# Patient Record
Sex: MALE | Race: WHITE | Employment: OTHER | URBAN - METROPOLITAN AREA
[De-identification: names, ages, dates, MRNs, and addresses within clinical notes are randomized per-mention and may not be internally consistent; named-entity substitution may affect disease eponyms.]

---

## 2017-03-21 ENCOUNTER — HOSPITAL ENCOUNTER (INPATIENT)
Age: 72
LOS: 14 days | Discharge: NURSING, REHAB OR LONG-TERM CARE | DRG: 271 | End: 2017-04-04
Attending: EMERGENCY MEDICINE | Admitting: FAMILY MEDICINE
Payer: MEDICARE

## 2017-03-21 DIAGNOSIS — M54.9 ACUTE BILATERAL BACK PAIN, UNSPECIFIED BACK LOCATION: ICD-10-CM

## 2017-03-21 DIAGNOSIS — R53.81 PHYSICAL DEBILITY: ICD-10-CM

## 2017-03-21 DIAGNOSIS — I82.4Y3 DVT, LOWER EXTREMITY, PROXIMAL, ACUTE, BILATERAL (HCC): Primary | ICD-10-CM

## 2017-03-21 DIAGNOSIS — T14.8XXA HEMATOMA: ICD-10-CM

## 2017-03-21 DIAGNOSIS — F33.9 EPISODE OF RECURRENT MAJOR DEPRESSIVE DISORDER, UNSPECIFIED DEPRESSION EPISODE SEVERITY (HCC): ICD-10-CM

## 2017-03-21 PROBLEM — I82.4Y9 DVT, LOWER EXTREMITY, PROXIMAL, ACUTE (HCC): Status: ACTIVE | Noted: 2017-03-21

## 2017-03-21 LAB
ALBUMIN SERPL BCP-MCNC: 2.6 G/DL (ref 3.2–4.6)
ALBUMIN/GLOB SERPL: 0.7 {RATIO} (ref 1.2–3.5)
ALP SERPL-CCNC: 103 U/L (ref 50–136)
ALT SERPL-CCNC: 26 U/L (ref 12–65)
ANION GAP BLD CALC-SCNC: 7 MMOL/L (ref 7–16)
AST SERPL W P-5'-P-CCNC: 45 U/L (ref 15–37)
BILIRUB SERPL-MCNC: 0.4 MG/DL (ref 0.2–1.1)
BUN SERPL-MCNC: 12 MG/DL (ref 8–23)
CALCIUM SERPL-MCNC: 8.6 MG/DL (ref 8.3–10.4)
CHLORIDE SERPL-SCNC: 110 MMOL/L (ref 98–107)
CO2 SERPL-SCNC: 28 MMOL/L (ref 21–32)
CREAT SERPL-MCNC: 1.33 MG/DL (ref 0.8–1.5)
GLOBULIN SER CALC-MCNC: 3.9 G/DL (ref 2.3–3.5)
GLUCOSE SERPL-MCNC: 109 MG/DL (ref 65–100)
POTASSIUM SERPL-SCNC: 4.5 MMOL/L (ref 3.5–5.1)
PROT SERPL-MCNC: 6.5 G/DL (ref 6.3–8.2)
SODIUM SERPL-SCNC: 145 MMOL/L (ref 136–145)

## 2017-03-21 PROCEDURE — 99284 EMERGENCY DEPT VISIT MOD MDM: CPT | Performed by: EMERGENCY MEDICINE

## 2017-03-21 PROCEDURE — 74011250636 HC RX REV CODE- 250/636: Performed by: FAMILY MEDICINE

## 2017-03-21 PROCEDURE — 80053 COMPREHEN METABOLIC PANEL: CPT | Performed by: EMERGENCY MEDICINE

## 2017-03-21 PROCEDURE — 65270000029 HC RM PRIVATE

## 2017-03-21 PROCEDURE — 77030027138 HC INCENT SPIROMETER -A

## 2017-03-21 RX ORDER — HEPARIN SODIUM 5000 [USP'U]/100ML
18-36 INJECTION, SOLUTION INTRAVENOUS
Status: DISCONTINUED | OUTPATIENT
Start: 2017-03-21 | End: 2017-03-22 | Stop reason: SDUPTHER

## 2017-03-21 RX ORDER — SODIUM CHLORIDE 0.9 % (FLUSH) 0.9 %
5-10 SYRINGE (ML) INJECTION EVERY 8 HOURS
Status: DISCONTINUED | OUTPATIENT
Start: 2017-03-22 | End: 2017-04-04 | Stop reason: HOSPADM

## 2017-03-21 RX ORDER — SODIUM CHLORIDE 0.9 % (FLUSH) 0.9 %
5-10 SYRINGE (ML) INJECTION AS NEEDED
Status: DISCONTINUED | OUTPATIENT
Start: 2017-03-21 | End: 2017-04-04 | Stop reason: HOSPADM

## 2017-03-21 RX ORDER — SAME BUTANEDISULFONATE/BETAINE 400-600 MG
250 POWDER IN PACKET (EA) ORAL 2 TIMES DAILY
Status: ON HOLD | COMMUNITY
End: 2017-05-02

## 2017-03-21 RX ORDER — ENOXAPARIN SODIUM 150 MG/ML
1 INJECTION SUBCUTANEOUS EVERY 12 HOURS
Status: DISCONTINUED | OUTPATIENT
Start: 2017-03-21 | End: 2017-03-21

## 2017-03-21 RX ORDER — HYDROCODONE BITARTRATE AND ACETAMINOPHEN 5; 325 MG/1; MG/1
2 TABLET ORAL
Status: ON HOLD | COMMUNITY
End: 2017-04-04

## 2017-03-21 RX ORDER — HEPARIN SODIUM 5000 [USP'U]/ML
60 INJECTION, SOLUTION INTRAVENOUS; SUBCUTANEOUS ONCE
Status: COMPLETED | OUTPATIENT
Start: 2017-03-21 | End: 2017-03-21

## 2017-03-21 RX ORDER — FAMOTIDINE 20 MG/1
20 TABLET, FILM COATED ORAL DAILY
COMMUNITY
End: 2017-05-22

## 2017-03-21 RX ORDER — POLYETHYLENE GLYCOL 3350 17 G/17G
17 POWDER, FOR SOLUTION ORAL DAILY
Status: ON HOLD | COMMUNITY
End: 2017-05-02

## 2017-03-21 RX ORDER — CYCLOBENZAPRINE HCL 5 MG
5 TABLET ORAL
Status: ON HOLD | COMMUNITY
End: 2017-05-02

## 2017-03-21 RX ORDER — HEPARIN SODIUM 5000 [USP'U]/100ML
18-36 INJECTION, SOLUTION INTRAVENOUS
Status: DISCONTINUED | OUTPATIENT
Start: 2017-03-21 | End: 2017-03-21 | Stop reason: DRUGHIGH

## 2017-03-21 RX ORDER — INSULIN LISPRO 100 [IU]/ML
INJECTION, SOLUTION INTRAVENOUS; SUBCUTANEOUS
Status: DISCONTINUED | OUTPATIENT
Start: 2017-03-22 | End: 2017-04-04 | Stop reason: HOSPADM

## 2017-03-21 RX ORDER — TAMSULOSIN HYDROCHLORIDE 0.4 MG/1
0.4 CAPSULE ORAL DAILY
Status: ON HOLD | COMMUNITY
End: 2017-05-02

## 2017-03-21 RX ADMIN — HEPARIN SODIUM 7250 UNITS: 5000 INJECTION, SOLUTION INTRAVENOUS; SUBCUTANEOUS at 23:33

## 2017-03-21 RX ADMIN — HEPARIN SODIUM AND DEXTROSE 18 UNITS/KG/HR: 5000; 5 INJECTION INTRAVENOUS at 23:45

## 2017-03-21 RX ADMIN — Medication 10 ML: at 23:36

## 2017-03-21 NOTE — ED NOTES
\"A month a go I was climbing 2 steps to take down some curtains and I fell and I hit my head. I went to [address] but while there they found out my kidneys were failing. My heart went crazy too. I was there a couple of weeks. They sent me 2 rehab . I noticed my legs were swelling and my back back hurt worse. They said I had 3 hair line fractures in my back. I went to the doctor this afternoon and they saw all the swelling and sent me to get an ultrasound and they found 2 big blood clots and other blood clots in my legs.  Then they sent us here\"

## 2017-03-22 ENCOUNTER — APPOINTMENT (OUTPATIENT)
Dept: INTERVENTIONAL RADIOLOGY/VASCULAR | Age: 72
DRG: 271 | End: 2017-03-22
Payer: MEDICARE

## 2017-03-22 LAB
APTT PPP: 26.2 SEC (ref 23.5–31.7)
APTT PPP: 51.6 SEC (ref 23.5–31.7)
BACTERIA SPEC CULT: NORMAL
ERYTHROCYTE [DISTWIDTH] IN BLOOD BY AUTOMATED COUNT: 17.5 % (ref 11.9–14.6)
FIBRINOGEN PPP-MCNC: 121 MG/DL (ref 172–437)
GLUCOSE BLD STRIP.AUTO-MCNC: 102 MG/DL (ref 65–100)
GLUCOSE BLD STRIP.AUTO-MCNC: 113 MG/DL (ref 65–100)
GLUCOSE BLD STRIP.AUTO-MCNC: 84 MG/DL (ref 65–100)
GLUCOSE BLD STRIP.AUTO-MCNC: 91 MG/DL (ref 65–100)
GLUCOSE BLD STRIP.AUTO-MCNC: 93 MG/DL (ref 65–100)
HCT VFR BLD AUTO: 32.7 % (ref 41.1–50.3)
HGB BLD-MCNC: 10.4 G/DL (ref 13.6–17.2)
INR PPP: 1.2 (ref 0.9–1.2)
MCH RBC QN AUTO: 30 PG (ref 26.1–32.9)
MCHC RBC AUTO-ENTMCNC: 31.8 G/DL (ref 31.4–35)
MCV RBC AUTO: 94.2 FL (ref 79.6–97.8)
PLATELET # BLD AUTO: 165 K/UL (ref 150–450)
PMV BLD AUTO: 9.2 FL (ref 10.8–14.1)
PROTHROMBIN TIME: 13.2 SEC (ref 9.6–12)
RBC # BLD AUTO: 3.47 M/UL (ref 4.23–5.67)
SERVICE CMNT-IMP: NORMAL
WBC # BLD AUTO: 6.7 K/UL (ref 4.3–11.1)

## 2017-03-22 PROCEDURE — 36556 INSERT NON-TUNNEL CV CATH: CPT

## 2017-03-22 PROCEDURE — 74011000250 HC RX REV CODE- 250: Performed by: RADIOLOGY

## 2017-03-22 PROCEDURE — 85384 FIBRINOGEN ACTIVITY: CPT | Performed by: RADIOLOGY

## 2017-03-22 PROCEDURE — C1894 INTRO/SHEATH, NON-LASER: HCPCS

## 2017-03-22 PROCEDURE — 65610000001 HC ROOM ICU GENERAL

## 2017-03-22 PROCEDURE — 74011250636 HC RX REV CODE- 250/636: Performed by: RADIOLOGY

## 2017-03-22 PROCEDURE — 77030019605

## 2017-03-22 PROCEDURE — 0T9B70Z DRAINAGE OF BLADDER WITH DRAINAGE DEVICE, VIA NATURAL OR ARTIFICIAL OPENING: ICD-10-PCS | Performed by: RADIOLOGY

## 2017-03-22 PROCEDURE — 3E03317 INTRODUCTION OF OTHER THROMBOLYTIC INTO PERIPHERAL VEIN, PERCUTANEOUS APPROACH: ICD-10-PCS | Performed by: RADIOLOGY

## 2017-03-22 PROCEDURE — C1751 CATH, INF, PER/CENT/MIDLINE: HCPCS

## 2017-03-22 PROCEDURE — 85027 COMPLETE CBC AUTOMATED: CPT | Performed by: RADIOLOGY

## 2017-03-22 PROCEDURE — 74011250636 HC RX REV CODE- 250/636: Performed by: FAMILY MEDICINE

## 2017-03-22 PROCEDURE — 99153 MOD SED SAME PHYS/QHP EA: CPT

## 2017-03-22 PROCEDURE — 77030002996 HC SUT SLK J&J -A

## 2017-03-22 PROCEDURE — 75825 VEIN X-RAY TRUNK: CPT

## 2017-03-22 PROCEDURE — 85610 PROTHROMBIN TIME: CPT | Performed by: PHYSICIAN ASSISTANT

## 2017-03-22 PROCEDURE — 77030021532 HC CATH ANGI DX IMPRS MRTM -B

## 2017-03-22 PROCEDURE — 87641 MR-STAPH DNA AMP PROBE: CPT | Performed by: INTERNAL MEDICINE

## 2017-03-22 PROCEDURE — 87641 MR-STAPH DNA AMP PROBE: CPT | Performed by: FAMILY MEDICINE

## 2017-03-22 PROCEDURE — 74011250637 HC RX REV CODE- 250/637: Performed by: RADIOLOGY

## 2017-03-22 PROCEDURE — C1769 GUIDE WIRE: HCPCS

## 2017-03-22 PROCEDURE — B548ZZA ULTRASONOGRAPHY OF SUPERIOR VENA CAVA, GUIDANCE: ICD-10-PCS | Performed by: RADIOLOGY

## 2017-03-22 PROCEDURE — 82962 GLUCOSE BLOOD TEST: CPT

## 2017-03-22 PROCEDURE — 74011250637 HC RX REV CODE- 250/637: Performed by: INTERNAL MEDICINE

## 2017-03-22 PROCEDURE — 85730 THROMBOPLASTIN TIME PARTIAL: CPT | Performed by: PHYSICIAN ASSISTANT

## 2017-03-22 PROCEDURE — B54DZZA ULTRASONOGRAPHY OF BILATERAL LOWER EXTREMITY VEINS, GUIDANCE: ICD-10-PCS | Performed by: RADIOLOGY

## 2017-03-22 PROCEDURE — 37212 THROMBOLYTIC VENOUS THERAPY: CPT

## 2017-03-22 PROCEDURE — C1887 CATHETER, GUIDING: HCPCS

## 2017-03-22 PROCEDURE — 77030034849

## 2017-03-22 PROCEDURE — 06HY33Z INSERTION OF INFUSION DEVICE INTO LOWER VEIN, PERCUTANEOUS APPROACH: ICD-10-PCS | Performed by: RADIOLOGY

## 2017-03-22 PROCEDURE — 85730 THROMBOPLASTIN TIME PARTIAL: CPT | Performed by: FAMILY MEDICINE

## 2017-03-22 PROCEDURE — 02HV33Z INSERTION OF INFUSION DEVICE INTO SUPERIOR VENA CAVA, PERCUTANEOUS APPROACH: ICD-10-PCS | Performed by: RADIOLOGY

## 2017-03-22 PROCEDURE — 74011250636 HC RX REV CODE- 250/636

## 2017-03-22 PROCEDURE — 99152 MOD SED SAME PHYS/QHP 5/>YRS: CPT

## 2017-03-22 PROCEDURE — 36415 COLL VENOUS BLD VENIPUNCTURE: CPT | Performed by: FAMILY MEDICINE

## 2017-03-22 PROCEDURE — 74011636320 HC RX REV CODE- 636/320: Performed by: RADIOLOGY

## 2017-03-22 RX ORDER — HEPARIN SODIUM 200 [USP'U]/100ML
10 INJECTION, SOLUTION INTRAVENOUS CONTINUOUS
Status: DISCONTINUED | OUTPATIENT
Start: 2017-03-22 | End: 2017-03-25

## 2017-03-22 RX ORDER — HEPARIN SODIUM 200 [USP'U]/100ML
1000 INJECTION, SOLUTION INTRAVENOUS ONCE
Status: COMPLETED | OUTPATIENT
Start: 2017-03-22 | End: 2017-03-22

## 2017-03-22 RX ORDER — MORPHINE SULFATE 2 MG/ML
4 INJECTION, SOLUTION INTRAMUSCULAR; INTRAVENOUS
Status: DISCONTINUED | OUTPATIENT
Start: 2017-03-22 | End: 2017-04-04 | Stop reason: HOSPADM

## 2017-03-22 RX ORDER — ONDANSETRON 2 MG/ML
4 INJECTION INTRAMUSCULAR; INTRAVENOUS
Status: DISCONTINUED | OUTPATIENT
Start: 2017-03-22 | End: 2017-04-04 | Stop reason: HOSPADM

## 2017-03-22 RX ORDER — SODIUM CHLORIDE 9 MG/ML
35 INJECTION, SOLUTION INTRAVENOUS CONTINUOUS
Status: DISCONTINUED | OUTPATIENT
Start: 2017-03-22 | End: 2017-03-25

## 2017-03-22 RX ORDER — HEPARIN SODIUM 5000 [USP'U]/ML
35 INJECTION, SOLUTION INTRAVENOUS; SUBCUTANEOUS ONCE
Status: COMPLETED | OUTPATIENT
Start: 2017-03-22 | End: 2017-03-22

## 2017-03-22 RX ORDER — SODIUM CHLORIDE 9 MG/ML
35 INJECTION, SOLUTION INTRAVENOUS ONCE
Status: COMPLETED | OUTPATIENT
Start: 2017-03-22 | End: 2017-03-24

## 2017-03-22 RX ORDER — LIDOCAINE HYDROCHLORIDE 20 MG/ML
50-200 INJECTION, SOLUTION INFILTRATION; PERINEURAL ONCE
Status: COMPLETED | OUTPATIENT
Start: 2017-03-22 | End: 2017-03-22

## 2017-03-22 RX ORDER — MIDAZOLAM HYDROCHLORIDE 1 MG/ML
.5-2 INJECTION, SOLUTION INTRAMUSCULAR; INTRAVENOUS
Status: DISCONTINUED | OUTPATIENT
Start: 2017-03-22 | End: 2017-03-22

## 2017-03-22 RX ORDER — SODIUM CHLORIDE 9 MG/ML
35 INJECTION, SOLUTION INTRAVENOUS ONCE
Status: DISCONTINUED | OUTPATIENT
Start: 2017-03-22 | End: 2017-03-22

## 2017-03-22 RX ORDER — HEPARIN SODIUM 5000 [USP'U]/100ML
500 INJECTION, SOLUTION INTRAVENOUS CONTINUOUS
Status: DISCONTINUED | OUTPATIENT
Start: 2017-03-22 | End: 2017-03-23

## 2017-03-22 RX ORDER — HEPARIN SODIUM 200 [USP'U]/100ML
10 INJECTION, SOLUTION INTRAVENOUS ONCE
Status: COMPLETED | OUTPATIENT
Start: 2017-03-22 | End: 2017-03-22

## 2017-03-22 RX ORDER — ZOLPIDEM TARTRATE 5 MG/1
5 TABLET ORAL
Status: DISCONTINUED | OUTPATIENT
Start: 2017-03-22 | End: 2017-04-04 | Stop reason: HOSPADM

## 2017-03-22 RX ORDER — HYDRALAZINE HYDROCHLORIDE 20 MG/ML
20 INJECTION INTRAMUSCULAR; INTRAVENOUS
Status: DISCONTINUED | OUTPATIENT
Start: 2017-03-22 | End: 2017-04-04 | Stop reason: HOSPADM

## 2017-03-22 RX ORDER — FENTANYL CITRATE 50 UG/ML
25-100 INJECTION, SOLUTION INTRAMUSCULAR; INTRAVENOUS
Status: DISCONTINUED | OUTPATIENT
Start: 2017-03-22 | End: 2017-03-22

## 2017-03-22 RX ORDER — HYDROCODONE BITARTRATE AND ACETAMINOPHEN 7.5; 325 MG/1; MG/1
1 TABLET ORAL
Status: DISCONTINUED | OUTPATIENT
Start: 2017-03-22 | End: 2017-03-29

## 2017-03-22 RX ORDER — DIPHENHYDRAMINE HYDROCHLORIDE 50 MG/ML
12.5-5 INJECTION, SOLUTION INTRAMUSCULAR; INTRAVENOUS ONCE
Status: DISCONTINUED | OUTPATIENT
Start: 2017-03-22 | End: 2017-03-22

## 2017-03-22 RX ORDER — SODIUM CHLORIDE 9 MG/ML
25 INJECTION, SOLUTION INTRAVENOUS ONCE
Status: COMPLETED | OUTPATIENT
Start: 2017-03-22 | End: 2017-03-24

## 2017-03-22 RX ADMIN — DEXTROSE MONOHYDRATE: 5 INJECTION, SOLUTION INTRAVENOUS at 16:00

## 2017-03-22 RX ADMIN — HEPARIN SODIUM 20 UNITS/HR: 200 INJECTION, SOLUTION INTRAVENOUS at 18:29

## 2017-03-22 RX ADMIN — FENTANYL CITRATE 50 MCG: 50 INJECTION, SOLUTION INTRAMUSCULAR; INTRAVENOUS at 16:46

## 2017-03-22 RX ADMIN — FENTANYL CITRATE 25 MCG: 50 INJECTION, SOLUTION INTRAMUSCULAR; INTRAVENOUS at 17:01

## 2017-03-22 RX ADMIN — IOPAMIDOL 100 ML: 755 INJECTION, SOLUTION INTRAVENOUS at 17:03

## 2017-03-22 RX ADMIN — SODIUM CHLORIDE 35 ML/HR: 900 INJECTION, SOLUTION INTRAVENOUS at 18:05

## 2017-03-22 RX ADMIN — SODIUM CHLORIDE 35 ML/HR: 900 INJECTION, SOLUTION INTRAVENOUS at 18:00

## 2017-03-22 RX ADMIN — Medication 10 ML: at 21:58

## 2017-03-22 RX ADMIN — HEPARIN SODIUM AND DEXTROSE 500 UNITS/HR: 5000; 5 INJECTION INTRAVENOUS at 17:50

## 2017-03-22 RX ADMIN — MIDAZOLAM HYDROCHLORIDE 1 MG: 1 INJECTION, SOLUTION INTRAMUSCULAR; INTRAVENOUS at 17:00

## 2017-03-22 RX ADMIN — MIDAZOLAM HYDROCHLORIDE 1 MG: 1 INJECTION, SOLUTION INTRAMUSCULAR; INTRAVENOUS at 16:45

## 2017-03-22 RX ADMIN — MIDAZOLAM HYDROCHLORIDE 1 MG: 1 INJECTION, SOLUTION INTRAMUSCULAR; INTRAVENOUS at 16:39

## 2017-03-22 RX ADMIN — HEPARIN SODIUM 10 ML/HR: 200 INJECTION, SOLUTION INTRAVENOUS at 18:00

## 2017-03-22 RX ADMIN — MIDAZOLAM HYDROCHLORIDE 1 MG: 1 INJECTION, SOLUTION INTRAMUSCULAR; INTRAVENOUS at 16:37

## 2017-03-22 RX ADMIN — LIDOCAINE HYDROCHLORIDE 200 MG: 20 INJECTION, SOLUTION INFILTRATION; PERINEURAL at 16:44

## 2017-03-22 RX ADMIN — HEPARIN SODIUM 4200 UNITS: 5000 INJECTION, SOLUTION INTRAVENOUS; SUBCUTANEOUS at 08:59

## 2017-03-22 RX ADMIN — ALTEPLASE 1 MG/HR: KIT at 22:34

## 2017-03-22 RX ADMIN — ALTEPLASE 2 MG/HR: KIT at 18:00

## 2017-03-22 RX ADMIN — ALTEPLASE 2 MG/HR: KIT at 18:05

## 2017-03-22 RX ADMIN — SODIUM CHLORIDE 25 ML/HR: 900 INJECTION, SOLUTION INTRAVENOUS at 16:45

## 2017-03-22 RX ADMIN — HYDRALAZINE HYDROCHLORIDE 20 MG: 20 INJECTION INTRAMUSCULAR; INTRAVENOUS at 19:45

## 2017-03-22 RX ADMIN — FENTANYL CITRATE 25 MCG: 50 INJECTION, SOLUTION INTRAMUSCULAR; INTRAVENOUS at 16:37

## 2017-03-22 RX ADMIN — ZOLPIDEM TARTRATE 5 MG: 5 TABLET ORAL at 00:39

## 2017-03-22 RX ADMIN — LIDOCAINE HYDROCHLORIDE 50 MG: 20 INJECTION, SOLUTION INFILTRATION; PERINEURAL at 16:09

## 2017-03-22 RX ADMIN — HYDROCODONE BITARTRATE AND ACETAMINOPHEN 1 TABLET: 7.5; 325 TABLET ORAL at 19:46

## 2017-03-22 RX ADMIN — HEPARIN SODIUM 2000 UNITS: 200 INJECTION, SOLUTION INTRAVENOUS at 16:48

## 2017-03-22 RX ADMIN — HEPARIN SODIUM 2000 UNITS: 200 INJECTION, SOLUTION INTRAVENOUS at 18:00

## 2017-03-22 RX ADMIN — HEPARIN SODIUM AND DEXTROSE 20 UNITS/KG/HR: 5000; 5 INJECTION INTRAVENOUS at 09:03

## 2017-03-22 NOTE — CONSULTS
Department of Interventional Radiology  [phone]        Consult Note     Patient: Doyle Rueda MRN: [id number]  SSN: [id number]    YOB: 1945  Age: 66 y.o. Sex: male      Referring Physician: Hospitalist    Consult Date: 3/22/2017     Subjective:     Chief Complaint: DVT    History of Present Illness: Doyle Rueda is a 66 y.o. male who is seen in consultation for possible DVT thrombolysis. Pt began experiencing bilateral LE swelling 2 weeks ago while in rehab. He presented to his PCP yesterday with same complaints and an US performed at [facility] revealed extensive bilateral LE DVT extending in to the iliacs. He c/o LE heaviness, like carrying around 2 cement blocks. Right leg most symptomatic. He is tearful when speaking about the events over the last month since his fall. He was previously very active. Pt reports that he was in good health up until 1 month ago when he fell and was on the floor for 2 hours until EMS transported him to [facility]. He was found to be dehydrated, in renal failure requiring a couple of hemodialysis sessions. Following that admission he was discharged to rehab. He required readmission for another illness. Hx DVT 5 or 6 years ago-he does not recall which leg. At that time an IVC filter was placed and he states he was not discharged on anticoagulation. No bleeding history and sts he is up to date on cancer screenings.        Past Medical History:   Diagnosis Date    Calculus of kidney     Diabetes ([address]     [facility]deep [facility] thrombosis) [facility] 2013    s/p IVC filter      Past Surgical History:   Procedure Laterality Date    HX UROLOGICAL        Family History   Problem Relation Age of Onset    Cancer Brother      lung cancer    Deep Vein Thrombosis Other      father    Heart Disease Other      mother     Social History   Substance Use Topics    Smoking status: Former Smoker    Smokeless tobacco: Not on file    Alcohol use 0.0 oz/week     0 Standard drinks or equivalent per week      Allergies   Allergen Reactions    Sulfite Hives     Current Facility-Administered Medications   Medication Dose Route Frequency    zolpidem (AMBIEN) tablet 5 mg  5 mg Oral QHS PRN    heparin 25,000 units in dextrose 500 mL infusion  18-36 Units/kg/hr (Adjusted) IntraVENous TITRATE    sodium chloride (NS) flush 5-10 mL  5-10 mL IntraVENous Q8H    sodium chloride (NS) flush 5-10 mL  5-10 mL IntraVENous PRN    insulin lispro (HUMALOG) injection   SubCUTAneous AC&HS        Review of Systems:  A detailed 10 organ review of systems is obtained with pertinent positives as listed in the History of Present Illness and Past Medical History. All others are negative. Objective:     Physical Exam:  Visit Vitals    /70    Pulse 88    Temp 97.6 °F (36.4 °C)    Resp 18    Ht 5' 6\" (1.676 m)    Wt 120.7 kg (266 lb)    SpO2 98%    BMI 42.93 kg/m2      HEART: regular rate and rhythm  LUNG: clear to auscultation bilaterally  ABDOMEN: normal findings: soft, non-tender, obese  EXTREMITIES: warm, 3-4 + bilateral LE edema extending to his groin. pulses intact.   perfused  Lab/Data Review:  CMP:   Lab Results   Component Value Date/Time     03/21/2017 08:31 PM    K 4.5 03/21/2017 08:31 PM     (H) 03/21/2017 08:31 PM    CO2 28 03/21/2017 08:31 PM    AGAP 7 03/21/2017 08:31 PM     (H) 03/21/2017 08:31 PM    BUN 12 03/21/2017 08:31 PM    CREA 1.33 03/21/2017 08:31 PM    GFRAA >60 03/21/2017 08:31 PM    GFRNA 56 (L) 03/21/2017 08:31 PM    CA 8.6 03/21/2017 08:31 PM    ALB 2.6 (L) 03/21/2017 08:31 PM    TP 6.5 03/21/2017 08:31 PM    GLOB 3.9 (H) 03/21/2017 08:31 PM    AGRAT 0.7 (L) 03/21/2017 08:31 PM    SGOT 45 (H) 03/21/2017 08:31 PM    ALT 26 03/21/2017 08:31 PM     CBC: No results found for: WBC, HGB, HGBEXT, HCT, HCTEXT, PLT, PLTEXT, HGBEXT, HCTEXT, PLTEXT  COAGS: No results found for: APTT, PTP, INR      Assessment/Plan:   Extensive bilateral LE DVT, hx LE DVT, IVC filter. Discussed options with the patient including anticoagulation alone and thrombolysis. He defers many questions to his wife, who is not present, but would like us to proceed with what we believe to be appropriate care. He is frustrated with his lack of mobility due to the extensive LE swelling. Considering the extent of his bilateral leg swelling, his cava may be thrombosed. Will discuss with Dr. David Ferguson. NPO. Heparin infusing.     Kvng Steven PA-C    Principal Problem:    DVT, lower extremity, proximal, acute ([address] (3/21/2017)    Active Problems:    Type 2 diabetes mellitus without complication ([address] (1/47/3896)      Benign non-nodular prostatic hyperplasia without lower urinary tract symptoms (7/22/2016)      Hypertriglyceridemia (7/22/2016)      Depression (7/22/2016)      Obstructive sleep apnea syndrome (7/22/2016)      Overview: Home CPAP      Morbid obesity due to excess calories ([address] (7/22/2016)      Primary insomnia (7/22/2016)      Benign essential HTN (9/13/2016)

## 2017-03-22 NOTE — PROGRESS NOTES
TRANSFER - IN REPORT:    Verbal report received from Marcela  on [facility] [employer]  being received from ED(unit) for routine progression of care      Report consisted of patients Situation, Background, Assessment and   Recommendations(SBAR). Information from the following report(s) SBAR, ED Summary, Procedure Summary, Intake/Output, MAR and Recent Results was reviewed with the receiving nurse. Opportunity for questions and clarification was provided. Assessment to be completed upon patients arrival to unit and care assumed.

## 2017-03-22 NOTE — PROGRESS NOTES
Patient consented. All questions answered. EKOS instrument prepared for procedure. Heparin ordered and with patient.

## 2017-03-22 NOTE — PROGRESS NOTES
#16 magana cath inserted per order by Chris Bach RN and approximately 350ml of adolfo urine obtained.

## 2017-03-22 NOTE — ED NOTES
TRANSFER - OUT REPORT:    Verbal report given to [address] [place] [address] on [facility]  being transferred to 2nd floor. Report consisted of patients Situation, Background, Assessment and   Recommendations(SBAR). Information from the following report(s) ED Summary, MAR, SBAR, and vitals was reviewed with the receiving nurse. Lines:       Opportunity for questions and clarification was provided.       Patient transported with:   [employer]

## 2017-03-22 NOTE — PROGRESS NOTES
65 yo male patient admitted to room 218 fr ED. Alert and oriented x 4 dual skin assessment completed with Heidi Vailff, LPN ,Flank, sacral, and bilateral lower extremity edema 3+ pitting. Old tattoos noted. No skin breakdown noted. Denies pain, requesting medication for sleep. States he hasn't emptied bladder all day. Admission database completed. Oriented to room and nurse call system. Placed call to Dr. Yousif Gonzales, notified of request for Ruy Solid and no urinary output, orders received.

## 2017-03-22 NOTE — ED PROVIDER NOTES
HPI Comments: Patient comes in with history of having significant dependent edema. With this he was seen by his primary care provider and from there referred for an ultrasound of his lower extremities. He reports that he has is that he has extensive clot to both lower extremities. The past his head DVT and has a Everetts filter. Denies any chest pain or shortness of breath. No Present Blood Thinners. States studies were done at [facility]. I got a verbal report from his internal medicine doctor - there is an attachment that is provided in radiology report with more complete documentation    Patient is a 66 y.o. male presenting with abnormal lab results. The history is provided by the patient. Abnormal Lab Results   This is a new problem. Pertinent negatives include no chest pain and no abdominal pain. Nothing aggravates the symptoms. He has tried nothing for the symptoms. Past Medical History:   Diagnosis Date    Calculus of kidney     Diabetes ([address]     DVT (deep venous thrombosis) ([facility] [address] 2013    s/p IVC filter        Past Surgical History:   Procedure Laterality Date    HX UROLOGICAL           Family History:   Problem Relation Age of Onset    Cancer Brother      lung cancer    Deep Vein Thrombosis Other      father    Heart Disease Other      mother       Social History     Social History    Marital status:      Spouse name: N/A    Number of children: N/A    Years of education: N/A     Occupational History    Not on file. Social History Main Topics    Smoking status: Former Smoker    Smokeless tobacco: Not on file    Alcohol use 0.0 oz/week     0 Standard drinks or equivalent per week    Drug use: No    Sexual activity: Not on file     Other Topics Concern    Not on file     Social History Narrative         ALLERGIES: Sulfite    Review of Systems   Constitutional: Negative for chills and fever. Respiratory: Negative.     Cardiovascular: Positive for leg swelling. Negative for chest pain. Gastrointestinal: Negative for abdominal pain. Genitourinary: Negative. Neurological: Negative. All other systems reviewed and are negative. Vitals:    03/21/17 1920 03/21/17 1921 03/21/17 2056 03/21/17 2058   BP: 151/67  141/63    Pulse:    87   Resp:       Temp:       SpO2:  99%  98%   Weight:       Height:                Physical Exam   Constitutional: He appears well-developed and well-nourished. No distress. HENT:   Head: Atraumatic. Eyes: No scleral icterus. Neck: Neck supple. Cardiovascular: Normal rate, regular rhythm and intact distal pulses. Pulmonary/Chest: Effort normal. No respiratory distress. He has no wheezes. He exhibits no tenderness. Abdominal: Soft. There is no tenderness. There is no rebound. Musculoskeletal: He exhibits edema. significant pitting edema bilaterally   Neurological: He is alert. Coordination normal.   Skin: Skin is warm and dry. No rash noted. No erythema. Psychiatric: His behavior is normal. Thought content normal.   Nursing note and vitals reviewed.        MDM  Number of Diagnoses or Management Options  Diagnosis management comments: Bilateral extensive deep venous thrombosis to the level of the iliacs bilaterally per report from Dr. Dipika Duarte and/or Complexity of Data Reviewed  Clinical lab tests: ordered and reviewed  Tests in the radiology section of CPT®: reviewed  Decide to obtain previous medical records or to obtain history from someone other than the patient: yes  Obtain history from someone other than the patient: yes    Risk of Complications, Morbidity, and/or Mortality  Presenting problems: high  Diagnostic procedures: low  Management options: moderate    Patient Progress  Patient progress: stable    ED Course       Procedures

## 2017-03-22 NOTE — H&P
HOSPITALIST H&P  NAME:  Fatoumata Savage   Age:  66 y.o.  :   1945   MRN:   [id number]  PCP: Viviana Antonio MD  Treatment Team: Attending Provider: Marvin Hackett MD; Primary Nurse: Odell Mckeon RN    HPI:   Fatoumata Savage is a 66 y.o. male that presented to the ED with 2 week history of worsening swelling of the bilateral lower extremities with increasing difficulty with ambulation. He had OP US today showing extensive DVT. He is currently undergoing rehab at [facility] following admission at [facility] for JIM requiring 2 runs of HD. He has history of DVT in 2013 and had IVC filter placed at that time. Patient denies dyspnea or chest pain. The hospitalist have been asked to admit. Results summary of Diagnostic Studies/Procedures copied from within [facility] EMR:   [address] performed today at [facility] shows diffuse DVT of both legs extending into iliac vessels. Complete ROS done and is as stated in HPI or otherwise negative  Past Medical History:   Diagnosis Date    Calculus of kidney     Diabetes ([address]     DVT (deep venous thrombosis) ([facility] [address]     s/p IVC filter       Past Surgical History:   Procedure Laterality Date    HX UROLOGICAL        Prior to Admission Medications   Prescriptions Last Dose Informant Patient Reported? Taking? Lancets (ACCU-CHEK FASTCLIX) misc   No No   Sig: Check BS Once Daily  DX E11.9   colestipol (COLESTID) 1 gram tablet   Yes No   Sig: TAKE 2 TABLETS (2 G) BY MOUTH 2 (TWO) TIMES A DAY. fenofibrate nanocrystallized (TRICOR) 145 mg tablet   No No   Sig: Take 1 Tab by mouth daily. glucose blood VI test strips (ACCU-CHEK [place] PLUS TEST STRP) strip   No No   Sig: Check BS Once Daily  Dx E11.9   metFORMIN (GLUCOPHAGE) 500 mg tablet   No No   Sig: Take 1 Tab by mouth two (2) times daily (with meals).    nystatin-triamcinolone (MYCOLOG II) topical cream   Yes No   Sig: APPLY TO RASH AND GROIN TWICE DAILY AS DIRECTED   olmesartan (BENICAR) 40 mg tablet   No No Sig: Take 1 Tab by mouth daily. potassium citrate (UROCIT-K10) 10 mEq (1,080 mg) TbER   No No   Sig: Take 1 Tab by mouth two (2) times a day. tiZANidine (ZANAFLEX) 2 mg tablet   No No   Sig: Take 1 Tab by mouth two (2) times a day. zolpidem (AMBIEN) 5 mg tablet   No No   Sig: Take 1 Tab by mouth nightly as needed for Sleep. Max Daily Amount: 5 mg. Facility-Administered Medications: None     Allergies   Allergen Reactions    Sulfite Hives      Social History   Substance Use Topics    Smoking status: Former Smoker    Smokeless tobacco: Not on file    Alcohol use 0.0 oz/week     0 Standard drinks or equivalent per week      Family History   Problem Relation Age of Onset    Cancer Brother      lung cancer    Deep Vein Thrombosis Other      father    Heart Disease Other      mother          Objective:     Visit Vitals    /63    Pulse 87    Temp 97.6 °F (36.4 °C)    Resp 18    Ht 5' 6\" (1.676 m)    Wt 120.7 kg (266 lb)    SpO2 98%    BMI 42.93 kg/m2      Temp (24hrs), Av.6 °F (36.4 °C), Min:97.6 °F (36.4 °C), Max:97.6 °F (36.4 °C)    Oxygen Therapy  O2 Sat (%): 98 % (17)  Pulse via Oximetry: 87 beats per minute (17)  O2 Device: Room air (17 0874)  Physical Exam:  General:    Alert, cooperative, no distress, appears stated age. Head:   Normocephalic, without obvious abnormality, atraumatic. Nose:  Nares normal. No drainage or sinus tenderness. Lungs:   CTA  Heart:  RRR  Abdomen:   Soft, non-tender. Not distended. Bowel sounds normal. No masses  Extremities: No cyanosis. No clubbing. BLE 3+ edema   Skin:     Texture, turgor normal. No rashes or lesions.   Not Jaundiced  Neurologic: Alert and oriented times 4, non-focal   Data Review:   Recent Results (from the past 24 hour(s))   AMB POC COMPLETE CBC,AUTOMATED ENTER    Collection Time: 17  1:36 PM   Result Value Ref Range    WBC (POC) 5.2 4.5 - 10.5 10^3/ul    LYMPHOCYTES (POC) 22.0 20.5 - 51.1 % MONOCYTES (POC) 7.1 1.7 - 9.3 %    GRANULOCYTES (POC) 70.9 42.2 - 75.2 %    ABS. LYMPHS (POC) 1.1 (A) 1.2 - 3.4 10^3/ul    ABS. MONOS (POC) 0.4 0.1 - 0.6 10^3/ul    ABS. GRANS (POC) 3.7 1.4 - 6.5 10^3/ul    RBC (POC) 3.97 (A) 4 - 6 10^6/ul    HGB (POC) 11.5 11 - 18 g/dL    HCT (POC) 37.9 35 - 60 %    MCV (POC) 95.4 80 - 99.9 fL    MCH (POC) 29.0 27 - 31 pg    MCHC (POC) 30.4 (A) 33 - 37 g/dL    RDW (POC) 18.6 (A) 11.6 - 13.7 %    PLATELET (POC) 586 827 - 450 10^3/ul    MPV (POC) 6.8 (A) 7.8 - 11 fL   METABOLIC PANEL, COMPREHENSIVE    Collection Time: 03/21/17  8:31 PM   Result Value Ref Range    Sodium 145 136 - 145 mmol/L    Potassium 4.5 3.5 - 5.1 mmol/L    Chloride 110 (H) 98 - 107 mmol/L    CO2 28 21 - 32 mmol/L    Anion gap 7 7 - 16 mmol/L    Glucose 109 (H) 65 - 100 mg/dL    BUN 12 8 - 23 MG/DL    Creatinine 1.33 0.8 - 1.5 MG/DL    GFR est AA >60 >60 ml/min/1.73m2    GFR est non-AA 56 (L) >60 ml/min/1.73m2    Calcium 8.6 8.3 - 10.4 MG/DL    Bilirubin, total 0.4 0.2 - 1.1 MG/DL    ALT (SGPT) 26 12 - 65 U/L    AST (SGOT) 45 (H) 15 - 37 U/L    Alk. phosphatase 103 50 - 136 U/L    Protein, total 6.5 6.3 - 8.2 g/dL    Albumin 2.6 (L) 3.2 - 4.6 g/dL    Globulin 3.9 (H) 2.3 - 3.5 g/dL    A-G Ratio 0.7 (L) 1.2 - 3.5         Assessment and Plan:      Active Hospital Problems    Diagnosis Date Noted    DVT, lower extremity, proximal, acute ([facility] [address] 03/21/2017    Benign essential HTN 09/13/2016    Type 2 diabetes mellitus without complication [facility] 07/13/1456    Morbid obesity due to excess calories ([facility] 07/22/2016    Hypertriglyceridemia 07/22/2016    Depression 07/22/2016    Benign non-nodular prostatic hyperplasia without lower urinary tract symptoms 07/22/2016    Obstructive sleep apnea syndrome 07/22/2016     Home CPAP      Primary insomnia 07/22/2016   Admit to medical bed   Heparin infusion  Consult IR  Spouse to bring in home CPAP machine   FULL CODE  Surrogate decision maker: spouse, Abbi Lombardile   Estimated length of stay:>2 midnights   Marleni Lee, PA

## 2017-03-22 NOTE — PROGRESS NOTES
Progress Note    Patient: Amandeep Up MRN: [id number]  SSN: [id number]    YOB: 1945  Age: 66 y.o. Sex: male      Admit Date: 3/21/2017    LOS: 1 day     Subjective:     65 yo male admitted for extensive LE DVT found via outpatient US. Pt had extended hospitalization this past month and was having leg pain during rehab. Pt states he's depressed about all the medical complications he's had this past month. He denies ezekiel pain, dyspnea, N/V, or abd pain. Review of Systems:  Pertinent per HPI. Medications:  Current Facility-Administered Medications   Medication Dose Route Frequency    zolpidem (AMBIEN) tablet 5 mg  5 mg Oral QHS PRN    heparin 25,000 units in dextrose 500 mL infusion  18-36 Units/kg/hr (Adjusted) IntraVENous TITRATE    sodium chloride (NS) flush 5-10 mL  5-10 mL IntraVENous Q8H    sodium chloride (NS) flush 5-10 mL  5-10 mL IntraVENous PRN    insulin lispro (HUMALOG) injection   SubCUTAneous AC&HS       Objective:     Vitals:    03/21/17 2305 03/22/17 0300 03/22/17 0727 03/22/17 1110   BP: 133/56 123/63 136/64 146/70   Pulse: 87 87 77 88   Resp: 19 18 18 18   Temp: 98.1 °F (36.7 °C) 97.4 °F (36.3 °C) 97.4 °F (36.3 °C) 97.6 °F (36.4 °C)   SpO2: 97% 98% 100% 98%   Weight:       Height:            Physical Exam:   General: awake, alert, no apparent distress  Lungs: Clear to auscultation bilaterally. Heart: Regular rate and rhythm. Abdomen: Soft, nontender, nondistended. Bowel sounds normal.  Extremities:  Bilateral 3+ LE edema. Skin: Warm/dry. Psych: AOx3. Normal mood and affect. Lab/Data Review:  Recent Results (from the past 24 hour(s))   AMB POC COMPLETE CBC,AUTOMATED ENTER    Collection Time: 03/21/17  1:36 PM   Result Value Ref Range    WBC (POC) 5.2 4.5 - 10.5 10^3/ul    LYMPHOCYTES (POC) 22.0 20.5 - 51.1 %    MONOCYTES (POC) 7.1 1.7 - 9.3 %    GRANULOCYTES (POC) 70.9 42.2 - 75.2 %    ABS. LYMPHS (POC) 1.1 (A) 1.2 - 3.4 10^3/ul    ABS.  MONOS (POC) 0.4 0.1 - 0.6 10^3/ul    ABS. GRANS (POC) 3.7 1.4 - 6.5 10^3/ul    RBC (POC) 3.97 (A) 4 - 6 10^6/ul    HGB (POC) 11.5 11 - 18 g/dL    HCT (POC) 37.9 35 - 60 %    MCV (POC) 95.4 80 - 99.9 fL    MCH (POC) 29.0 27 - 31 pg    MCHC (POC) 30.4 (A) 33 - 37 g/dL    RDW (POC) 18.6 (A) 11.6 - 13.7 %    PLATELET (POC) 327 799 - 450 10^3/ul    MPV (POC) 6.8 (A) 7.8 - 11 fL   METABOLIC PANEL, COMPREHENSIVE    Collection Time: 03/21/17  8:31 PM   Result Value Ref Range    Sodium 145 136 - 145 mmol/L    Potassium 4.5 3.5 - 5.1 mmol/L    Chloride 110 (H) 98 - 107 mmol/L    CO2 28 21 - 32 mmol/L    Anion gap 7 7 - 16 mmol/L    Glucose 109 (H) 65 - 100 mg/dL    BUN 12 8 - 23 MG/DL    Creatinine 1.33 0.8 - 1.5 MG/DL    GFR est AA >60 >60 ml/min/1.73m2    GFR est non-AA 56 (L) >60 ml/min/1.73m2    Calcium 8.6 8.3 - 10.4 MG/DL    Bilirubin, total 0.4 0.2 - 1.1 MG/DL    ALT (SGPT) 26 12 - 65 U/L    AST (SGOT) 45 (H) 15 - 37 U/L    Alk. phosphatase 103 50 - 136 U/L    Protein, total 6.5 6.3 - 8.2 g/dL    Albumin 2.6 (L) 3.2 - 4.6 g/dL    Globulin 3.9 (H) 2.3 - 3.5 g/dL    A-G Ratio 0.7 (L) 1.2 - 3.5     MRSA SCREEN - PCR (NASAL)    Collection Time: 03/22/17  1:00 AM   Result Value Ref Range    Special Requests: NO SPECIAL REQUESTS      Culture result:        MRSA target DNA is not detected (presumptive not colonized with MRSA)   GLUCOSE, POC    Collection Time: 03/22/17  6:00 AM   Result Value Ref Range    Glucose (POC) 113 (H) 65 - 100 mg/dL   PTT, CRRT PROTOCOL (PTT DRIP)    Collection Time: 03/22/17  6:09 AM   Result Value Ref Range    PTT, CRRT PROTOCOL 51.6 (H) 23.5 - 31.7 SEC   GLUCOSE, POC    Collection Time: 03/22/17 11:17 AM   Result Value Ref Range    Glucose (POC) 102 (H) 65 - 100 mg/dL     I have reviewed new clinical data.     Assessment:     Principal Problem:    DVT, lower extremity, proximal, acute ([facility] (3/21/2017)    Active Problems:    Type 2 diabetes mellitus without complication ([facility] [facility] (7/03/1449)      Benign non-nodular prostatic hyperplasia without lower urinary tract symptoms (7/22/2016)      Hypertriglyceridemia (7/22/2016)      Depression (7/22/2016)      Obstructive sleep apnea syndrome (7/22/2016)      Overview: Home CPAP      Morbid obesity due to excess calories ([facility] (7/22/2016)      Primary insomnia (7/22/2016)      Benign essential HTN (9/13/2016)      Plan:     Heparin infusion  IR consulted  Will discuss oral anticoagulants with patient once I know whether IR will perform intervention.   Consult PT/OT    DVT prophylaxis: heparin  Disposition: [facility] vs rehab    Signed By: Velia Harrell DO     March 22, 2017

## 2017-03-22 NOTE — PROCEDURES
Interventional Radiology Brief Procedure Note    Patient: Marge Alberts MRN: [id number]  SSN: [id number]    YOB: 1945  Age: 66 y.o. Sex: male      Date of Procedure: 3/22/2017    Pre-Procedure Diagnosis: Deep venous thrombosis bilateral lower extremities. Post-Procedure Diagnosis: SAME    Procedure(s): temporary non-tunneled right internal jugular central venous catheter, for infusion of fluids, and blood tests. Bilateral lower extremity, transpopliteal venography and initiation of transcather EKOS lysis infusion. Brief Description of Procedure: US bilateral popliteal veins. Placed bilateral infusion catheters for EKOS at 2.0 mg/hour each, for 4 hours, to then be decreased to 1.0 mg/hr. Performed By: Masood Oshea MD     Assistants: None    Anesthesia: Moderate Sedation    Estimated Blood Loss: 20 ml    Specimens: None    Implants: bilateral EKOS catheters from pop veins, right IJV catheter. Findings: Massive bilateral iliocaval thrombosis,including IVC slightly above an Optease inferior vena caval filter. Sub acute to chronic. (possible 11years old). Complications: None    Recommendations: overnight monitoring in unit with infusion of tPA and EKOS; close monitoring of vital signs, and laboratory parameters, including hemoglobin, fibrinogen. Bed rest with magana catheter. NPO after 21 MN , may advance to reg diet. Follow Up: tomorrow. Will probably require additional intervention including angioplasty, stent and possible Possis Thrombectomy.      Signed By: Masood Oshea MD     March 22, 2017

## 2017-03-22 NOTE — PROGRESS NOTES
Bladder scan reveals greater than 400. Explained that I & O catheter ordered. States does not want to be cathed and can urinate. Allowed time to urinate. Voided 350 ml adolfo urine. voiced relief.

## 2017-03-22 NOTE — PROGRESS NOTES
PT note: Orders received for therapy evaluation. Chart reviewed. Pt admitted last PM with extensive DVT and was started on Heparin around 2300 per MAR. Recommendations are to hold therapy for 24 hours after anticoagulation initiated prior to therapy evaluation/mobility. Will check back tomorrow for assessment. Thank you.     QI FelipeT

## 2017-03-23 ENCOUNTER — APPOINTMENT (OUTPATIENT)
Dept: INTERVENTIONAL RADIOLOGY/VASCULAR | Age: 72
DRG: 271 | End: 2017-03-23
Attending: RADIOLOGY
Payer: MEDICARE

## 2017-03-23 LAB
ANION GAP BLD CALC-SCNC: 8 MMOL/L (ref 7–16)
APTT PPP: 49.2 SEC (ref 23.5–31.7)
BACTERIA SPEC CULT: NORMAL
BUN SERPL-MCNC: 12 MG/DL (ref 8–23)
CALCIUM SERPL-MCNC: 7.9 MG/DL (ref 8.3–10.4)
CHLORIDE SERPL-SCNC: 108 MMOL/L (ref 98–107)
CO2 SERPL-SCNC: 28 MMOL/L (ref 21–32)
CREAT SERPL-MCNC: 1.05 MG/DL (ref 0.8–1.5)
ERYTHROCYTE [DISTWIDTH] IN BLOOD BY AUTOMATED COUNT: 17.6 % (ref 11.9–14.6)
ERYTHROCYTE [DISTWIDTH] IN BLOOD BY AUTOMATED COUNT: 17.7 % (ref 11.9–14.6)
ERYTHROCYTE [DISTWIDTH] IN BLOOD BY AUTOMATED COUNT: 17.9 % (ref 11.9–14.6)
FIBRINOGEN PPP-MCNC: 25 MG/DL (ref 172–437)
FIBRINOGEN PPP-MCNC: 34 MG/DL (ref 172–437)
GLUCOSE BLD STRIP.AUTO-MCNC: 123 MG/DL (ref 65–100)
GLUCOSE BLD STRIP.AUTO-MCNC: 93 MG/DL (ref 65–100)
GLUCOSE BLD STRIP.AUTO-MCNC: 94 MG/DL (ref 65–100)
GLUCOSE BLD STRIP.AUTO-MCNC: 95 MG/DL (ref 65–100)
GLUCOSE SERPL-MCNC: 93 MG/DL (ref 65–100)
HCT VFR BLD AUTO: 29.1 % (ref 41.1–50.3)
HCT VFR BLD AUTO: 30.5 % (ref 41.1–50.3)
HCT VFR BLD AUTO: 30.9 % (ref 41.1–50.3)
HGB BLD-MCNC: 9.4 G/DL (ref 13.6–17.2)
HGB BLD-MCNC: 9.6 G/DL (ref 13.6–17.2)
HGB BLD-MCNC: 9.7 G/DL (ref 13.6–17.2)
INR PPP: 1.8 (ref 0.9–1.2)
MCH RBC QN AUTO: 29.6 PG (ref 26.1–32.9)
MCH RBC QN AUTO: 29.7 PG (ref 26.1–32.9)
MCH RBC QN AUTO: 30.4 PG (ref 26.1–32.9)
MCHC RBC AUTO-ENTMCNC: 31.1 G/DL (ref 31.4–35)
MCHC RBC AUTO-ENTMCNC: 31.8 G/DL (ref 31.4–35)
MCHC RBC AUTO-ENTMCNC: 32.3 G/DL (ref 31.4–35)
MCV RBC AUTO: 93.6 FL (ref 79.6–97.8)
MCV RBC AUTO: 94.2 FL (ref 79.6–97.8)
MCV RBC AUTO: 95.4 FL (ref 79.6–97.8)
PLATELET # BLD AUTO: 135 K/UL (ref 150–450)
PLATELET # BLD AUTO: 140 K/UL (ref 150–450)
PLATELET # BLD AUTO: 156 K/UL (ref 150–450)
PMV BLD AUTO: 8.9 FL (ref 10.8–14.1)
PMV BLD AUTO: 9 FL (ref 10.8–14.1)
PMV BLD AUTO: 9.2 FL (ref 10.8–14.1)
POTASSIUM SERPL-SCNC: 3.9 MMOL/L (ref 3.5–5.1)
PROTHROMBIN TIME: 19.5 SEC (ref 9.6–12)
RBC # BLD AUTO: 3.09 M/UL (ref 4.23–5.67)
RBC # BLD AUTO: 3.24 M/UL (ref 4.23–5.67)
RBC # BLD AUTO: 3.26 M/UL (ref 4.23–5.67)
SERVICE CMNT-IMP: NORMAL
SODIUM SERPL-SCNC: 144 MMOL/L (ref 136–145)
WBC # BLD AUTO: 5.5 K/UL (ref 4.3–11.1)
WBC # BLD AUTO: 5.8 K/UL (ref 4.3–11.1)
WBC # BLD AUTO: 6.6 K/UL (ref 4.3–11.1)

## 2017-03-23 PROCEDURE — 74011250636 HC RX REV CODE- 250/636: Performed by: FAMILY MEDICINE

## 2017-03-23 PROCEDURE — C1769 GUIDE WIRE: HCPCS

## 2017-03-23 PROCEDURE — 06CY3ZZ EXTIRPATION OF MATTER FROM LOWER VEIN, PERCUTANEOUS APPROACH: ICD-10-PCS | Performed by: RADIOLOGY

## 2017-03-23 PROCEDURE — 37214 CESSJ THERAPY CATH REMOVAL: CPT

## 2017-03-23 PROCEDURE — 36592 COLLECT BLOOD FROM PICC: CPT

## 2017-03-23 PROCEDURE — 77030002996 HC SUT SLK J&J -A

## 2017-03-23 PROCEDURE — 99153 MOD SED SAME PHYS/QHP EA: CPT

## 2017-03-23 PROCEDURE — 85384 FIBRINOGEN ACTIVITY: CPT | Performed by: RADIOLOGY

## 2017-03-23 PROCEDURE — 85610 PROTHROMBIN TIME: CPT | Performed by: RADIOLOGY

## 2017-03-23 PROCEDURE — 82962 GLUCOSE BLOOD TEST: CPT

## 2017-03-23 PROCEDURE — 85027 COMPLETE CBC AUTOMATED: CPT | Performed by: RADIOLOGY

## 2017-03-23 PROCEDURE — 74011250636 HC RX REV CODE- 250/636: Performed by: RADIOLOGY

## 2017-03-23 PROCEDURE — 80048 BASIC METABOLIC PNL TOTAL CA: CPT | Performed by: INTERNAL MEDICINE

## 2017-03-23 PROCEDURE — 99152 MOD SED SAME PHYS/QHP 5/>YRS: CPT

## 2017-03-23 PROCEDURE — 77030021532 HC CATH ANGI DX IMPRS MRTM -B

## 2017-03-23 PROCEDURE — 74011636320 HC RX REV CODE- 636/320: Performed by: RADIOLOGY

## 2017-03-23 PROCEDURE — 047C3ZZ DILATION OF RIGHT COMMON ILIAC ARTERY, PERCUTANEOUS APPROACH: ICD-10-PCS | Performed by: RADIOLOGY

## 2017-03-23 PROCEDURE — 74011000250 HC RX REV CODE- 250: Performed by: RADIOLOGY

## 2017-03-23 PROCEDURE — 047K3ZZ DILATION OF RIGHT FEMORAL ARTERY, PERCUTANEOUS APPROACH: ICD-10-PCS | Performed by: RADIOLOGY

## 2017-03-23 PROCEDURE — C1725 CATH, TRANSLUMIN NON-LASER: HCPCS

## 2017-03-23 PROCEDURE — 85730 THROMBOPLASTIN TIME PARTIAL: CPT | Performed by: INTERNAL MEDICINE

## 2017-03-23 PROCEDURE — 77030014007 HC SPNG HEMSTAT J&J -B

## 2017-03-23 PROCEDURE — 74011250636 HC RX REV CODE- 250/636: Performed by: INTERNAL MEDICINE

## 2017-03-23 PROCEDURE — 65610000001 HC ROOM ICU GENERAL

## 2017-03-23 PROCEDURE — C1894 INTRO/SHEATH, NON-LASER: HCPCS

## 2017-03-23 PROCEDURE — 047L3ZZ DILATION OF LEFT FEMORAL ARTERY, PERCUTANEOUS APPROACH: ICD-10-PCS | Performed by: RADIOLOGY

## 2017-03-23 PROCEDURE — C1757 CATH, THROMBECTOMY/EMBOLECT: HCPCS

## 2017-03-23 PROCEDURE — 36600 WITHDRAWAL OF ARTERIAL BLOOD: CPT

## 2017-03-23 PROCEDURE — 047D3ZZ DILATION OF LEFT COMMON ILIAC ARTERY, PERCUTANEOUS APPROACH: ICD-10-PCS | Performed by: RADIOLOGY

## 2017-03-23 PROCEDURE — 74011250636 HC RX REV CODE- 250/636

## 2017-03-23 PROCEDURE — 77030004524 HC CATH ANGI DX FLS COOK -B

## 2017-03-23 RX ORDER — ZOLPIDEM TARTRATE 5 MG/1
5 TABLET ORAL
Status: DISCONTINUED | OUTPATIENT
Start: 2017-03-23 | End: 2017-03-23 | Stop reason: SDUPTHER

## 2017-03-23 RX ORDER — HEPARIN SODIUM 5000 [USP'U]/ML
35 INJECTION, SOLUTION INTRAVENOUS; SUBCUTANEOUS ONCE
Status: DISCONTINUED | OUTPATIENT
Start: 2017-03-23 | End: 2017-03-23 | Stop reason: SDUPTHER

## 2017-03-23 RX ORDER — MIDAZOLAM HYDROCHLORIDE 1 MG/ML
.5-2 INJECTION, SOLUTION INTRAMUSCULAR; INTRAVENOUS
Status: DISCONTINUED | OUTPATIENT
Start: 2017-03-23 | End: 2017-03-25

## 2017-03-23 RX ORDER — HEPARIN SODIUM 5000 [USP'U]/100ML
18-36 INJECTION, SOLUTION INTRAVENOUS
Status: DISCONTINUED | OUTPATIENT
Start: 2017-03-23 | End: 2017-03-23

## 2017-03-23 RX ORDER — FENTANYL CITRATE 50 UG/ML
12.5-1 INJECTION, SOLUTION INTRAMUSCULAR; INTRAVENOUS
Status: DISCONTINUED | OUTPATIENT
Start: 2017-03-23 | End: 2017-03-25

## 2017-03-23 RX ORDER — LIDOCAINE HYDROCHLORIDE 20 MG/ML
2-20 INJECTION, SOLUTION INFILTRATION; PERINEURAL
Status: COMPLETED | OUTPATIENT
Start: 2017-03-23 | End: 2017-03-23

## 2017-03-23 RX ORDER — HEPARIN SODIUM 200 [USP'U]/100ML
1000 INJECTION, SOLUTION INTRAVENOUS
Status: DISCONTINUED | OUTPATIENT
Start: 2017-03-23 | End: 2017-03-25

## 2017-03-23 RX ORDER — CEFAZOLIN SODIUM IN 0.9 % NACL 2 G/50 ML
2 INTRAVENOUS SOLUTION, PIGGYBACK (ML) INTRAVENOUS ONCE
Status: COMPLETED | OUTPATIENT
Start: 2017-03-23 | End: 2017-03-24

## 2017-03-23 RX ORDER — HEPARIN SODIUM 5000 [USP'U]/ML
35 INJECTION, SOLUTION INTRAVENOUS; SUBCUTANEOUS ONCE
Status: COMPLETED | OUTPATIENT
Start: 2017-03-23 | End: 2017-03-23

## 2017-03-23 RX ORDER — LORAZEPAM 1 MG/1
1 TABLET ORAL
Status: DISCONTINUED | OUTPATIENT
Start: 2017-03-23 | End: 2017-04-04 | Stop reason: HOSPADM

## 2017-03-23 RX ORDER — HEPARIN SODIUM 5000 [USP'U]/ML
4200 INJECTION, SOLUTION INTRAVENOUS; SUBCUTANEOUS ONCE
Status: COMPLETED | OUTPATIENT
Start: 2017-03-23 | End: 2017-03-23

## 2017-03-23 RX ORDER — HEPARIN SODIUM 5000 [USP'U]/100ML
18-36 INJECTION, SOLUTION INTRAVENOUS
Status: DISCONTINUED | OUTPATIENT
Start: 2017-03-23 | End: 2017-03-29

## 2017-03-23 RX ORDER — SODIUM CHLORIDE 9 MG/ML
25 INJECTION, SOLUTION INTRAVENOUS ONCE
Status: COMPLETED | OUTPATIENT
Start: 2017-03-23 | End: 2017-03-24

## 2017-03-23 RX ORDER — DIPHENHYDRAMINE HYDROCHLORIDE 50 MG/ML
25-50 INJECTION, SOLUTION INTRAMUSCULAR; INTRAVENOUS ONCE
Status: ACTIVE | OUTPATIENT
Start: 2017-03-23 | End: 2017-03-23

## 2017-03-23 RX ADMIN — MIDAZOLAM HYDROCHLORIDE 1 MG: 1 INJECTION, SOLUTION INTRAMUSCULAR; INTRAVENOUS at 09:37

## 2017-03-23 RX ADMIN — FENTANYL CITRATE 50 MCG: 50 INJECTION, SOLUTION INTRAMUSCULAR; INTRAVENOUS at 09:37

## 2017-03-23 RX ADMIN — LIDOCAINE HYDROCHLORIDE 200 MG: 20 INJECTION, SOLUTION INFILTRATION; PERINEURAL at 09:30

## 2017-03-23 RX ADMIN — HEPARIN SODIUM 4200 UNITS: 5000 INJECTION, SOLUTION INTRAVENOUS; SUBCUTANEOUS at 12:44

## 2017-03-23 RX ADMIN — MIDAZOLAM HYDROCHLORIDE 0.5 MG: 1 INJECTION, SOLUTION INTRAMUSCULAR; INTRAVENOUS at 10:42

## 2017-03-23 RX ADMIN — MIDAZOLAM HYDROCHLORIDE 1 MG: 1 INJECTION, SOLUTION INTRAMUSCULAR; INTRAVENOUS at 10:12

## 2017-03-23 RX ADMIN — CEFAZOLIN 2 G: 1 INJECTION, POWDER, FOR SOLUTION INTRAMUSCULAR; INTRAVENOUS; PARENTERAL at 09:55

## 2017-03-23 RX ADMIN — IOPAMIDOL 70 ML: 755 INJECTION, SOLUTION INTRAVENOUS at 09:30

## 2017-03-23 RX ADMIN — Medication 10 ML: at 22:00

## 2017-03-23 RX ADMIN — Medication 4 MG: at 11:50

## 2017-03-23 RX ADMIN — Medication 4 MG: at 04:40

## 2017-03-23 RX ADMIN — HEPARIN SODIUM 4200 UNITS: 5000 INJECTION, SOLUTION INTRAVENOUS; SUBCUTANEOUS at 20:29

## 2017-03-23 RX ADMIN — FENTANYL CITRATE 50 MCG: 50 INJECTION, SOLUTION INTRAMUSCULAR; INTRAVENOUS at 09:44

## 2017-03-23 RX ADMIN — MIDAZOLAM HYDROCHLORIDE 0.5 MG: 1 INJECTION, SOLUTION INTRAMUSCULAR; INTRAVENOUS at 10:28

## 2017-03-23 RX ADMIN — FENTANYL CITRATE 25 MCG: 50 INJECTION, SOLUTION INTRAMUSCULAR; INTRAVENOUS at 10:28

## 2017-03-23 RX ADMIN — FENTANYL CITRATE 50 MCG: 50 INJECTION, SOLUTION INTRAMUSCULAR; INTRAVENOUS at 09:29

## 2017-03-23 RX ADMIN — Medication 4 MG: at 15:49

## 2017-03-23 RX ADMIN — FENTANYL CITRATE 25 MCG: 50 INJECTION, SOLUTION INTRAMUSCULAR; INTRAVENOUS at 10:42

## 2017-03-23 RX ADMIN — MIDAZOLAM HYDROCHLORIDE 1 MG: 1 INJECTION, SOLUTION INTRAMUSCULAR; INTRAVENOUS at 09:29

## 2017-03-23 RX ADMIN — SODIUM CHLORIDE 25 ML/HR: 900 INJECTION, SOLUTION INTRAVENOUS at 09:15

## 2017-03-23 RX ADMIN — HEPARIN SODIUM 2000 UNITS: 200 INJECTION, SOLUTION INTRAVENOUS at 10:30

## 2017-03-23 RX ADMIN — Medication 10 ML: at 13:10

## 2017-03-23 RX ADMIN — FENTANYL CITRATE 50 MCG: 50 INJECTION, SOLUTION INTRAMUSCULAR; INTRAVENOUS at 10:12

## 2017-03-23 RX ADMIN — MIDAZOLAM HYDROCHLORIDE 1 MG: 1 INJECTION, SOLUTION INTRAMUSCULAR; INTRAVENOUS at 09:44

## 2017-03-23 RX ADMIN — Medication 10 ML: at 04:41

## 2017-03-23 NOTE — PROGRESS NOTES
Dr. Gloris Kussmaul returned page and orders to stop TPA for 1 hour then restart TPA @ 0.5 mg/hr. Fibrinogen to be rechecked from restart time.

## 2017-03-23 NOTE — PROGRESS NOTES
TRANSFER - IN REPORT:    Verbal report received from RONNIE Leiva (name) on Melissa Batch  being received from IR(unit) for routine post - op      Report consisted of patients Situation, Background, Assessment and   Recommendations(SBAR). Information from the following report(s) SBAR, Kardex, OR Summary, Procedure Summary, MAR, Recent Results and Cardiac Rhythm SR was reviewed with the receiving nurse. Opportunity for questions and clarification was provided. Assessment completed upon patients arrival to unit and care assumed. Pt transferred to ICU for post op management. Dual skin assessment performed with Rosita Louis RN. Pt has blanchable red sacrum. Both lower legs are red with palpable pedal pulses. Allevyn put in place. Dual site assessment x2 of catheter entry/exit with RONNIE Leiva and this RN. Small amount of blood noted on dressings (boundaries marked). IV's dually checked and signed off. See flowsheet for full assessment. Pt VSS.

## 2017-03-23 NOTE — PROGRESS NOTES
Called lab for an update of patient's results which are as follows:     PT 19.2  INR-1.2  PTT 26.2  Fibrinogen-121.4

## 2017-03-23 NOTE — PROGRESS NOTES
Progress Note    Patient: Raoul Lee MRN: [id number]  SSN: [id number]    YOB: 1945  Age: 66 y.o. Sex: male      Admit Date: 3/21/2017    LOS: 2 days     Subjective:     65 yo male admitted for extensive LE DVT found via outpatient US. Pt had extended hospitalization this past month and was having leg pain during rehab. Pt was started on heparin infusion and IR was consulted. Pt in better spirits today after IR intervention. Legs less painful. No chest pain, dyspnea, N/V, or abd pain. Review of Systems:  Pertinent per HPI.     Medications:  Current Facility-Administered Medications   Medication Dose Route Frequency    diphenhydrAMINE (BENADRYL) injection 25-50 mg  25-50 mg IntraVENous ONCE    fentaNYL citrate (PF) injection 12.5-100 mcg  12.5-100 mcg IntraVENous Multiple    meperidine (DEMEROL) injection 13-50 mg  13-50 mg IntraVENous Multiple    midazolam (VERSED) injection 0.5-2 mg  0.5-2 mg IntraVENous Multiple    heparin (PF) 2 units/ml in NS infusion 2,000 Units  1,000 mL Irrigation Multiple    LORazepam (ATIVAN) tablet 1 mg  1 mg Oral BID PRN    heparin (porcine) injection 4,200 Units  35 Units/kg IntraVENous ONCE    heparin 25,000 units in dextrose 500 mL infusion  18-36 Units/kg/hr (Adjusted) IntraVENous TITRATE    zolpidem (AMBIEN) tablet 5 mg  5 mg Oral QHS PRN    ondansetron (ZOFRAN) injection 4 mg  4 mg IntraVENous Q6H PRN    HYDROcodone-acetaminophen (NORCO) 7.5-325 mg per tablet 1 Tab  1 Tab Oral Q6H PRN    morphine injection 4 mg  4 mg IntraVENous Q4H PRN    hydrALAZINE (APRESOLINE) 20 mg/mL injection 20 mg  20 mg IntraVENous Q6H PRN    0.9% sodium chloride infusion  35 mL/hr IntraVENous CONTINUOUS    0.9% sodium chloride infusion  35 mL/hr IntraVENous CONTINUOUS    heparin (PF) 2 units/ml in NS infusion  10 mL/hr IntraSHEAth CONTINUOUS    heparin (PF) 2 units/ml in NS infusion  10 mL/hr IntraSHEAth CONTINUOUS    sodium chloride (NS) flush 5-10 mL 5-10 mL IntraVENous Q8H    sodium chloride (NS) flush 5-10 mL  5-10 mL IntraVENous PRN    insulin lispro (HUMALOG) injection   SubCUTAneous AC&HS       Objective:     Vitals:    03/23/17 1033 03/23/17 1038 03/23/17 1053 03/23/17 1129   BP: (!) 159/95 (!) 184/91 (!) 173/92    Pulse: 82 82 80 77   Resp: 17 16 18    Temp:    98 °F (36.7 °C)   SpO2: 95% 96% 97%    Weight:       Height:            Physical Exam:   General: awake, alert, no apparent distress  Lungs: Clear to auscultation bilaterally. Heart: Regular rate and rhythm. Abdomen: Soft, nontender, nondistended. Bowel sounds normal.  Extremities:  Bilateral 3+ LE edema. Less discomfort to palpation of LE's than yesterday. Skin: Warm/dry. Psych: AOx3. Normal mood and affect.     Lab/Data Review:  Recent Results (from the past 24 hour(s))   GLUCOSE, POC    Collection Time: 03/22/17  2:26 PM   Result Value Ref Range    Glucose (POC) 84 65 - 100 mg/dL   GLUCOSE, POC    Collection Time: 03/22/17  6:38 PM   Result Value Ref Range    Glucose (POC) 93 65 - 100 mg/dL   FIBRINOGEN    Collection Time: 03/22/17  7:20 PM   Result Value Ref Range    Fibrinogen 121 (L) 172 - 437 mg/dL   PROTHROMBIN TIME + INR    Collection Time: 03/22/17  7:22 PM   Result Value Ref Range    Prothrombin time 13.2 (H) 9.6 - 12.0 sec    INR 1.2 0.9 - 1.2     PTT    Collection Time: 03/22/17  7:22 PM   Result Value Ref Range    aPTT 26.2 23.5 - 31.7 SEC   GLUCOSE, POC    Collection Time: 03/22/17  9:23 PM   Result Value Ref Range    Glucose (POC) 91 65 - 100 mg/dL   MRSA SCREEN - PCR (NASAL)    Collection Time: 03/22/17 10:30 PM   Result Value Ref Range    Special Requests: NO SPECIAL REQUESTS      Culture result:        MRSA target DNA is not detected (presumptive not colonized with MRSA)   CBC W/O DIFF    Collection Time: 03/22/17 11:00 PM   Result Value Ref Range    WBC 6.7 4.3 - 11.1 K/uL    RBC 3.47 (L) 4.23 - 5.67 M/uL    HGB 10.4 (L) 13.6 - 17.2 g/dL    HCT 32.7 (L) 41.1 - 50.3 % MCV 94.2 79.6 - 97.8 FL    MCH 30.0 26.1 - 32.9 PG    MCHC 31.8 31.4 - 35.0 g/dL    RDW 17.5 (H) 11.9 - 14.6 %    PLATELET 195 810 - 871 K/uL    MPV 9.2 (L) 10.8 - 14.1 FL   CBC W/O DIFF    Collection Time: 03/23/17  1:17 AM   Result Value Ref Range    WBC 6.6 4.3 - 11.1 K/uL    RBC 3.26 (L) 4.23 - 5.67 M/uL    HGB 9.7 (L) 13.6 - 17.2 g/dL    HCT 30.5 (L) 41.1 - 50.3 %    MCV 93.6 79.6 - 97.8 FL    MCH 29.7 26.1 - 32.9 PG    MCHC 31.8 31.4 - 35.0 g/dL    RDW 17.6 (H) 11.9 - 14.6 %    PLATELET 696 (L) 478 - 450 K/uL    MPV 8.9 (L) 10.8 - 14.1 FL   FIBRINOGEN    Collection Time: 03/23/17  1:17 AM   Result Value Ref Range    Fibrinogen 25 (LL) 172 - 980 mg/dL   METABOLIC PANEL, BASIC    Collection Time: 03/23/17  4:05 AM   Result Value Ref Range    Sodium 144 136 - 145 mmol/L    Potassium 3.9 3.5 - 5.1 mmol/L    Chloride 108 (H) 98 - 107 mmol/L    CO2 28 21 - 32 mmol/L    Anion gap 8 7 - 16 mmol/L    Glucose 93 65 - 100 mg/dL    BUN 12 8 - 23 MG/DL    Creatinine 1.05 0.8 - 1.5 MG/DL    GFR est AA >60 >60 ml/min/1.73m2    GFR est non-AA >60 >60 ml/min/1.73m2    Calcium 7.9 (L) 8.3 - 10.4 MG/DL   GLUCOSE, POC    Collection Time: 03/23/17  7:43 AM   Result Value Ref Range    Glucose (POC) 94 65 - 100 mg/dL   CBC W/O DIFF    Collection Time: 03/23/17  8:20 AM   Result Value Ref Range    WBC 5.8 4.3 - 11.1 K/uL    RBC 3.24 (L) 4.23 - 5.67 M/uL    HGB 9.6 (L) 13.6 - 17.2 g/dL    HCT 30.9 (L) 41.1 - 50.3 %    MCV 95.4 79.6 - 97.8 FL    MCH 29.6 26.1 - 32.9 PG    MCHC 31.1 (L) 31.4 - 35.0 g/dL    RDW 17.7 (H) 11.9 - 14.6 %    PLATELET 455 428 - 047 K/uL    MPV 9.2 (L) 10.8 - 14.1 FL   FIBRINOGEN    Collection Time: 03/23/17  8:20 AM   Result Value Ref Range    Fibrinogen 34 (LL) 172 - 437 mg/dL   GLUCOSE, POC    Collection Time: 03/23/17 11:37 AM   Result Value Ref Range    Glucose (POC) 93 65 - 100 mg/dL   CBC W/O DIFF    Collection Time: 03/23/17 12:00 PM   Result Value Ref Range    WBC 5.5 4.3 - 11.1 K/uL    RBC 3.09 (L) 4.23 - 5.67 M/uL    HGB 9.4 (L) 13.6 - 17.2 g/dL    HCT 29.1 (L) 41.1 - 50.3 %    MCV 94.2 79.6 - 97.8 FL    MCH 30.4 26.1 - 32.9 PG    MCHC 32.3 31.4 - 35.0 g/dL    RDW 17.9 (H) 11.9 - 14.6 %    PLATELET 062 (L) 402 - 450 K/uL    MPV 9.0 (L) 10.8 - 14.1 FL     I have reviewed new clinical data. Assessment:     Principal Problem:    DVT, lower extremity, proximal, acute ([address] (3/21/2017)    Active Problems:    Type 2 diabetes mellitus without complication ([address] (8/75/7952)      Benign non-nodular prostatic hyperplasia without lower urinary tract symptoms (7/22/2016)      Hypertriglyceridemia (7/22/2016)      Depression (7/22/2016)      Obstructive sleep apnea syndrome (7/22/2016)      Overview: Home CPAP      Morbid obesity due to excess calories ([address] (7/22/2016)      Primary insomnia (7/22/2016)      Benign essential HTN (9/13/2016)      Plan:     Heparin infusion  Pt s/p transcatheter EKOS and angiojet thrombectomy. Appreciate IR help.   Hydralazine PRN for hypertension    DVT prophylaxis: heparin  Disposition: transfer to floor when okay with IR    Signed By: Jaycee Machado DO     March 23, 2017

## 2017-03-23 NOTE — PROGRESS NOTES
Physical Therapy Note:    Therapist is discontinuing physical therapy at this time due to decline in medical status/transfer to ICU. Mr. Dawn Caceres was not able to participate in initial evaluation. Please reorder PT when our services are again appropriate. Thank you.     Isabel Urbina, VALENTIN  3/23/2017

## 2017-03-23 NOTE — PROGRESS NOTES
Pt central line dressing changed due to small amount of blood leaking out of dressing. Surgicel put in place around central line entry/exit site to help hemostasis due to pt being on heparin and alteplase. Pt tolerated well. Will continue to monitor.

## 2017-03-23 NOTE — PROGRESS NOTES
Bedside shift report received from Sony Crum [facility]. Dual skin assessment and gtts verified. BLE warm, pulses present/palpable, no bleeding/hematoma noted at insertion sites. Pt alert/oriented x4. VSS appears NAD. [place] draining clear/yellow urine. Will continue to monitor.

## 2017-03-23 NOTE — PROGRESS NOTES
Shift report given to Judah Salinas RN and Marky Agarwal RN. Pt resting in bed on EKOS x2. Catheter sites dually assessed. IV's dually signed off. Kardex, OR, recent results and dual skin assessment performed.

## 2017-03-23 NOTE — PROCEDURES
Interventional Radiology Brief Procedure Note    Patient: Geoff Cardozo MRN: [id number]  SSN: [id number]    YOB: 1945  Age: 66 y.o. Sex: male      Date of Procedure: 3/23/2017    Pre-Procedure Diagnosis: DVT Inferior caval thombosis    Post-Procedure Diagnosis: SAME    Procedure(s): post lysis check, pta and angiojet thombectomy. Brief Description of Procedure: see report    Performed By: Lauryn Almaraz MD     Assistants: None    Anesthesia: Moderate Sedation    Estimated Blood Loss: 300    Specimens: None    Implants: None    Findings: marked improved. Complications: None    Recommendations: follow up IR clinic. Follow Up: IR with me.     Signed By: Lauryn Almaraz MD     March 23, 2017

## 2017-03-23 NOTE — PROGRESS NOTES
Occupational Therapy Note:  Therapist is discharging patient from OT at this time due to decline in medical status and transfer to ICU. Please reconsult OT when MD deems patient appropriate for continued services. Thank you.   Kim Elias, OTR/L

## 2017-03-23 NOTE — PROGRESS NOTES
TRANSFER - OUT REPORT:    Verbal report given to [facility] RN(name) on Deatrice Mention  being transferred to 3110(unit) for ordered procedure       Report consisted of patients Situation, Background, Assessment and   Recommendations(SBAR). Information from the following report(s) Procedure Summary and MAR was reviewed with the receiving nurse. Lines:   Triple Lumen Central line 03/22/17 Right Subclavian (Active)   Central Line Being Utilized Yes 3/22/2017 10:31 PM   Criteria for Appropriate Use Limited/no vessel suitable for conventional peripheral access 3/22/2017 10:31 PM   Site Assessment Clean, dry, & intact 3/22/2017 10:31 PM   Infiltration Assessment 0 3/22/2017 10:31 PM   Affected Extremity/Extremities Color distal to insertion site pink (or appropriate for race); Pulses palpable;Range of motion performed 3/22/2017 10:31 PM   Date of Last Dressing Change 03/23/17 3/23/2017  1:29 AM   Dressing Status Clean, dry, & intact 3/22/2017 10:31 PM   Dressing Type Disk with Chlorhexadine gluconate (CHG); Tape;Transparent 3/22/2017 10:31 PM   Proximal Hub Color/Line Status White;Flushed; Infusing;Patent 3/22/2017 10:31 PM   Positive Blood Return (Medial Site) Yes 3/22/2017 10:31 PM   Medial Hub Color/Line Status Infusing;Flushed;Blue 3/22/2017 10:31 PM   Positive Blood Return (Lateral Site) Yes 3/22/2017 10:31 PM   Distal Hub Color/Line Status Infusing;Flushed;Brown 3/22/2017 10:31 PM   Positive Blood Return (Site #3) Yes 3/22/2017 10:31 PM   Alcohol Cap Used No 3/22/2017 10:31 PM       Peripheral IV 03/21/17 Left Antecubital (Active)   Site Assessment Clean, dry, & intact 3/22/2017 10:31 PM   Phlebitis Assessment 0 3/22/2017 10:31 PM   Infiltration Assessment 0 3/22/2017 10:31 PM   Dressing Status Clean, dry, & intact 3/22/2017 10:31 PM   Dressing Type Tape;Transparent 3/22/2017 10:31 PM   Hub Color/Line Status Blue 3/22/2017 10:31 PM   Alcohol Cap Used No 3/22/2017 10:31 PM        Opportunity for questions and clarification was provided.       Patient transported with:   Registered Nurse

## 2017-03-23 NOTE — INTERDISCIPLINARY ROUNDS
Interdisciplinary team rounds were held 3/23/2017 with the following team members:Care Management, Nursing, Nurse Practitioner, Nutrition, Palliative Care, Pastoral Care, Pharmacy, Physical Therapy, Physician, Physician's Assistant, Respiratory Therapy, Wound Care and Clinical Coordinator and the patient. Plan of care discussed. See clinical pathway and/or care plan for interventions and desired outcomes.

## 2017-03-23 NOTE — PROGRESS NOTES
TRANSFER - IN REPORT:    Verbal report received from RONNIE Angeles on [employer]  being received from IR for routine progression of care      Report consisted of patients Situation, Background, Assessment and   Recommendations(SBAR). Information from the following report(s) SBAR, Kardex, Procedure Summary, MAR, Med Rec Status and Cardiac Rhythm NSR was reviewed with the receiving nurse. Opportunity for questions and clarification was provided. Assessment completed upon patients arrival to unit and care assumed.

## 2017-03-23 NOTE — PROGRESS NOTES
TRANSFER - OUT REPORT:    Verbal report given to Dayo Mata RN (name) on Deatrice Mention  being transferred to ICU (unit) for routine progression of care       Report consisted of patients Situation, Background, Assessment and   Recommendations(SBAR). Information from the following report(s) Procedure Summary, MAR, Recent Results and Cardiac Rhythm SR was reviewed with the receiving nurse. Lines:   Triple Lumen Central line 03/22/17 Right Subclavian (Active)       Peripheral IV 03/21/17 Left Antecubital (Active)   Site Assessment Clean, dry, & intact 3/22/2017  4:40 PM   Phlebitis Assessment 0 3/22/2017  4:40 PM   Infiltration Assessment 0 3/22/2017  4:40 PM   Dressing Status Clean, dry, & intact 3/22/2017  4:40 PM   Dressing Type Disk with Chlorhexadine gluconate (CHG) 3/22/2017  4:40 PM   Hub Color/Line Status Blue 3/22/2017  4:40 PM        Opportunity for questions and clarification was provided.       Patient transported with:   Monitor  O2 @ 2 liters  Registered Nurse

## 2017-03-23 NOTE — PROGRESS NOTES
LEAPFROG PROTOCOL NOTE    Geoff Cardozo  3/23/2017    The patient is currently in the critical care setting managed by Dr. Dillan Estes with DVT. The patient's chart is reviewed and the patient is discussed with the staff. Patient is currently hemodynamically stable. Patient has no needs identified for Intensivist management in the critical care setting at this time. Please notify us if can be of assistance. No charge billed to the patient. Thank you.     Eric Pearson MD

## 2017-03-23 NOTE — PROGRESS NOTES
Shift report received from Danni Mejia RN and Marky Agarwal RN. Pt resting in bed on 2L NC. Pt is alert and oriented. Kardex, MAR, procedure summary, dual skin assessment and bilateral popliteal assessment performed. IV heparin checked and dually signed off. See flowsheet for full assessment.  VSS

## 2017-03-24 LAB
ANION GAP BLD CALC-SCNC: 8 MMOL/L (ref 7–16)
APTT PPP: 57.5 SEC (ref 23.5–31.7)
APTT PPP: 60.9 SEC (ref 23.5–31.7)
APTT PPP: 70.3 SEC (ref 23.5–31.7)
BUN SERPL-MCNC: 14 MG/DL (ref 8–23)
CALCIUM SERPL-MCNC: 7.7 MG/DL (ref 8.3–10.4)
CHLORIDE SERPL-SCNC: 108 MMOL/L (ref 98–107)
CO2 SERPL-SCNC: 28 MMOL/L (ref 21–32)
CREAT SERPL-MCNC: 1.17 MG/DL (ref 0.8–1.5)
GLUCOSE BLD STRIP.AUTO-MCNC: 103 MG/DL (ref 65–100)
GLUCOSE BLD STRIP.AUTO-MCNC: 113 MG/DL (ref 65–100)
GLUCOSE BLD STRIP.AUTO-MCNC: 124 MG/DL (ref 65–100)
GLUCOSE BLD STRIP.AUTO-MCNC: 136 MG/DL (ref 65–100)
GLUCOSE SERPL-MCNC: 109 MG/DL (ref 65–100)
INR PPP: 1.2 (ref 0.9–1.2)
POTASSIUM SERPL-SCNC: 3.9 MMOL/L (ref 3.5–5.1)
PROTHROMBIN TIME: 12.7 SEC (ref 9.6–12)
SODIUM SERPL-SCNC: 144 MMOL/L (ref 136–145)

## 2017-03-24 PROCEDURE — 36600 WITHDRAWAL OF ARTERIAL BLOOD: CPT

## 2017-03-24 PROCEDURE — 85730 THROMBOPLASTIN TIME PARTIAL: CPT | Performed by: INTERNAL MEDICINE

## 2017-03-24 PROCEDURE — 74011250637 HC RX REV CODE- 250/637: Performed by: INTERNAL MEDICINE

## 2017-03-24 PROCEDURE — 85610 PROTHROMBIN TIME: CPT | Performed by: INTERNAL MEDICINE

## 2017-03-24 PROCEDURE — 80048 BASIC METABOLIC PNL TOTAL CA: CPT | Performed by: RADIOLOGY

## 2017-03-24 PROCEDURE — 74011000302 HC RX REV CODE- 302: Performed by: INTERNAL MEDICINE

## 2017-03-24 PROCEDURE — 65270000029 HC RM PRIVATE

## 2017-03-24 PROCEDURE — 82962 GLUCOSE BLOOD TEST: CPT

## 2017-03-24 PROCEDURE — 74011250636 HC RX REV CODE- 250/636: Performed by: FAMILY MEDICINE

## 2017-03-24 PROCEDURE — 97162 PT EVAL MOD COMPLEX 30 MIN: CPT

## 2017-03-24 PROCEDURE — 86580 TB INTRADERMAL TEST: CPT | Performed by: INTERNAL MEDICINE

## 2017-03-24 PROCEDURE — 77030012890

## 2017-03-24 PROCEDURE — 74011250636 HC RX REV CODE- 250/636: Performed by: INTERNAL MEDICINE

## 2017-03-24 RX ORDER — FACIAL-BODY WIPES
10 EACH TOPICAL DAILY PRN
Status: DISCONTINUED | OUTPATIENT
Start: 2017-03-24 | End: 2017-04-04 | Stop reason: HOSPADM

## 2017-03-24 RX ADMIN — Medication 10 ML: at 15:17

## 2017-03-24 RX ADMIN — ZOLPIDEM TARTRATE 5 MG: 5 TABLET ORAL at 01:03

## 2017-03-24 RX ADMIN — HEPARIN SODIUM AND DEXTROSE 20 UNITS/KG/HR: 5000; 5 INJECTION INTRAVENOUS at 15:46

## 2017-03-24 RX ADMIN — BISACODYL 10 MG: 10 SUPPOSITORY RECTAL at 10:30

## 2017-03-24 RX ADMIN — HEPARIN SODIUM AND DEXTROSE 20 UNITS/KG/HR: 5000; 5 INJECTION INTRAVENOUS at 00:26

## 2017-03-24 RX ADMIN — ZOLPIDEM TARTRATE 5 MG: 5 TABLET ORAL at 22:05

## 2017-03-24 RX ADMIN — WARFARIN SODIUM 5 MG: 2 TABLET ORAL at 18:23

## 2017-03-24 RX ADMIN — Medication 4 MG: at 22:05

## 2017-03-24 RX ADMIN — TUBERCULIN PURIFIED PROTEIN DERIVATIVE 5 UNITS: 5 INJECTION INTRADERMAL at 15:00

## 2017-03-24 RX ADMIN — Medication 10 ML: at 22:07

## 2017-03-24 NOTE — PROGRESS NOTES
Interdisciplinary team rounds were held 3/24/2017 with the following team members:Nursing, Nurse Practitioner, Nutrition, Palliative Care, Pharmacy, Physical Therapy, Physician, Respiratory Therapy and Clinical Coordinator and the patient. Plan of care discussed. See clinical pathway and/or care plan for interventions and desired outcomes.

## 2017-03-24 NOTE — PROGRESS NOTES
TRANSFER - OUT REPORT:    Verbal report given to Ludy(dionne) on Sha Henriquez  being transferred to 6th floor(unit) for routine progression of care       Report consisted of patients Situation, Background, Assessment and   Recommendations(SBAR). Information from the following report(s) SBAR was reviewed with the receiving nurse. Opportunity for questions and clarification was provided.

## 2017-03-24 NOTE — PROGRESS NOTES
Warfarin dosing per pharmacist    Linda Santiago is a 66 y.o. male. Height: 5' 6\" (167.6 cm)    Weight: 126.2 kg (278 lb 4.8 oz)    Indication:  Bilateral LE DVT, thrombosed IVC    Goal INR:  2-3    Home dose:  New start    Risk factors or significant drug interactions: Other anticoagulants:  Heparin bridge    Daily Monitoring  Date  INR     Warfarin dose HGB              Notes  3/24  1.8  5 mg  9.4    Pharmacy consulted to dose pt coumadin on 3/24. Pt s/p EKOS and now starting coumadin bridged with heparin. Pt INR on 3/23 1.2 and jumped to 1.8 without any doses of coumadin. Will give 5 mg starting tonight and may need to decrease dose pending response. Will follow daily.           Thank you,  Rossi Harris, PharmD  Clinical Pharmacist  [phone]

## 2017-03-24 NOTE — PROGRESS NOTES
Progress Note    Patient: Tamica Sánchez MRN: [id number]  SSN: [id number]    YOB: 1945  Age: 66 y.o. Sex: male      Admit Date: 3/21/2017    LOS: 3 days     Subjective:     65 yo male admitted for extensive LE DVT found via outpatient US. Pt had extended hospitalization this past month and was having leg pain during rehab. Pt was started on heparin infusion and IR was consulted. No new complaints. No chest pain, dyspnea, N/V, or abd pain. Review of Systems:  Pertinent per HPI.     Medications:  Current Facility-Administered Medications   Medication Dose Route Frequency    bisacodyl (DULCOLAX) suppository 10 mg  10 mg Rectal DAILY PRN    fentaNYL citrate (PF) injection 12.5-100 mcg  12.5-100 mcg IntraVENous Multiple    meperidine (DEMEROL) injection 13-50 mg  13-50 mg IntraVENous Multiple    midazolam (VERSED) injection 0.5-2 mg  0.5-2 mg IntraVENous Multiple    heparin (PF) 2 units/ml in NS infusion 2,000 Units  1,000 mL Irrigation Multiple    LORazepam (ATIVAN) tablet 1 mg  1 mg Oral BID PRN    heparin 25,000 units in dextrose 500 mL infusion  18-36 Units/kg/hr (Adjusted) IntraVENous TITRATE    zolpidem (AMBIEN) tablet 5 mg  5 mg Oral QHS PRN    ondansetron (ZOFRAN) injection 4 mg  4 mg IntraVENous Q6H PRN    HYDROcodone-acetaminophen (NORCO) 7.5-325 mg per tablet 1 Tab  1 Tab Oral Q6H PRN    morphine injection 4 mg  4 mg IntraVENous Q4H PRN    hydrALAZINE (APRESOLINE) 20 mg/mL injection 20 mg  20 mg IntraVENous Q6H PRN    0.9% sodium chloride infusion  35 mL/hr IntraVENous CONTINUOUS    0.9% sodium chloride infusion  35 mL/hr IntraVENous CONTINUOUS    heparin (PF) 2 units/ml in NS infusion  10 mL/hr IntraSHEAth CONTINUOUS    heparin (PF) 2 units/ml in NS infusion  10 mL/hr IntraSHEAth CONTINUOUS    sodium chloride (NS) flush 5-10 mL  5-10 mL IntraVENous Q8H    sodium chloride (NS) flush 5-10 mL  5-10 mL IntraVENous PRN    insulin lispro (HUMALOG) injection SubCUTAneous AC&HS       Objective:     Vitals:    03/24/17 0901 03/24/17 1001 03/24/17 1127 03/24/17 1130   BP: 150/84 169/66  147/57   Pulse: 100 (!) 103  97   Resp: 23 30  18   Temp:    98.6 °F (37 °C)   SpO2: 94% 96% 98%    Weight:       Height:            Physical Exam:   General: awake, alert, no apparent distress  Lungs: Clear to auscultation bilaterally. Heart: Regular rate and rhythm. Abdomen: Soft, nontender, nondistended. Bowel sounds normal.  Extremities:  Bilateral 3+ LE edema. Skin: Warm/dry. Psych: AOx3. Normal mood and affect.     Lab/Data Review:  Recent Results (from the past 24 hour(s))   CBC W/O DIFF    Collection Time: 03/23/17 12:00 PM   Result Value Ref Range    WBC 5.5 4.3 - 11.1 K/uL    RBC 3.09 (L) 4.23 - 5.67 M/uL    HGB 9.4 (L) 13.6 - 17.2 g/dL    HCT 29.1 (L) 41.1 - 50.3 %    MCV 94.2 79.6 - 97.8 FL    MCH 30.4 26.1 - 32.9 PG    MCHC 32.3 31.4 - 35.0 g/dL    RDW 17.9 (H) 11.9 - 14.6 %    PLATELET 545 (L) 177 - 450 K/uL    MPV 9.0 (L) 10.8 - 14.1 FL   PROTHROMBIN TIME + INR    Collection Time: 03/23/17  2:10 PM   Result Value Ref Range    Prothrombin time 19.5 (H) 9.6 - 12.0 sec    INR 1.8 (H) 0.9 - 1.2     GLUCOSE, POC    Collection Time: 03/23/17  4:40 PM   Result Value Ref Range    Glucose (POC) 95 65 - 100 mg/dL   PTT    Collection Time: 03/23/17  6:37 PM   Result Value Ref Range    aPTT 49.2 (H) 23.5 - 31.7 SEC   GLUCOSE, POC    Collection Time: 03/23/17  9:10 PM   Result Value Ref Range    Glucose (POC) 123 (H) 65 - 100 mg/dL   PTT    Collection Time: 03/24/17  2:15 AM   Result Value Ref Range    aPTT 70.3 (H) 23.5 - 64.7 SEC   METABOLIC PANEL, BASIC    Collection Time: 03/24/17  3:30 AM   Result Value Ref Range    Sodium 144 136 - 145 mmol/L    Potassium 3.9 3.5 - 5.1 mmol/L    Chloride 108 (H) 98 - 107 mmol/L    CO2 28 21 - 32 mmol/L    Anion gap 8 7 - 16 mmol/L    Glucose 109 (H) 65 - 100 mg/dL    BUN 14 8 - 23 MG/DL    Creatinine 1.17 0.8 - 1.5 MG/DL    GFR est AA >60 >60 ml/min/1.73m2    GFR est non-AA >60 >60 ml/min/1.73m2    Calcium 7.7 (L) 8.3 - 10.4 MG/DL   GLUCOSE, POC    Collection Time: 03/24/17  7:41 AM   Result Value Ref Range    Glucose (POC) 124 (H) 65 - 100 mg/dL   PTT    Collection Time: 03/24/17  8:15 AM   Result Value Ref Range    aPTT 60.9 (H) 23.5 - 31.7 SEC   GLUCOSE, POC    Collection Time: 03/24/17 11:37 AM   Result Value Ref Range    Glucose (POC) 136 (H) 65 - 100 mg/dL     I have reviewed new clinical data. Assessment:     Principal Problem:    DVT, lower extremity, proximal, acute ([address] (3/21/2017)    Active Problems:    Type 2 diabetes mellitus without complication ([address] (9/28/3963)      Benign non-nodular prostatic hyperplasia without lower urinary tract symptoms (7/22/2016)      Hypertriglyceridemia (7/22/2016)      Depression (7/22/2016)      Obstructive sleep apnea syndrome (7/22/2016)      Overview: Home CPAP      Morbid obesity due to excess calories ([address] (7/22/2016)      Primary insomnia (7/22/2016)      Benign essential HTN (9/13/2016)      Plan:     Heparin infusion  Appreciate IR  Resume home medications  I have discussed risks, benefits, and alternatives to different oral anticoagulant drugs. Pt has opted to start warfarin for anticoagulation. Will start tonight.   PT  PPD    DVT prophylaxis: heparin  Disposition: transfer to floor    Signed By: Jason Womack DO     March 24, 2017

## 2017-03-24 NOTE — PROGRESS NOTES
TRANSFER - IN REPORT:    Verbal report received from Ant Baez RN on [employer]  being received from ICU for routine progression of care      Report consisted of patients Situation, Background, Assessment and   Recommendations(SBAR). Information from the following report(s) SBAR, Kardex, Procedure Summary, MAR and Recent Results was reviewed with the receiving nurse. Opportunity for questions and clarification was provided. Assessment completed upon patients arrival to unit and care assumed.

## 2017-03-24 NOTE — PROGRESS NOTES
Department of Interventional Radiology  [phone]                                 Progress Note  Patient: Francisca Patterson MRN: [id number]  SSN: [id number]    YOB: 1945  Age: 66 y.o. Sex: male      Subjective:   Feels better although legs don't look better. Objective:     Vitals:    03/24/17 0401 03/24/17 0501 03/24/17 0601 03/24/17 0740   BP: 142/55 158/60 160/65    Pulse: 96 97 96    Resp: 18 15 21    Temp: 98.4 °F (36.9 °C)   98.3 °F (36.8 °C)   SpO2: 94% 94% 96%    Weight:       Height:          Intake and Output:             Physical Exam:   HEART: regular rate and rhythm  LUNG: clear to auscultation bilaterally  ABDOMEN: normal findings: soft, non-tender  EXTREMITIES: warm, 3 + LE edema, tender, perfused  Lab/Data Review:  BMP:   Lab Results   Component Value Date/Time     03/24/2017 03:30 AM    K 3.9 03/24/2017 03:30 AM     (H) 03/24/2017 03:30 AM    CO2 28 03/24/2017 03:30 AM    AGAP 8 03/24/2017 03:30 AM     (H) 03/24/2017 03:30 AM    BUN 14 03/24/2017 03:30 AM    CREA 1.17 03/24/2017 03:30 AM    GFRAA >60 03/24/2017 03:30 AM    GFRNA >60 03/24/2017 03:30 AM     CMP:   Lab Results   Component Value Date/Time     03/24/2017 03:30 AM    K 3.9 03/24/2017 03:30 AM     (H) 03/24/2017 03:30 AM    CO2 28 03/24/2017 03:30 AM    AGAP 8 03/24/2017 03:30 AM     (H) 03/24/2017 03:30 AM    BUN 14 03/24/2017 03:30 AM    CREA 1.17 03/24/2017 03:30 AM    GFRAA >60 03/24/2017 03:30 AM    GFRNA >60 03/24/2017 03:30 AM    CA 7.7 (L) 03/24/2017 03:30 AM     Assessment:   Bilateral LE DVT, thrombosed IVC. Pt reports that he feels better.   Legs remain very edematous-little improvement    Plan:   Transfer  Transition to oral anticoagulant-per hospitalist  Encouraged wt loss  Increase activity, ambulate  Compression hose    Signed By: Kyler Spears PA-C     March 24, 2017

## 2017-03-24 NOTE — PROGRESS NOTES
Problem: Mobility Impaired (Adult and Pediatric)  Goal: *Acute Goals and Plan of Care (Insert Text)  STG:  (1.)Mr. Alexandre Yee will move from supine to sit and sit to supine , scoot up and down and roll side to side with MINIMAL ASSIST within 5 day(s). (2.)Mr. Alexandre Yee will transfer from bed to chair and chair to bed with MODERATE ASSIST using the least restrictive device within 5 day(s). (3.)Mr. Alexandre Yee will ambulate with MODERATE ASSIST for 5 feet with the least restrictive device within 5 day(s). (4.)Mr. Alexandre Yee will tolerate 25 minutes of therapeutic activity/exercise within 5 days in order to Improve activity tolerance for mobility. (5.)Mr. Alexandre Yee will perform LE exercises with 1 to 2 cues for form within 3 days to improve strength for functional transfers and ambulation. LTG:  (1.)Mr. Alexandre Yee will move from supine to sit and sit to supine , scoot up and down and roll side to side in bed with CONTACT GUARD ASSIST within 10 day(s). (2.)Mr. Alexandre Yee will transfer from bed to chair and chair to bed with MINIMAL ASSIST using the least restrictive device within 10 day(s). (3.)Mr. Alexandre Yee will ambulate with MINIMAL ASSIST for 25 feet with the least restrictive device within 10 day(s). Goals to be updated as patient progresses.   ________________________________________________________________________________________________      PHYSICAL THERAPY: INITIAL ASSESSMENT, PM 3/24/2017  INPATIENT: Hospital Day: 4  [place] / Product Type: Medicare /      NAME/AGE/GENDER: Marge Alberts is a 66 y.o. male            PRIMARY DIAGNOSIS: DVT, lower extremity, proximal, acute, bilateral (HCC) DVT, lower extremity, proximal, acute ([address] DVT, lower extremity, proximal, acute ([facility] [address]        ICD-10: Treatment Diagnosis:       · Difficulty in walking, Not elsewhere classified (R26.2)   Precaution/Allergies:  Sulfite       ASSESSMENT:      Mr. Alexandre Yee is a 66 y.o. male with extensive DVTs of the LEs. Per RN, patient has been off of ACTIVASE >24 hours and on heparin for >24 hours and he is cleared to participate. He has been at rehab and it is unclear how much he has been doing there. At times, he reports that he has been ambulating at rehab, at others he reports he hasn't stood in 2 weeks. He was agreeable to therapy and present supine, calling for assistance to be cleaned after having bowel movement. He rolled to B sides with moderate assistance to be cleaned and then transferred to sitting via log roll technique (due to recent spinal compression fractures). He required moderate/maximal assistance x2, however did exhibit ability to assist. He sat for a few minutes, became tearful as he appears overwhelmed. He did not feel that he could attempt standing today due to severe pain of posterior legs (after surgeries). He returned to supine with moderate assistance x2 and good control with log roll technique. Encouraged patient to call for assistance when he feels he needs to have a bowel movement to avoid ulceration. He is in agreement. Doyle Rueda is currently functioning below his baseline and would benefit from skilled PT during acute care stay to maximize safety and independence with functional mobility. This section established at most recent assessment   PROBLEM LIST (Impairments causing functional limitations):  1. Decreased Strength  2. Decreased ADL/Functional Activities  3. Decreased Transfer Abilities  4. Decreased Ambulation Ability/Technique  5. Decreased Balance  6. Increased Pain  7. Decreased Knowledge of Precautions  8. Decreased Gray with Home Exercise Program    INTERVENTIONS PLANNED: (Benefits and precautions of physical therapy have been discussed with the patient.)  1. Balance Exercise  2. Bed Mobility  3. Family Education  4. Gait Training  5. Home Exercise Program (HEP)  6. Therapeutic Activites  7. Therapeutic Exercise/Strengthening  8.  Transfer Training  9. Patient Education  10. Group Therapy      TREATMENT PLAN: Frequency/Duration: 3 times a week for duration of hospital stay  Rehabilitation Potential For Stated Goals: FAIR      RECOMMENDED REHABILITATION/EQUIPMENT: (at time of discharge pending progress): Continue Skilled Therapy and Rehab. HISTORY:   History of Present Injury/Illness (Reason for Referral):  Per MD Note: \"Eliud Márquez is a 66 y.o. male that presented to the ED with 2 week history of worsening swelling of the bilateral lower extremities with increasing difficulty with ambulation. He had OP US today showing extensive DVT. He is currently undergoing rehab at [facility] following admission at [facility] for JIM requiring 2 runs of HD. He has history of DVT in 2013 and had IVC filter placed at that time. Patient denies dyspnea or chest pain. The hospitalist have been asked to admit. \"  Past Medical History/Comorbidities:   Mr. Wendy Morales  has a past medical history of Calculus of kidney; Diabetes ([address] and DVT (deep venous thrombosis) ([facility] [address] (2013). Mr. Wendy Moralse  has a past surgical history that includes urological.  Social History/Living Environment:   Home Environment: Skilled nursing facility  One/Two Story Residence: One story  Living Alone: No  Support Systems: Spouse/Significant Other/Partner  Patient Expects to be Discharged to[de-identified] Private residence  Current DME Used/Available at Home: None  Prior Level of Function/Work/Activity:  Patient ambulatory prior to initial hospitalization. Unsure of current \"baseline\" at rehab. Number of Personal Factors/Comorbidities that affect the Plan of Care: 1-2: MODERATE COMPLEXITY   EXAMINATION:   Most Recent Physical Functioning:   Gross Assessment:  Strength: Generally decreased, functional  Coordination: Generally decreased, functional               Posture:  Posture (WDL): Exceptions to WDL  Posture Assessment:  Forward head, Rounded shoulders  Balance:    Bed Mobility:  Rolling: Moderate assistance  Supine to Sit: Moderate assistance;Assist x2  Sit to Supine: Moderate assistance;Assist x2  Scooting: Maximum assistance  Wheelchair Mobility:     Transfers:  Sit to Stand:  (did not attempt)  Gait:             Body Structures Involved:  1. Heart  2. Lungs  3. Muscles Body Functions Affected:  1. Sensory/Pain  2. Hematological  3. Neuromusculoskeletal  4. Movement Related Activities and Participation Affected:  1. Mobility  2. Self Care  3. Domestic Life  4. Interpersonal Interactions and Relationships  5. Community, Social and [place]   Number of elements that affect the Plan of Care: 4+: HIGH COMPLEXITY   CLINICAL PRESENTATION:   Presentation: Evolving clinical presentation with changing clinical characteristics: MODERATE COMPLEXITY   CLINICAL DECISION MAKIN [facility] Inpatient Short Form  How much difficulty does the patient currently have. .. Unable A Lot A Little None   1. Turning over in bed (including adjusting bedclothes, sheets and blankets)? [ ] 1   [X] 2   [ ] 3   [ ] 4   2. Sitting down on and standing up from a chair with arms ( e.g., wheelchair, bedside commode, etc.)   [X] 1   [ ] 2   [ ] 3   [ ] 4   3. Moving from lying on back to sitting on the side of the bed? [ ] 1   [X] 2   [ ] 3   [ ] 4   How much help from another person does the patient currently need. .. Total A Lot A Little None   4. Moving to and from a bed to a chair (including a wheelchair)? [X] 1   [ ] 2   [ ] 3   [ ] 4   5. Need to walk in hospital room? [X] 1   [ ] 2   [ ] 3   [ ] 4   6. Climbing 3-5 steps with a railing? [X] 1   [ ] 2   [ ] 3   [ ] 4   © 2007, Trustees of [address] [place] [address] [address] [address], under license to [employer]. All rights reserved    Score:  Initial: 8 Most Recent: X (Date: -- )     Interpretation of Tool:  Represents activities that are increasingly more difficult (i.e. Bed mobility, Transfers, Gait).        Score 24 23 22-20 19-15 14-10 9-7 6       Modifier CH CI CJ CK CL CM CN         · Mobility - Walking and Moving Around:               - CURRENT STATUS:    CM - 80%-99% impaired, limited or restricted               - GOAL STATUS:           CL - 60%-79% impaired, limited or restricted               - D/C STATUS:                       ---------------To be determined---------------  [place] / [place] / Product Type: Medicare /       Medical Necessity:     · Patient demonstrates good rehab potential due to higher previous functional level. Reason for Services/Other Comments:  · Patient continues to require modification of therapeutic interventions to increase complexity of exercises. Use of outcome tool(s) and clinical judgement create a POC that gives a: Questionable prediction of patient's progress: MODERATE COMPLEXITY                 TREATMENT:   (In addition to Assessment/Re-Assessment sessions the following treatments were rendered)   Pre-treatment Symptoms/Complaints:  Patient with no complaints. Pain: Initial:   Pain Intensity 1: 0  Post Session:  No increased pain once at rest (increased pain with movement of LEs)      Assessment/Reassessment only, no treatment provided today     Braces/Orthotics/Lines/Etc:   · IV  · magana catheter  · O2 Device: Room air  Treatment/Session Assessment:    · Response to Treatment:  Patient tolerated treatment well. · Interdisciplinary Collaboration:  · Physical Therapist  · Registered Nurse  · After treatment position/precautions:  · Supine in bed  · Bed/Chair-wheels locked  · Bed in low position  · Call light within reach  · RN notified  · Compliance with Program/Exercises: Will assess as treatment progresses. · Recommendations/Intent for next treatment session: \"Next visit will focus on advancements to more challenging activities and reduction in assistance provided\".   Total Treatment Duration:  PT Patient Time In/Time Out  Time In: 1441  Time Out: [facility], DPT

## 2017-03-24 NOTE — PROGRESS NOTES
Shift report given to [facility], RN. IV Heparin gtt dually checked. Could not sign off on computer due to technical difficulties. MAR, SBAR, recent results, dual site and skin assessment performed.  VSS

## 2017-03-24 NOTE — PROGRESS NOTES
Bedside shift report received from [facility]. Patient is resting in bed, alert and oriented. VSS.

## 2017-03-25 LAB
APTT PPP: 30.5 SEC (ref 23.5–31.7)
APTT PPP: 58.3 SEC (ref 23.5–31.7)
APTT PPP: 83.5 SEC (ref 23.5–31.7)
GLUCOSE BLD STRIP.AUTO-MCNC: 123 MG/DL (ref 65–100)
GLUCOSE BLD STRIP.AUTO-MCNC: 145 MG/DL (ref 65–100)
GLUCOSE BLD STRIP.AUTO-MCNC: 147 MG/DL (ref 65–100)
GLUCOSE BLD STRIP.AUTO-MCNC: 165 MG/DL (ref 65–100)
INR PPP: 1.1 (ref 0.9–1.2)
MM INDURATION POC: NORMAL MM (ref 0–5)
PPD POC: NORMAL NEGATIVE
PROTHROMBIN TIME: 12.5 SEC (ref 9.6–12)

## 2017-03-25 PROCEDURE — 74011250636 HC RX REV CODE- 250/636: Performed by: INTERNAL MEDICINE

## 2017-03-25 PROCEDURE — 65270000029 HC RM PRIVATE

## 2017-03-25 PROCEDURE — 85610 PROTHROMBIN TIME: CPT | Performed by: INTERNAL MEDICINE

## 2017-03-25 PROCEDURE — 74011250637 HC RX REV CODE- 250/637: Performed by: RADIOLOGY

## 2017-03-25 PROCEDURE — 74011250636 HC RX REV CODE- 250/636: Performed by: FAMILY MEDICINE

## 2017-03-25 PROCEDURE — 74011250637 HC RX REV CODE- 250/637: Performed by: INTERNAL MEDICINE

## 2017-03-25 PROCEDURE — 82962 GLUCOSE BLOOD TEST: CPT

## 2017-03-25 PROCEDURE — 74011250636 HC RX REV CODE- 250/636

## 2017-03-25 PROCEDURE — 36415 COLL VENOUS BLD VENIPUNCTURE: CPT | Performed by: INTERNAL MEDICINE

## 2017-03-25 PROCEDURE — 85730 THROMBOPLASTIN TIME PARTIAL: CPT | Performed by: INTERNAL MEDICINE

## 2017-03-25 RX ORDER — FUROSEMIDE 10 MG/ML
40 INJECTION INTRAMUSCULAR; INTRAVENOUS ONCE
Status: COMPLETED | OUTPATIENT
Start: 2017-03-25 | End: 2017-03-25

## 2017-03-25 RX ORDER — OLMESARTAN MEDOXOMIL 40 MG/1
40 TABLET ORAL DAILY
Status: DISCONTINUED | OUTPATIENT
Start: 2017-03-25 | End: 2017-04-04 | Stop reason: HOSPADM

## 2017-03-25 RX ORDER — FAMOTIDINE 20 MG/1
20 TABLET, FILM COATED ORAL 2 TIMES DAILY
Status: DISCONTINUED | OUTPATIENT
Start: 2017-03-25 | End: 2017-04-04 | Stop reason: HOSPADM

## 2017-03-25 RX ORDER — SAME BUTANEDISULFONATE/BETAINE 400-600 MG
250 POWDER IN PACKET (EA) ORAL 2 TIMES DAILY
Status: DISCONTINUED | OUTPATIENT
Start: 2017-03-25 | End: 2017-04-04 | Stop reason: HOSPADM

## 2017-03-25 RX ORDER — HEPARIN SODIUM 5000 [USP'U]/ML
5000 INJECTION, SOLUTION INTRAVENOUS; SUBCUTANEOUS ONCE
Status: COMPLETED | OUTPATIENT
Start: 2017-03-25 | End: 2017-03-25

## 2017-03-25 RX ORDER — CHOLESTYRAMINE 4 G/4.8G
4 POWDER, FOR SUSPENSION ORAL 2 TIMES DAILY WITH MEALS
Status: DISCONTINUED | OUTPATIENT
Start: 2017-03-25 | End: 2017-03-29

## 2017-03-25 RX ORDER — POLYETHYLENE GLYCOL 3350 17 G/17G
17 POWDER, FOR SOLUTION ORAL DAILY
Status: DISCONTINUED | OUTPATIENT
Start: 2017-03-25 | End: 2017-03-30

## 2017-03-25 RX ORDER — TAMSULOSIN HYDROCHLORIDE 0.4 MG/1
0.4 CAPSULE ORAL DAILY
Status: DISCONTINUED | OUTPATIENT
Start: 2017-03-25 | End: 2017-04-04 | Stop reason: HOSPADM

## 2017-03-25 RX ADMIN — HYDROCODONE BITARTRATE AND ACETAMINOPHEN 1 TABLET: 7.5; 325 TABLET ORAL at 08:45

## 2017-03-25 RX ADMIN — FAMOTIDINE 20 MG: 20 TABLET, FILM COATED ORAL at 11:48

## 2017-03-25 RX ADMIN — RDII 250 MG CAPSULE 250 MG: at 19:20

## 2017-03-25 RX ADMIN — TAMSULOSIN HYDROCHLORIDE 0.4 MG: 0.4 CAPSULE ORAL at 11:48

## 2017-03-25 RX ADMIN — Medication 10 ML: at 22:09

## 2017-03-25 RX ADMIN — ZOLPIDEM TARTRATE 5 MG: 5 TABLET ORAL at 22:09

## 2017-03-25 RX ADMIN — HYDROCODONE BITARTRATE AND ACETAMINOPHEN 1 TABLET: 7.5; 325 TABLET ORAL at 01:43

## 2017-03-25 RX ADMIN — WARFARIN SODIUM 5 MG: 2 TABLET ORAL at 19:19

## 2017-03-25 RX ADMIN — CHOLESTYRAMINE 4 G: 4 POWDER, FOR SUSPENSION ORAL at 11:47

## 2017-03-25 RX ADMIN — HEPARIN SODIUM AND DEXTROSE 24 UNITS/KG/HR: 5000; 5 INJECTION INTRAVENOUS at 08:40

## 2017-03-25 RX ADMIN — POLYETHYLENE GLYCOL 3350 17 G: 17 POWDER, FOR SOLUTION ORAL at 11:48

## 2017-03-25 RX ADMIN — FUROSEMIDE 40 MG: 10 INJECTION, SOLUTION INTRAMUSCULAR; INTRAVENOUS at 16:01

## 2017-03-25 RX ADMIN — INSULIN LISPRO 3 UNITS: 100 INJECTION, SOLUTION INTRAVENOUS; SUBCUTANEOUS at 11:48

## 2017-03-25 RX ADMIN — FAMOTIDINE 20 MG: 20 TABLET, FILM COATED ORAL at 19:20

## 2017-03-25 RX ADMIN — OLMESARTAN MEDOXOMIL 40 MG: 40 TABLET, FILM COATED ORAL at 11:48

## 2017-03-25 RX ADMIN — HYDROCODONE BITARTRATE AND ACETAMINOPHEN 1 TABLET: 7.5; 325 TABLET ORAL at 23:52

## 2017-03-25 RX ADMIN — RDII 250 MG CAPSULE 250 MG: at 11:48

## 2017-03-25 RX ADMIN — HEPARIN SODIUM AND DEXTROSE 22 UNITS/KG/HR: 5000; 5 INJECTION INTRAVENOUS at 23:43

## 2017-03-25 RX ADMIN — Medication 10 ML: at 16:01

## 2017-03-25 RX ADMIN — CHOLESTYRAMINE 4 G: 4 POWDER, FOR SUSPENSION ORAL at 19:18

## 2017-03-25 RX ADMIN — Medication 4 MG: at 22:08

## 2017-03-25 RX ADMIN — HEPARIN SODIUM 5000 UNITS: 5000 INJECTION, SOLUTION INTRAVENOUS; SUBCUTANEOUS at 11:48

## 2017-03-25 RX ADMIN — LORAZEPAM 1 MG: 1 TABLET ORAL at 01:43

## 2017-03-25 NOTE — PROGRESS NOTES
IV heparin rate has been adjusted based on the most recent PTT results.     Lab Results      Component                Value               Date/Time                 aPTT                     83.5                03/25/2017 02:40 PM   Rate increased to 22 units/kg/hr repeat PTT in 6 hours

## 2017-03-25 NOTE — PROGRESS NOTES
Warfarin dosing per pharmacist    Ana Maria Amaya is a 66 y.o. male. Height: 5' 6\" (167.6 cm)    Weight: 126.2 kg (278 lb 4.8 oz)    Indication:  Bilateral LE DVT, thrombosed IVC    Goal INR:  2-3    Home dose:  New start    Risk factors or significant drug interactions: Other anticoagulants:  Heparin bridge    Daily Monitoring  Date  INR     Warfarin dose HGB              Notes  3/24  1.8  5 mg  9.4  3/25  1.1  5 mg   ---    Pharmacy consulted to dose pt coumadin on 3/24. Pt s/p EKOS and now starting coumadin bridged with heparin. Possible impact of EKOS on INR appears to have resolved as INR has now dropped down to 1.1 as would be expected. Will continue with 5 mg QPM for now. If INR does not start to trend up by day 3 or 4, will increase dose. Will follow INR daily moving forward.     Thank you,  Marla Yo, PharmD  Clinical Pharmacist  [phone]

## 2017-03-25 NOTE — PROGRESS NOTES
Pt has called numerous times this shift to be pulled up in the bed, have his head lowered and lifted,legs lowered and lifted have his pillow straightened, legs moved repeatedly, lotion to be rubbed on his back and bottom areas repeatedly. Nurses and 2 assistants have taken care of all of these needs. He reported to Nurse that he has called for over 2 hours and no one has been in there. Nurse had to remind him that someone has been coming into his room approximately every 30 minutes when he has called.

## 2017-03-25 NOTE — PROGRESS NOTES
Progress Note    Patient: Jennifer Peraza MRN: [id number]  SSN: [id number]    YOB: 1945  Age: 66 y.o. Sex: male      Admit Date: 3/21/2017    LOS: 4 days     Subjective:     65 yo male admitted for extensive LE DVT found via outpatient US. Pt had extended hospitalization this past month and was having leg pain during rehab. Pt was started on heparin infusion and IR was consulted. No new complaints. States he slept well last night. No chest pain, dyspnea, N/V, or abd pain. Review of Systems:  Pertinent per HPI.     Medications:  Current Facility-Administered Medications   Medication Dose Route Frequency    bisacodyl (DULCOLAX) suppository 10 mg  10 mg Rectal DAILY PRN    warfarin (COUMADIN) tablet 5 mg  5 mg Oral QPM    fentaNYL citrate (PF) injection 12.5-100 mcg  12.5-100 mcg IntraVENous Multiple    meperidine (DEMEROL) injection 13-50 mg  13-50 mg IntraVENous Multiple    midazolam (VERSED) injection 0.5-2 mg  0.5-2 mg IntraVENous Multiple    heparin (PF) 2 units/ml in NS infusion 2,000 Units  1,000 mL Irrigation Multiple    LORazepam (ATIVAN) tablet 1 mg  1 mg Oral BID PRN    heparin 25,000 units in dextrose 500 mL infusion  18-36 Units/kg/hr (Adjusted) IntraVENous TITRATE    zolpidem (AMBIEN) tablet 5 mg  5 mg Oral QHS PRN    ondansetron (ZOFRAN) injection 4 mg  4 mg IntraVENous Q6H PRN    HYDROcodone-acetaminophen (NORCO) 7.5-325 mg per tablet 1 Tab  1 Tab Oral Q6H PRN    morphine injection 4 mg  4 mg IntraVENous Q4H PRN    hydrALAZINE (APRESOLINE) 20 mg/mL injection 20 mg  20 mg IntraVENous Q6H PRN    0.9% sodium chloride infusion  35 mL/hr IntraVENous CONTINUOUS    0.9% sodium chloride infusion  35 mL/hr IntraVENous CONTINUOUS    heparin (PF) 2 units/ml in NS infusion  10 mL/hr IntraSHEAth CONTINUOUS    heparin (PF) 2 units/ml in NS infusion  10 mL/hr IntraSHEAth CONTINUOUS    sodium chloride (NS) flush 5-10 mL  5-10 mL IntraVENous Q8H    sodium chloride (NS) flush 5-10 mL  5-10 mL IntraVENous PRN    insulin lispro (HUMALOG) injection   SubCUTAneous AC&HS       Objective:     Vitals:    03/24/17 1823 03/24/17 1904 03/25/17 0300 03/25/17 0717   BP: 143/57 146/66 136/61 142/56   Pulse: 96 94 97 89   Resp: 20 20 18 19   Temp: 98.8 °F (37.1 °C) 99 °F (37.2 °C) 98.3 °F (36.8 °C) 98.4 °F (36.9 °C)   SpO2: 96% 98% 95% 96%   Weight:       Height:            Physical Exam:   General: awake, alert, no apparent distress  Lungs: Clear to auscultation bilaterally. Heart: Regular rate and rhythm. Abdomen: Soft, nontender, nondistended. Bowel sounds normal.  Extremities:  Bilateral 2-3+ LE edema. Psych: AOx3. Normal mood and affect. Lab/Data Review:  Recent Results (from the past 24 hour(s))   GLUCOSE, POC    Collection Time: 03/24/17 11:37 AM   Result Value Ref Range    Glucose (POC) 136 (H) 65 - 100 mg/dL   PTT    Collection Time: 03/24/17  2:45 PM   Result Value Ref Range    aPTT 57.5 (H) 23.5 - 31.7 SEC   PROTHROMBIN TIME + INR    Collection Time: 03/24/17  2:45 PM   Result Value Ref Range    Prothrombin time 12.7 (H) 9.6 - 12.0 sec    INR 1.2 0.9 - 1.2     GLUCOSE, POC    Collection Time: 03/24/17  4:45 PM   Result Value Ref Range    Glucose (POC) 113 (H) 65 - 100 mg/dL   GLUCOSE, POC    Collection Time: 03/24/17  8:47 PM   Result Value Ref Range    Glucose (POC) 103 (H) 65 - 100 mg/dL   PROTHROMBIN TIME + INR    Collection Time: 03/25/17  6:47 AM   Result Value Ref Range    Prothrombin time 12.5 (H) 9.6 - 12.0 sec    INR 1.1 0.9 - 1.2     PTT    Collection Time: 03/25/17  6:47 AM   Result Value Ref Range    aPTT 30.5 23.5 - 31.7 SEC   GLUCOSE, POC    Collection Time: 03/25/17  7:38 AM   Result Value Ref Range    Glucose (POC) 123 (H) 65 - 100 mg/dL     I have reviewed new clinical data.     Assessment:     Principal Problem:    DVT, lower extremity, proximal, acute ([facility] [address] (3/21/2017)    Active Problems:    Type 2 diabetes mellitus without complication ([facility] (2/06/1530) Benign non-nodular prostatic hyperplasia without lower urinary tract symptoms (7/22/2016)      Hypertriglyceridemia (7/22/2016)      Depression (7/22/2016)      Obstructive sleep apnea syndrome (7/22/2016)      Overview: Home CPAP      Morbid obesity due to excess calories ([facility] [address] (7/22/2016)      Primary insomnia (7/22/2016)      Benign essential HTN (9/13/2016)      Plan:     Heparin infusion  Warfarin per pharmacy dosing  Daily INR  Home meds resumed    DVT prophylaxis: heparin  Disposition: will need SNF likely    Signed By: Sharmila Isbell DO     March 25, 2017

## 2017-03-26 LAB
ANION GAP BLD CALC-SCNC: 11 MMOL/L (ref 7–16)
APTT PPP: 37.8 SEC (ref 23.5–31.7)
APTT PPP: 42.7 SEC (ref 23.5–31.7)
APTT PPP: 45.5 SEC (ref 23.5–31.7)
APTT PPP: 59.5 SEC (ref 23.5–31.7)
BUN SERPL-MCNC: 12 MG/DL (ref 8–23)
CALCIUM SERPL-MCNC: 7.9 MG/DL (ref 8.3–10.4)
CHLORIDE SERPL-SCNC: 105 MMOL/L (ref 98–107)
CO2 SERPL-SCNC: 26 MMOL/L (ref 21–32)
CREAT SERPL-MCNC: 1.02 MG/DL (ref 0.8–1.5)
ERYTHROCYTE [DISTWIDTH] IN BLOOD BY AUTOMATED COUNT: 18.3 % (ref 11.9–14.6)
FIBRINOGEN PPP-MCNC: 359 MG/DL (ref 172–437)
GLUCOSE BLD STRIP.AUTO-MCNC: 132 MG/DL (ref 65–100)
GLUCOSE BLD STRIP.AUTO-MCNC: 142 MG/DL (ref 65–100)
GLUCOSE BLD STRIP.AUTO-MCNC: 158 MG/DL (ref 65–100)
GLUCOSE SERPL-MCNC: 121 MG/DL (ref 65–100)
HAPTOGLOB SERPL-MCNC: 128 MG/DL (ref 30–200)
HCT VFR BLD AUTO: 20.8 % (ref 41.1–50.3)
HCT VFR BLD AUTO: 22.8 % (ref 41.1–50.3)
HGB BLD-MCNC: 6.7 G/DL (ref 13.6–17.2)
HGB BLD-MCNC: 7.1 G/DL (ref 13.6–17.2)
INR PPP: 1.2 (ref 0.9–1.2)
LDH SERPL L TO P-CCNC: 329 U/L (ref 110–210)
MCH RBC QN AUTO: 30.5 PG (ref 26.1–32.9)
MCHC RBC AUTO-ENTMCNC: 32.2 G/DL (ref 31.4–35)
MCV RBC AUTO: 94.5 FL (ref 79.6–97.8)
MM INDURATION POC: NORMAL MM (ref 0–5)
PLATELET # BLD AUTO: 198 K/UL (ref 150–450)
PMV BLD AUTO: 9.4 FL (ref 10.8–14.1)
POTASSIUM SERPL-SCNC: 3.8 MMOL/L (ref 3.5–5.1)
PPD POC: NORMAL NEGATIVE
PROTHROMBIN TIME: 12.7 SEC (ref 9.6–12)
RBC # BLD AUTO: 2.2 M/UL (ref 4.23–5.67)
SODIUM SERPL-SCNC: 142 MMOL/L (ref 136–145)
WBC # BLD AUTO: 8.1 K/UL (ref 4.3–11.1)

## 2017-03-26 PROCEDURE — 86078 PHYS BLOOD BANK SERV REACTJ: CPT | Performed by: INTERNAL MEDICINE

## 2017-03-26 PROCEDURE — 86900 BLOOD TYPING SEROLOGIC ABO: CPT | Performed by: INTERNAL MEDICINE

## 2017-03-26 PROCEDURE — 86923 COMPATIBILITY TEST ELECTRIC: CPT | Performed by: INTERNAL MEDICINE

## 2017-03-26 PROCEDURE — 85730 THROMBOPLASTIN TIME PARTIAL: CPT | Performed by: INTERNAL MEDICINE

## 2017-03-26 PROCEDURE — 36415 COLL VENOUS BLD VENIPUNCTURE: CPT | Performed by: INTERNAL MEDICINE

## 2017-03-26 PROCEDURE — 30233N1 TRANSFUSION OF NONAUTOLOGOUS RED BLOOD CELLS INTO PERIPHERAL VEIN, PERCUTANEOUS APPROACH: ICD-10-PCS | Performed by: INTERNAL MEDICINE

## 2017-03-26 PROCEDURE — 36430 TRANSFUSION BLD/BLD COMPNT: CPT

## 2017-03-26 PROCEDURE — 77030013131 HC IV BLD ST ICUM -A

## 2017-03-26 PROCEDURE — 74011250636 HC RX REV CODE- 250/636: Performed by: INTERNAL MEDICINE

## 2017-03-26 PROCEDURE — 83010 ASSAY OF HAPTOGLOBIN QUANT: CPT | Performed by: INTERNAL MEDICINE

## 2017-03-26 PROCEDURE — 85027 COMPLETE CBC AUTOMATED: CPT | Performed by: INTERNAL MEDICINE

## 2017-03-26 PROCEDURE — 82962 GLUCOSE BLOOD TEST: CPT

## 2017-03-26 PROCEDURE — 65270000029 HC RM PRIVATE

## 2017-03-26 PROCEDURE — 74011250637 HC RX REV CODE- 250/637: Performed by: INTERNAL MEDICINE

## 2017-03-26 PROCEDURE — 80048 BASIC METABOLIC PNL TOTAL CA: CPT | Performed by: INTERNAL MEDICINE

## 2017-03-26 PROCEDURE — 85384 FIBRINOGEN ACTIVITY: CPT | Performed by: INTERNAL MEDICINE

## 2017-03-26 PROCEDURE — 83615 LACTATE (LD) (LDH) ENZYME: CPT | Performed by: INTERNAL MEDICINE

## 2017-03-26 PROCEDURE — 85610 PROTHROMBIN TIME: CPT | Performed by: INTERNAL MEDICINE

## 2017-03-26 PROCEDURE — 74011250637 HC RX REV CODE- 250/637: Performed by: RADIOLOGY

## 2017-03-26 PROCEDURE — P9016 RBC LEUKOCYTES REDUCED: HCPCS | Performed by: INTERNAL MEDICINE

## 2017-03-26 PROCEDURE — 85018 HEMOGLOBIN: CPT | Performed by: INTERNAL MEDICINE

## 2017-03-26 RX ORDER — FUROSEMIDE 10 MG/ML
40 INJECTION INTRAMUSCULAR; INTRAVENOUS ONCE
Status: COMPLETED | OUTPATIENT
Start: 2017-03-26 | End: 2017-03-26

## 2017-03-26 RX ORDER — SODIUM CHLORIDE 9 MG/ML
75 INJECTION, SOLUTION INTRAVENOUS ONCE
Status: COMPLETED | OUTPATIENT
Start: 2017-03-26 | End: 2017-03-26

## 2017-03-26 RX ORDER — ACETAMINOPHEN 325 MG/1
650 TABLET ORAL
Status: DISCONTINUED | OUTPATIENT
Start: 2017-03-26 | End: 2017-04-04 | Stop reason: HOSPADM

## 2017-03-26 RX ORDER — DIPHENHYDRAMINE HYDROCHLORIDE 50 MG/ML
25 INJECTION, SOLUTION INTRAMUSCULAR; INTRAVENOUS ONCE
Status: COMPLETED | OUTPATIENT
Start: 2017-03-26 | End: 2017-03-26

## 2017-03-26 RX ORDER — HEPARIN SODIUM 5000 [USP'U]/ML
5000 INJECTION, SOLUTION INTRAVENOUS; SUBCUTANEOUS ONCE
Status: COMPLETED | OUTPATIENT
Start: 2017-03-27 | End: 2017-03-27

## 2017-03-26 RX ORDER — HEPARIN SODIUM 5000 [USP'U]/100ML
30 INJECTION, SOLUTION INTRAVENOUS
Status: DISCONTINUED | OUTPATIENT
Start: 2017-03-27 | End: 2017-03-26 | Stop reason: SDUPTHER

## 2017-03-26 RX ORDER — SODIUM CHLORIDE 9 MG/ML
250 INJECTION, SOLUTION INTRAVENOUS AS NEEDED
Status: DISCONTINUED | OUTPATIENT
Start: 2017-03-26 | End: 2017-03-29

## 2017-03-26 RX ADMIN — FUROSEMIDE 40 MG: 10 INJECTION, SOLUTION INTRAMUSCULAR; INTRAVENOUS at 08:52

## 2017-03-26 RX ADMIN — SODIUM CHLORIDE 75 ML/HR: 900 INJECTION, SOLUTION INTRAVENOUS at 20:00

## 2017-03-26 RX ADMIN — FAMOTIDINE 20 MG: 20 TABLET, FILM COATED ORAL at 17:26

## 2017-03-26 RX ADMIN — Medication 10 ML: at 22:00

## 2017-03-26 RX ADMIN — HYDROCODONE BITARTRATE AND ACETAMINOPHEN 1 TABLET: 7.5; 325 TABLET ORAL at 09:58

## 2017-03-26 RX ADMIN — FAMOTIDINE 20 MG: 20 TABLET, FILM COATED ORAL at 08:37

## 2017-03-26 RX ADMIN — WARFARIN SODIUM 5 MG: 2 TABLET ORAL at 17:25

## 2017-03-26 RX ADMIN — OLMESARTAN MEDOXOMIL 40 MG: 40 TABLET, FILM COATED ORAL at 08:36

## 2017-03-26 RX ADMIN — POLYETHYLENE GLYCOL 3350 17 G: 17 POWDER, FOR SOLUTION ORAL at 08:38

## 2017-03-26 RX ADMIN — TAMSULOSIN HYDROCHLORIDE 0.4 MG: 0.4 CAPSULE ORAL at 08:36

## 2017-03-26 RX ADMIN — CHOLESTYRAMINE 4 G: 4 POWDER, FOR SUSPENSION ORAL at 08:38

## 2017-03-26 RX ADMIN — RDII 250 MG CAPSULE 250 MG: at 17:25

## 2017-03-26 RX ADMIN — DIPHENHYDRAMINE HYDROCHLORIDE 25 MG: 50 INJECTION, SOLUTION INTRAMUSCULAR; INTRAVENOUS at 19:17

## 2017-03-26 RX ADMIN — HYDROCODONE BITARTRATE AND ACETAMINOPHEN 1 TABLET: 7.5; 325 TABLET ORAL at 23:36

## 2017-03-26 RX ADMIN — HEPARIN SODIUM AND DEXTROSE 26 UNITS/KG/HR: 5000; 5 INJECTION INTRAVENOUS at 14:02

## 2017-03-26 RX ADMIN — RDII 250 MG CAPSULE 250 MG: at 08:36

## 2017-03-26 RX ADMIN — Medication 10 ML: at 13:09

## 2017-03-26 RX ADMIN — ACETAMINOPHEN 650 MG: 325 TABLET, FILM COATED ORAL at 19:17

## 2017-03-26 NOTE — PROGRESS NOTES
Pt spiked temp after blood started. Blood stopped and MD notified. Urine specimen obtained. Discussed with daughter and Lab.

## 2017-03-26 NOTE — PROGRESS NOTES
Warfarin dosing per pharmacist    Geoff Cardozo is a 66 y.o. male. Height: 5' 6\" (167.6 cm)    Weight: 126.2 kg (278 lb 4.8 oz)    Indication:  Bilateral LE DVT, thrombosed IVC    Goal INR:  2-3    Home dose:  New start    Risk factors or significant drug interactions: Other anticoagulants:  Heparin bridge    Daily Monitoring  Date  INR     Warfarin dose HGB              Notes  3/24  1.8  5 mg  9.4  3/25  1.1  5 mg   ---  3/26  1.2  5 mg  7.1    Pharmacy consulted to dose pt coumadin on 3/24. Pt s/p EKOS and now starting coumadin bridged with heparin. INR starting to trend up, now at 1.2. Will continue with 5 mg QPM for now. May need to increase warfarin dose tomorrow if INR does not rise appropriately. Will follow INR daily moving forward.     Thank you,  Alvin Roland, PharmD  Clinical Pharmacist  [phone]

## 2017-03-26 NOTE — PROGRESS NOTES
Type and cross match still not obtained. Also waiting for 1300 PTT. Called lab. Pt a difficult stick.

## 2017-03-26 NOTE — PROGRESS NOTES
Progress Note    Patient: Whit Campos MRN: [id number]  SSN: [id number]    YOB: 1945  Age: 66 y.o. Sex: male      Admit Date: 3/21/2017    LOS: 5 days     Subjective:     65 yo male admitted for extensive LE DVT found via outpatient US. Pt had extended hospitalization this past month and was having leg pain during rehab. Pt was started on heparin infusion and IR was consulted. No new complaints today. He thinks swelling has decreased some. No chest pain, dyspnea, N/V, or abd pain. Review of Systems:  Pertinent per HPI.     Medications:  Current Facility-Administered Medications   Medication Dose Route Frequency    famotidine (PEPCID) tablet 20 mg  20 mg Oral BID    polyethylene glycol (MIRALAX) packet 17 g  17 g Oral DAILY    Saccharomyces boulardii (FLORASTOR) capsule 250 mg  250 mg Oral BID    tamsulosin (FLOMAX) capsule 0.4 mg  0.4 mg Oral DAILY    cholestyramine light (QUESTRAN LITE) packet 4 g  4 g Oral BID WITH MEALS    olmesartan (BENICAR) tablet 40 mg  40 mg Oral DAILY    bisacodyl (DULCOLAX) suppository 10 mg  10 mg Rectal DAILY PRN    warfarin (COUMADIN) tablet 5 mg  5 mg Oral QPM    LORazepam (ATIVAN) tablet 1 mg  1 mg Oral BID PRN    heparin 25,000 units in dextrose 500 mL infusion  18-36 Units/kg/hr (Adjusted) IntraVENous TITRATE    zolpidem (AMBIEN) tablet 5 mg  5 mg Oral QHS PRN    ondansetron (ZOFRAN) injection 4 mg  4 mg IntraVENous Q6H PRN    HYDROcodone-acetaminophen (NORCO) 7.5-325 mg per tablet 1 Tab  1 Tab Oral Q6H PRN    morphine injection 4 mg  4 mg IntraVENous Q4H PRN    hydrALAZINE (APRESOLINE) 20 mg/mL injection 20 mg  20 mg IntraVENous Q6H PRN    sodium chloride (NS) flush 5-10 mL  5-10 mL IntraVENous Q8H    sodium chloride (NS) flush 5-10 mL  5-10 mL IntraVENous PRN    insulin lispro (HUMALOG) injection   SubCUTAneous AC&HS       Objective:     Vitals:    03/25/17 1448 03/25/17 1911 03/25/17 2304 03/26/17 0400   BP: 157/64 120/57 122/60 134/74   Pulse: 69 (!) 101 89 84   Resp: 18 16 16 18   Temp: 98.4 °F (36.9 °C) 98.3 °F (36.8 °C) 98.2 °F (36.8 °C) 98.1 °F (36.7 °C)   SpO2: 97% 95% 95% 95%   Weight:       Height:            Physical Exam:   General: awake, alert, no apparent distress  Lungs: Clear to auscultation bilaterally. Heart: Regular rate and rhythm. Abdomen: Soft, nontender, nondistended. Bowel sounds normal.  Extremities:  Bilateral 2-3+ LE edema. Mild UE edema bilaterally. Skin: brusing in dependent areas of forearms bilaterally as well as left upper chest wall. Psych: AOx3. Normal mood and affect.     Lab/Data Review:  Recent Results (from the past 24 hour(s))   GLUCOSE, POC    Collection Time: 03/25/17 11:30 AM   Result Value Ref Range    Glucose (POC) 165 (H) 65 - 100 mg/dL   PTT    Collection Time: 03/25/17  2:40 PM   Result Value Ref Range    aPTT 83.5 (H) 23.5 - 31.7 SEC   GLUCOSE, POC    Collection Time: 03/25/17  2:50 PM   Result Value Ref Range    Glucose (POC) 145 (H) 65 - 100 mg/dL   GLUCOSE, POC    Collection Time: 03/25/17  8:34 PM   Result Value Ref Range    Glucose (POC) 147 (H) 65 - 100 mg/dL   PTT    Collection Time: 03/25/17  9:45 PM   Result Value Ref Range    aPTT 58.3 (H) 23.5 - 31.7 SEC   CBC W/O DIFF    Collection Time: 03/26/17  4:11 AM   Result Value Ref Range    WBC 8.1 4.3 - 11.1 K/uL    RBC 2.20 (L) 4.23 - 5.67 M/uL    HGB 6.7 (LL) 13.6 - 17.2 g/dL    HCT 20.8 (LL) 41.1 - 50.3 %    MCV 94.5 79.6 - 97.8 FL    MCH 30.5 26.1 - 32.9 PG    MCHC 32.2 31.4 - 35.0 g/dL    RDW 18.3 (H) 11.9 - 14.6 %    PLATELET 340 756 - 754 K/uL    MPV 9.4 (L) 10.8 - 61.6 FL   METABOLIC PANEL, BASIC    Collection Time: 03/26/17  4:11 AM   Result Value Ref Range    Sodium 142 136 - 145 mmol/L    Potassium 3.8 3.5 - 5.1 mmol/L    Chloride 105 98 - 107 mmol/L    CO2 26 21 - 32 mmol/L    Anion gap 11 7 - 16 mmol/L    Glucose 121 (H) 65 - 100 mg/dL    BUN 12 8 - 23 MG/DL    Creatinine 1.02 0.8 - 1.5 MG/DL    GFR est AA >60 >60 ml/min/1.73m2    GFR est non-AA >60 >60 ml/min/1.73m2    Calcium 7.9 (L) 8.3 - 10.4 MG/DL   PTT    Collection Time: 03/26/17  4:11 AM   Result Value Ref Range    aPTT 42.7 (H) 23.5 - 31.7 SEC   PROTHROMBIN TIME + INR    Collection Time: 03/26/17  4:11 AM   Result Value Ref Range    Prothrombin time 12.7 (H) 9.6 - 12.0 sec    INR 1.2 0.9 - 1.2       I have reviewed new clinical data. Assessment:     Principal Problem:    DVT, lower extremity, proximal, acute ([address] (3/21/2017)    Active Problems:    Type 2 diabetes mellitus without complication ([address] (9/88/3404)      Benign non-nodular prostatic hyperplasia without lower urinary tract symptoms (7/22/2016)      Hypertriglyceridemia (7/22/2016)      Depression (7/22/2016)      Obstructive sleep apnea syndrome (7/22/2016)      Overview: Home CPAP      Morbid obesity due to excess calories ([address] (7/22/2016)      Primary insomnia (7/22/2016)      Benign essential HTN (9/13/2016)      Plan:     Heparin infusion  Warfarin per pharmacy dosing  Daily INR  H/H rechecked and still low. Transfuse 1 unit PRBC and check LDH, haptoglobin, and fibrinogen. Pt had brown BM today with no evidence of bleeding.   Give another dose of Lasix today    DVT prophylaxis: heparin  Disposition: will need SNF likely    Signed By: Coco Borrero,      March 26, 2017

## 2017-03-27 LAB
ANION GAP BLD CALC-SCNC: 11 MMOL/L (ref 7–16)
APTT PPP: 31.4 SEC (ref 23.5–31.7)
APTT PPP: 77.6 SEC (ref 23.5–31.7)
BASOPHILS # BLD AUTO: 0 K/UL (ref 0–0.2)
BASOPHILS # BLD: 0 % (ref 0–2)
BUN SERPL-MCNC: 10 MG/DL (ref 8–23)
CALCIUM SERPL-MCNC: 7.7 MG/DL (ref 8.3–10.4)
CHLORIDE SERPL-SCNC: 105 MMOL/L (ref 98–107)
CO2 SERPL-SCNC: 24 MMOL/L (ref 21–32)
CREAT SERPL-MCNC: 1.04 MG/DL (ref 0.8–1.5)
DIFFERENTIAL METHOD BLD: ABNORMAL
EOSINOPHIL # BLD: 0.2 K/UL (ref 0–0.8)
EOSINOPHIL NFR BLD: 2 % (ref 0.5–7.8)
ERYTHROCYTE [DISTWIDTH] IN BLOOD BY AUTOMATED COUNT: 18.6 % (ref 11.9–14.6)
GLUCOSE BLD STRIP.AUTO-MCNC: 110 MG/DL (ref 65–100)
GLUCOSE BLD STRIP.AUTO-MCNC: 125 MG/DL (ref 65–100)
GLUCOSE BLD STRIP.AUTO-MCNC: 126 MG/DL (ref 65–100)
GLUCOSE BLD STRIP.AUTO-MCNC: 126 MG/DL (ref 65–100)
GLUCOSE SERPL-MCNC: 118 MG/DL (ref 65–100)
HCT VFR BLD AUTO: 20.1 % (ref 41.1–50.3)
HGB BLD-MCNC: 6.3 G/DL (ref 13.6–17.2)
IMM GRANULOCYTES # BLD: 0.1 K/UL (ref 0–0.5)
INR PPP: 1.7 (ref 0.9–1.2)
LYMPHOCYTES # BLD AUTO: 20 % (ref 13–44)
LYMPHOCYTES # BLD: 1.8 K/UL (ref 0.5–4.6)
MCH RBC QN AUTO: 30.4 PG (ref 26.1–32.9)
MCHC RBC AUTO-ENTMCNC: 31.3 G/DL (ref 31.4–35)
MCV RBC AUTO: 97.1 FL (ref 79.6–97.8)
MM INDURATION POC: NORMAL MM (ref 0–5)
MONOCYTES # BLD: 0.9 K/UL (ref 0.1–1.3)
MONOCYTES NFR BLD AUTO: 10 % (ref 4–12)
NEUTS SEG # BLD: 6.1 K/UL (ref 1.7–8.2)
NEUTS SEG NFR BLD AUTO: 68 % (ref 43–78)
PLATELET # BLD AUTO: 217 K/UL (ref 150–450)
PLATELET COMMENTS,PCOM: ADEQUATE
PMV BLD AUTO: 9.9 FL (ref 10.8–14.1)
POTASSIUM SERPL-SCNC: 3.8 MMOL/L (ref 3.5–5.1)
PPD POC: NORMAL NEGATIVE
PROTHROMBIN TIME: 18.8 SEC (ref 9.6–12)
RBC # BLD AUTO: 2.07 M/UL (ref 4.23–5.67)
RBC MORPH BLD: ABNORMAL
SODIUM SERPL-SCNC: 140 MMOL/L (ref 136–145)
WBC # BLD AUTO: 8.9 K/UL (ref 4.3–11.1)
WBC MORPH BLD: ABNORMAL

## 2017-03-27 PROCEDURE — 74011250637 HC RX REV CODE- 250/637: Performed by: INTERNAL MEDICINE

## 2017-03-27 PROCEDURE — 36430 TRANSFUSION BLD/BLD COMPNT: CPT

## 2017-03-27 PROCEDURE — P9016 RBC LEUKOCYTES REDUCED: HCPCS | Performed by: INTERNAL MEDICINE

## 2017-03-27 PROCEDURE — 65270000029 HC RM PRIVATE

## 2017-03-27 PROCEDURE — 82746 ASSAY OF FOLIC ACID SERUM: CPT | Performed by: INTERNAL MEDICINE

## 2017-03-27 PROCEDURE — 77030019605

## 2017-03-27 PROCEDURE — 74011250636 HC RX REV CODE- 250/636

## 2017-03-27 PROCEDURE — 74011250636 HC RX REV CODE- 250/636: Performed by: INTERNAL MEDICINE

## 2017-03-27 PROCEDURE — 77030013131 HC IV BLD ST ICUM -A

## 2017-03-27 PROCEDURE — 80048 BASIC METABOLIC PNL TOTAL CA: CPT | Performed by: INTERNAL MEDICINE

## 2017-03-27 PROCEDURE — 36415 COLL VENOUS BLD VENIPUNCTURE: CPT | Performed by: INTERNAL MEDICINE

## 2017-03-27 PROCEDURE — 97530 THERAPEUTIC ACTIVITIES: CPT

## 2017-03-27 PROCEDURE — 77030011256 HC DRSG MEPILEX <16IN NO BORD MOLN -A

## 2017-03-27 PROCEDURE — 83540 ASSAY OF IRON: CPT | Performed by: INTERNAL MEDICINE

## 2017-03-27 PROCEDURE — 82962 GLUCOSE BLOOD TEST: CPT

## 2017-03-27 PROCEDURE — 85025 COMPLETE CBC W/AUTO DIFF WBC: CPT | Performed by: INTERNAL MEDICINE

## 2017-03-27 PROCEDURE — 82607 VITAMIN B-12: CPT | Performed by: INTERNAL MEDICINE

## 2017-03-27 PROCEDURE — 85610 PROTHROMBIN TIME: CPT | Performed by: INTERNAL MEDICINE

## 2017-03-27 PROCEDURE — 74011250637 HC RX REV CODE- 250/637: Performed by: RADIOLOGY

## 2017-03-27 PROCEDURE — 85730 THROMBOPLASTIN TIME PARTIAL: CPT | Performed by: INTERNAL MEDICINE

## 2017-03-27 PROCEDURE — 82728 ASSAY OF FERRITIN: CPT | Performed by: INTERNAL MEDICINE

## 2017-03-27 RX ORDER — SODIUM CHLORIDE 9 MG/ML
250 INJECTION, SOLUTION INTRAVENOUS AS NEEDED
Status: DISCONTINUED | OUTPATIENT
Start: 2017-03-27 | End: 2017-03-29

## 2017-03-27 RX ORDER — WARFARIN 2 MG/1
4 TABLET ORAL
Status: DISCONTINUED | OUTPATIENT
Start: 2017-03-27 | End: 2017-03-29

## 2017-03-27 RX ORDER — HEPARIN SODIUM 5000 [USP'U]/ML
5000 INJECTION, SOLUTION INTRAVENOUS; SUBCUTANEOUS ONCE
Status: COMPLETED | OUTPATIENT
Start: 2017-03-27 | End: 2017-03-27

## 2017-03-27 RX ORDER — FUROSEMIDE 10 MG/ML
40 INJECTION INTRAMUSCULAR; INTRAVENOUS ONCE
Status: COMPLETED | OUTPATIENT
Start: 2017-03-27 | End: 2017-03-27

## 2017-03-27 RX ORDER — DIPHENHYDRAMINE HYDROCHLORIDE 50 MG/ML
12.5 INJECTION, SOLUTION INTRAMUSCULAR; INTRAVENOUS
Status: COMPLETED | OUTPATIENT
Start: 2017-03-27 | End: 2017-03-27

## 2017-03-27 RX ADMIN — Medication 10 ML: at 21:32

## 2017-03-27 RX ADMIN — TAMSULOSIN HYDROCHLORIDE 0.4 MG: 0.4 CAPSULE ORAL at 10:04

## 2017-03-27 RX ADMIN — HEPARIN SODIUM AND DEXTROSE 30 UNITS/KG/HR: 5000; 5 INJECTION INTRAVENOUS at 03:00

## 2017-03-27 RX ADMIN — RDII 250 MG CAPSULE 250 MG: at 10:03

## 2017-03-27 RX ADMIN — WARFARIN SODIUM 4 MG: 2 TABLET ORAL at 21:28

## 2017-03-27 RX ADMIN — DIPHENHYDRAMINE HYDROCHLORIDE 12.5 MG: 50 INJECTION, SOLUTION INTRAMUSCULAR; INTRAVENOUS at 15:18

## 2017-03-27 RX ADMIN — INSULIN LISPRO 4 UNITS: 100 INJECTION, SOLUTION INTRAVENOUS; SUBCUTANEOUS at 21:30

## 2017-03-27 RX ADMIN — ACETAMINOPHEN 650 MG: 325 TABLET, FILM COATED ORAL at 01:29

## 2017-03-27 RX ADMIN — RDII 250 MG CAPSULE 250 MG: at 17:17

## 2017-03-27 RX ADMIN — CHOLESTYRAMINE 4 G: 4 POWDER, FOR SUSPENSION ORAL at 17:16

## 2017-03-27 RX ADMIN — HEPARIN SODIUM AND DEXTROSE 34 UNITS/KG/HR: 5000; 5 INJECTION INTRAVENOUS at 18:01

## 2017-03-27 RX ADMIN — CHOLESTYRAMINE 4 G: 4 POWDER, FOR SUSPENSION ORAL at 10:04

## 2017-03-27 RX ADMIN — HEPARIN SODIUM 5000 UNITS: 5000 INJECTION INTRAVENOUS; SUBCUTANEOUS at 00:00

## 2017-03-27 RX ADMIN — POLYETHYLENE GLYCOL 3350 17 G: 17 POWDER, FOR SOLUTION ORAL at 10:03

## 2017-03-27 RX ADMIN — FUROSEMIDE 40 MG: 10 INJECTION, SOLUTION INTRAMUSCULAR; INTRAVENOUS at 15:18

## 2017-03-27 RX ADMIN — HYDROCODONE BITARTRATE AND ACETAMINOPHEN 1 TABLET: 7.5; 325 TABLET ORAL at 21:34

## 2017-03-27 RX ADMIN — FAMOTIDINE 20 MG: 20 TABLET, FILM COATED ORAL at 10:03

## 2017-03-27 RX ADMIN — HEPARIN SODIUM 5000 UNITS: 5000 INJECTION, SOLUTION INTRAVENOUS; SUBCUTANEOUS at 18:37

## 2017-03-27 RX ADMIN — ACETAMINOPHEN 650 MG: 325 TABLET, FILM COATED ORAL at 15:17

## 2017-03-27 RX ADMIN — OLMESARTAN MEDOXOMIL 40 MG: 40 TABLET, FILM COATED ORAL at 10:03

## 2017-03-27 RX ADMIN — FAMOTIDINE 20 MG: 20 TABLET, FILM COATED ORAL at 17:17

## 2017-03-27 NOTE — PROGRESS NOTES
Progress Note    Patient: Austin Tadeo MRN: [id number]  SSN: [id number]    YOB: 1945  Age: 66 y.o. Sex: male      Admit Date: 3/21/2017    LOS: 6 days     Subjective:     67 yo male admitted for extensive LE DVT found via outpatient US. Pt had extended hospitalization this past month and was having leg pain during rehab. Pt was started on heparin infusion and IR was consulted. Complaining of forearm pain in areas of bruising. Had fever 15 minutes after transfusion started last night. No chest pain, dyspnea, N/V, or abd pain. Review of Systems:  Pertinent per HPI.     Medications:  Current Facility-Administered Medications   Medication Dose Route Frequency    warfarin (COUMADIN) tablet 4 mg  4 mg Oral QHS    furosemide (LASIX) injection 40 mg  40 mg IntraVENous ONCE    0.9% sodium chloride infusion 250 mL  250 mL IntraVENous PRN    acetaminophen (TYLENOL) tablet 650 mg  650 mg Oral Q6H PRN    famotidine (PEPCID) tablet 20 mg  20 mg Oral BID    polyethylene glycol (MIRALAX) packet 17 g  17 g Oral DAILY    Saccharomyces boulardii (FLORASTOR) capsule 250 mg  250 mg Oral BID    tamsulosin (FLOMAX) capsule 0.4 mg  0.4 mg Oral DAILY    cholestyramine light (QUESTRAN LITE) packet 4 g  4 g Oral BID WITH MEALS    olmesartan (BENICAR) tablet 40 mg  40 mg Oral DAILY    bisacodyl (DULCOLAX) suppository 10 mg  10 mg Rectal DAILY PRN    LORazepam (ATIVAN) tablet 1 mg  1 mg Oral BID PRN    heparin 25,000 units in dextrose 500 mL infusion  18-36 Units/kg/hr (Adjusted) IntraVENous TITRATE    zolpidem (AMBIEN) tablet 5 mg  5 mg Oral QHS PRN    ondansetron (ZOFRAN) injection 4 mg  4 mg IntraVENous Q6H PRN    HYDROcodone-acetaminophen (NORCO) 7.5-325 mg per tablet 1 Tab  1 Tab Oral Q6H PRN    morphine injection 4 mg  4 mg IntraVENous Q4H PRN    hydrALAZINE (APRESOLINE) 20 mg/mL injection 20 mg  20 mg IntraVENous Q6H PRN    sodium chloride (NS) flush 5-10 mL  5-10 mL IntraVENous Q8H    sodium chloride (NS) flush 5-10 mL  5-10 mL IntraVENous PRN    insulin lispro (HUMALOG) injection   SubCUTAneous AC&HS       Objective:     Vitals:    03/27/17 0147 03/27/17 0519 03/27/17 0718 03/27/17 1230   BP:  112/52 136/50 123/53   Pulse:  (!) 103 100 99   Resp:  18 18 15   Temp: 98.9 °F (37.2 °C) 99.8 °F (37.7 °C) 99.1 °F (37.3 °C) 98.7 °F (37.1 °C)   SpO2:  94% 93% 96%   Weight:       Height:            Physical Exam:   General: awake, alert, no apparent distress  Lungs: Clear to auscultation bilaterally. Heart: Regular rate and rhythm. Abdomen: Soft, nontender, nondistended. Bowel sounds normal.  Extremities:  Bilateral 2-3+ LE edema. Mild UE edema bilaterally. Skin: brusing in dependent areas of forearms bilaterally, tender to touch. Psych: AOx3. Normal mood and affect.     Lab/Data Review:  Recent Results (from the past 24 hour(s))   TYPE & CROSSMATCH    Collection Time: 03/26/17  4:15 PM   Result Value Ref Range    Crossmatch Expiration 03/29/2017     ABO/Rh(D) O NEGATIVE     Antibody screen NEG     Unit number [id number]     Blood component type  LR AS5     Unit division 00     Status of unit TRANSFUSED     Crossmatch result Compatible    PTT    Collection Time: 03/26/17  4:15 PM   Result Value Ref Range    aPTT 59.5 (H) 23.5 - 31.7 SEC   GLUCOSE, POC    Collection Time: 03/26/17  4:42 PM   Result Value Ref Range    Glucose (POC) 132 (H) 65 - 100 mg/dL   TRANSFUSION REACTION    Collection Time: 03/26/17  8:05 PM   Result Value Ref Range    Unit Number [id number]     Clerical Errors None detected     Pre-txfusion hemolysis: NONE     Post-txfusion hemolysis: NONE     Blood bag hemolysis: NONE     Direct Chan,pre-txfusion Not required     Direct Chan,post-txfusion NEG     PATHOLOGIST INTERP: PENDING     Component Type  LR AS5     Blood type,post-txn O  NEG      GLUCOSE, POC    Collection Time: 03/26/17  8:27 PM   Result Value Ref Range    Glucose (POC) 142 (H) 65 - 100 mg/dL   PTT Collection Time: 03/26/17 10:16 PM   Result Value Ref Range    aPTT 37.8 (H) 23.5 - 31.7 SEC   PROTHROMBIN TIME + INR    Collection Time: 03/27/17  6:00 AM   Result Value Ref Range    Prothrombin time 18.8 (H) 9.6 - 12.0 sec    INR 1.7 (H) 0.9 - 1.2     PTT    Collection Time: 03/27/17  6:00 AM   Result Value Ref Range    aPTT 77.6 (H) 23.5 - 31.7 SEC   GLUCOSE, POC    Collection Time: 03/27/17  7:24 AM   Result Value Ref Range    Glucose (POC) 126 (H) 65 - 100 mg/dL   CBC WITH AUTOMATED DIFF    Collection Time: 03/27/17  8:10 AM   Result Value Ref Range    WBC 8.9 4.3 - 11.1 K/uL    RBC 2.07 (L) 4.23 - 5.67 M/uL    HGB 6.3 (LL) 13.6 - 17.2 g/dL    HCT 20.1 (LL) 41.1 - 50.3 %    MCV 97.1 79.6 - 97.8 FL    MCH 30.4 26.1 - 32.9 PG    MCHC 31.3 (L) 31.4 - 35.0 g/dL    RDW 18.6 (H) 11.9 - 14.6 %    PLATELET 686 959 - 180 K/uL    MPV 9.9 (L) 10.8 - 14.1 FL    DF PENDING    GLUCOSE, POC    Collection Time: 03/27/17 12:35 PM   Result Value Ref Range    Glucose (POC) 126 (H) 65 - 100 mg/dL     I have reviewed new clinical data. Assessment:     Principal Problem:    DVT, lower extremity, proximal, acute ([facility] (3/21/2017)    Active Problems:    Type 2 diabetes mellitus without complication ([facility] (7/83/0548)      Benign non-nodular prostatic hyperplasia without lower urinary tract symptoms (7/22/2016)      Hypertriglyceridemia (7/22/2016)      Depression (7/22/2016)      Obstructive sleep apnea syndrome (7/22/2016)      Overview: Home CPAP      Morbid obesity due to excess calories ([facility] (7/22/2016)      Primary insomnia (7/22/2016)      Benign essential HTN (9/13/2016)      Plan:     Heparin infusion  Warfarin per pharmacy dosing  Daily INR, up to 1.7  Give Lasix again today  Transfuse 2 units PRBC. Had febrile transfusion reaction yesterday. Will ask hematology to evaluate. PT  Asked RN to help patient reposition his arms periodically to prevent pooling in dependent areas of his forearms.     DVT prophylaxis: heparin  Disposition: will need SNF    Signed By: Sharmila Isbell DO     March 27, 2017

## 2017-03-27 NOTE — PROGRESS NOTES
Problem: Mobility Impaired (Adult and Pediatric)  Goal: *Acute Goals and Plan of Care (Insert Text)  STG:  (1.)Mr. Socorro Lester will move from supine to sit and sit to supine , scoot up and down and roll side to side with MINIMAL ASSIST within 5 day(s). (2.)Mr. Socorro Letser will transfer from bed to chair and chair to bed with MODERATE ASSIST using the least restrictive device within 5 day(s). (3.)Mr. Socorro Lester will ambulate with MODERATE ASSIST for 5 feet with the least restrictive device within 5 day(s). (4.)Mr. Socorro Lester will tolerate 25 minutes of therapeutic activity/exercise within 5 days in order to Improve activity tolerance for mobility. (5.)Mr. Socorro Lester will perform LE exercises with 1 to 2 cues for form within 3 days to improve strength for functional transfers and ambulation. LTG:  (1.)Mr. Socorro Lester will move from supine to sit and sit to supine , scoot up and down and roll side to side in bed with CONTACT GUARD ASSIST within 10 day(s). (2.)Mr. Socorro Lester will transfer from bed to chair and chair to bed with MINIMAL ASSIST using the least restrictive device within 10 day(s). (3.)Mr. Socorro Lester will ambulate with MINIMAL ASSIST for 25 feet with the least restrictive device within 10 day(s). Goals to be updated as patient progresses. ________________________________________________________________________________________________      PHYSICAL THERAPY: Daily Note, Treatment Day: 1st and AM 3/27/2017  INPATIENT: Hospital Day: 7  [place] / [place] / Product Type: Medicare /      NAME/AGE/GENDER: Luiza Crawley is a 66 y.o. male            PRIMARY DIAGNOSIS: DVT, lower extremity, proximal, acute, bilateral (HCC) DVT, lower extremity, proximal, acute ([address] DVT, lower extremity, proximal, acute ([facility]        ICD-10: Treatment Diagnosis:       · Difficulty in walking, Not elsewhere classified (R26.2)   Precaution/Allergies:  Sulfite       ASSESSMENT:      Mr. Socorro Lester is a 66 y.o. male admitted with extensive DVTs of the LEs. He is supine upon arrival with CNA present. Agreeable to PT treatment. C/O 7/10 pain in BLE, especially calves. Able to perform supine LLE AAROM exercises; unable to tolerate on RLE. Requires MAX A x2 and increased time for all bed mobility. Required frequent rest breaks d/t fatigue and required MAX verbal cueing for technique and efficiency. Unable to achieve full upright posture to sit EOB because of pain in B calves. His bed mobility is limited by decreased use of BUE to perform supine>sit transfer. Returned to supine with MOD A x2 and positioned comfortably with BUE elevated; needs in reach. Limited progress towards goals today. Explained purpose of PT and tried to encourage pt. This section established at most recent assessment   PROBLEM LIST (Impairments causing functional limitations):  1. Decreased Strength  2. Decreased ADL/Functional Activities  3. Decreased Transfer Abilities  4. Decreased Ambulation Ability/Technique  5. Decreased Balance  6. Increased Pain  7. Decreased Knowledge of Precautions  8. Decreased [place] with Home Exercise Program    INTERVENTIONS PLANNED: (Benefits and precautions of physical therapy have been discussed with the patient.)  1. Balance Exercise  2. Bed Mobility  3. Family Education  4. Gait Training  5. Home Exercise Program (HEP)  6. Therapeutic Activites  7. Therapeutic Exercise/Strengthening  8. Transfer Training  9. Patient Education  10. Group Therapy      TREATMENT PLAN: Frequency/Duration: 3 times a week for duration of hospital stay  Rehabilitation Potential For Stated Goals: FAIR      RECOMMENDED REHABILITATION/EQUIPMENT: (at time of discharge pending progress): Continue Skilled Therapy and Rehab.                    HISTORY:   History of Present Injury/Illness (Reason for Referral):  Per MD Note: \"Eliud Morton is a 66 y.o. male that presented to the ED with 2 week history of worsening swelling of the bilateral lower extremities with increasing difficulty with ambulation. He had OP US today showing extensive DVT. He is currently undergoing rehab at [facility] following admission at [facility] for JIM requiring 2 runs of HD. He has history of DVT in 2013 and had IVC filter placed at that time. Patient denies dyspnea or chest pain. The hospitalist have been asked to admit. \"  Past Medical History/Comorbidities:   Mr. Larry Kumar  has a past medical history of Calculus of kidney; Diabetes ([address] and DVT (deep venous thrombosis) ([facility] (2013). Mr. Larry Kumar  has a past surgical history that includes urological.  Social History/Living Environment:   Home Environment: Skilled nursing facility  One/Two Story Residence: One story  Living Alone: No  Support Systems: Spouse/Significant Other/Partner  Patient Expects to be Discharged to[de-identified] Private residence  Current DME Used/Available at Home: None  Prior Level of Function/Work/Activity:  Patient ambulatory prior to initial hospitalization. Unsure of current \"baseline\" at rehab. Number of Personal Factors/Comorbidities that affect the Plan of Care: 1-2: MODERATE COMPLEXITY   EXAMINATION:   Most Recent Physical Functioning:   Gross Assessment:                  Posture:     Balance:  Sitting:  (Unable) Bed Mobility:  Rolling: Maximum assistance  Supine to Sit: Maximum assistance;Assist x2  Sit to Supine: Maximum assistance;Assist x2  Scooting: Moderate assistance; Additional time  Level of Assistance: Maximum assistance  Interventions: Safety awareness training;Manual cues; Verbal cues; Tactile cues;Demonstration  Duration: 38 Minutes  Wheelchair Mobility:     Transfers:     Gait:             Body Structures Involved:  1. Heart  2. Lungs  3. Muscles Body Functions Affected:  1. Sensory/Pain  2. Hematological  3. Neuromusculoskeletal  4. Movement Related Activities and Participation Affected:  1. Mobility  2. Self Care  3. Domestic Life  4. Interpersonal Interactions and Relationships  5.  Community, Social and [employer] [place]   Number of elements that affect the Plan of Care: 4+: HIGH COMPLEXITY   CLINICAL PRESENTATION:   Presentation: Evolving clinical presentation with changing clinical characteristics: MODERATE COMPLEXITY   CLINICAL DECISION MAKIN [facility] Short Form  How much difficulty does the patient currently have. .. Unable A Lot A Little None   1. Turning over in bed (including adjusting bedclothes, sheets and blankets)? [ ] 1   [X] 2   [ ] 3   [ ] 4   2. Sitting down on and standing up from a chair with arms ( e.g., wheelchair, bedside commode, etc.)   [X] 1   [ ] 2   [ ] 3   [ ] 4   3. Moving from lying on back to sitting on the side of the bed? [ ] 1   [X] 2   [ ] 3   [ ] 4   How much help from another person does the patient currently need. .. Total A Lot A Little None   4. Moving to and from a bed to a chair (including a wheelchair)? [X] 1   [ ] 2   [ ] 3   [ ] 4   5. Need to walk in hospital room? [X] 1   [ ] 2   [ ] 3   [ ] 4   6. Climbing 3-5 steps with a railing? [X] 1   [ ] 2   [ ] 3   [ ] 4   © , Trustees of [address] [place] [address] [address] [address], under license to [employer]. All rights reserved    Score:  Initial: 8 Most Recent: X (Date: -- )     Interpretation of Tool:  Represents activities that are increasingly more difficult (i.e. Bed mobility, Transfers, Gait).        Score 24 23 22-20 19-15 14-10 9-7 6       Modifier CH CI CJ CK CL CM CN         · Mobility - Walking and Moving Around:               - CURRENT STATUS:    CM - 80%-99% impaired, limited or restricted               - GOAL STATUS:           CL - 60%-79% impaired, limited or restricted               - D/C STATUS:                       ---------------To be determined---------------  [place] / [place] / Product Type: Medicare /       Medical Necessity:     · Patient demonstrates good rehab potential due to higher previous functional level. Reason for Services/Other Comments:  · Patient continues to require modification of therapeutic interventions to increase complexity of exercises. Use of outcome tool(s) and clinical judgement create a POC that gives a: Questionable prediction of patient's progress: MODERATE COMPLEXITY                 TREATMENT:   (In addition to Assessment/Re-Assessment sessions the following treatments were rendered)   Pre-treatment Symptoms/Complaints: \"It hurts too much, I can't do it\"  Pain: Initial:   Pain Intensity 1: 7  Pain Location 1: Leg  Pain Orientation 1: Right, Left  Pain Intervention(s) 1: Exercise, Repositioned, Nurse notified  Post Session:  No increased pain once at rest (increased pain with movement of LEs)      Therapeutic Activity: (38 Minutes   ):  Therapeutic activities including Bed transfers, bed mobility, and BLE AAROM to improve mobility, strength and endurance. Required maximal   assistance and verbal/manual cues to promote coordination of bilateral, lower extremity(s). Date:  3/27/17 Date:   Date:     Activity/Exercise Parameters Parameters Parameters   AAROM L heel slides 1x8     AAROM L hip abduction 1x8     Ankle pumps 1x10                                    Braces/Orthotics/Lines/Etc:   · IV  · magana catheter  · O2 Device: Room air  Treatment/Session Assessment:    · Response to Treatment:  Limited by pain  · Interdisciplinary Collaboration:  · Physical Therapist, Registered Nurse and Certified Nursing Assistant/Patient Care Technician  · After treatment position/precautions:  · Supine in bed, Bed/Chair-wheels locked, Bed in low position and Call light within reach  · Compliance with Program/Exercises: Will assess as treatment progresses. · Recommendations/Intent for next treatment session: \"Next visit will focus on advancements to more challenging activities and reduction in assistance provided\".   Total Treatment Duration:  PT Patient Time In/Time Out  Time In: 1118  Time Out: [facility] Daisha Dubose

## 2017-03-27 NOTE — PROGRESS NOTES
Warfarin dosing per pharmacist    Jennifer Peraza is a 66 y.o. male. Height: 5' 6\" (167.6 cm)    Weight: 126.2 kg (278 lb 4.8 oz)    Indication:  Bilateral LE DVT, thrombosed IVC    Goal INR:  2-3    Home dose:  New start    Risk factors or significant drug interactions: Other anticoagulants:  Heparin bridge    Daily Monitoring  Date  INR     Warfarin dose HGB              Notes  3/24  1.8  5 mg  9.4  3/25  1.1  5 mg   ---  3/26  1.2  5 mg  7.1  3/27  1.7  4 mg  ---    Pharmacy consulted to dose pt coumadin on 3/24. Pt s/p EKOS and now starting coumadin bridged with heparin. INR trending up a little quickly since yesterday, up to 1.7 today from 1.2 yesterday. As INR rising somewhat quickly since yesterday, will give lower dose of 4 mg tonight. Will schedule warfarin dose at 2200 tonight to minimize possible interaction with Questran being given with dinner at 1700. Will follow INR daily moving forward.     Thank you,  Milana Lucero, PharmD  Clinical Pharmacist  [phone]

## 2017-03-28 ENCOUNTER — APPOINTMENT (OUTPATIENT)
Dept: GENERAL RADIOLOGY | Age: 72
DRG: 271 | End: 2017-03-28
Attending: NURSE PRACTITIONER
Payer: MEDICARE

## 2017-03-28 LAB
ABO + RH BLD: NORMAL
APTT PPP: 89.9 SEC (ref 23.5–31.7)
APTT PPP: >110 SEC (ref 23.5–31.7)
APTT PPP: >110 SEC (ref 23.5–31.7)
BLD PROD TYP BPU: NORMAL
BLOOD GROUP ANTIBODIES SERPL: NORMAL
BPU ID: NORMAL
CROSSMATCH RESULT,%XM: NORMAL
FERRITIN SERPL-MCNC: 464 NG/ML (ref 8–388)
FOLATE SERPL-MCNC: 6.9 NG/ML (ref 3.1–17.5)
GLUCOSE BLD STRIP.AUTO-MCNC: 112 MG/DL (ref 65–100)
GLUCOSE BLD STRIP.AUTO-MCNC: 116 MG/DL (ref 65–100)
GLUCOSE BLD STRIP.AUTO-MCNC: 121 MG/DL (ref 65–100)
GLUCOSE BLD STRIP.AUTO-MCNC: 125 MG/DL (ref 65–100)
HGB BLD-MCNC: 9.1 G/DL (ref 13.6–17.2)
HGB RETIC QN AUTO: 28 PG (ref 29–35)
IMM RETICS NFR: 29 % (ref 2.3–13.4)
INR PPP: 2 (ref 0.9–1.2)
IRON SATN MFR SERPL: 13 %
IRON SERPL-MCNC: 21 UG/DL (ref 35–150)
PROTHROMBIN TIME: 21.4 SEC (ref 9.6–12)
RETICS # AUTO: 0.19 M/UL (ref 0.03–0.1)
RETICS/RBC NFR AUTO: 6.2 % (ref 0.3–2)
SPECIMEN EXP DATE BLD: NORMAL
STATUS OF UNIT,%ST: NORMAL
TIBC SERPL-MCNC: 166 UG/DL (ref 250–450)
UNIT DIVISION, %UDIV: 0
VIT B12 SERPL-MCNC: 514 PG/ML (ref 193–986)

## 2017-03-28 PROCEDURE — 85730 THROMBOPLASTIN TIME PARTIAL: CPT | Performed by: INTERNAL MEDICINE

## 2017-03-28 PROCEDURE — 82962 GLUCOSE BLOOD TEST: CPT

## 2017-03-28 PROCEDURE — 73090 X-RAY EXAM OF FOREARM: CPT

## 2017-03-28 PROCEDURE — 36415 COLL VENOUS BLD VENIPUNCTURE: CPT | Performed by: INTERNAL MEDICINE

## 2017-03-28 PROCEDURE — 85046 RETICYTE/HGB CONCENTRATE: CPT | Performed by: NURSE PRACTITIONER

## 2017-03-28 PROCEDURE — 65270000029 HC RM PRIVATE

## 2017-03-28 PROCEDURE — 85018 HEMOGLOBIN: CPT | Performed by: INTERNAL MEDICINE

## 2017-03-28 PROCEDURE — 74011250636 HC RX REV CODE- 250/636: Performed by: FAMILY MEDICINE

## 2017-03-28 PROCEDURE — 74011250637 HC RX REV CODE- 250/637: Performed by: RADIOLOGY

## 2017-03-28 PROCEDURE — 74011250637 HC RX REV CODE- 250/637: Performed by: INTERNAL MEDICINE

## 2017-03-28 PROCEDURE — 99221 1ST HOSP IP/OBS SF/LOW 40: CPT | Performed by: PHYSICAL MEDICINE & REHABILITATION

## 2017-03-28 PROCEDURE — 85610 PROTHROMBIN TIME: CPT | Performed by: INTERNAL MEDICINE

## 2017-03-28 PROCEDURE — 74011250636 HC RX REV CODE- 250/636: Performed by: INTERNAL MEDICINE

## 2017-03-28 RX ORDER — CITALOPRAM 20 MG/1
20 TABLET, FILM COATED ORAL DAILY
Status: DISCONTINUED | OUTPATIENT
Start: 2017-03-29 | End: 2017-04-04 | Stop reason: HOSPADM

## 2017-03-28 RX ADMIN — Medication 10 ML: at 22:00

## 2017-03-28 RX ADMIN — Medication 5 ML: at 14:00

## 2017-03-28 RX ADMIN — FAMOTIDINE 20 MG: 20 TABLET, FILM COATED ORAL at 08:20

## 2017-03-28 RX ADMIN — HEPARIN SODIUM AND DEXTROSE 29 UNITS/KG/HR: 5000; 5 INJECTION INTRAVENOUS at 16:15

## 2017-03-28 RX ADMIN — RDII 250 MG CAPSULE 250 MG: at 08:21

## 2017-03-28 RX ADMIN — Medication 4 MG: at 04:21

## 2017-03-28 RX ADMIN — OLMESARTAN MEDOXOMIL 40 MG: 40 TABLET, FILM COATED ORAL at 08:20

## 2017-03-28 RX ADMIN — Medication 5 ML: at 10:22

## 2017-03-28 RX ADMIN — HYDROCODONE BITARTRATE AND ACETAMINOPHEN 1 TABLET: 7.5; 325 TABLET ORAL at 11:40

## 2017-03-28 RX ADMIN — TAMSULOSIN HYDROCHLORIDE 0.4 MG: 0.4 CAPSULE ORAL at 08:21

## 2017-03-28 RX ADMIN — HEPARIN SODIUM AND DEXTROSE 32 UNITS/KG/HR: 5000; 5 INJECTION INTRAVENOUS at 03:02

## 2017-03-28 RX ADMIN — FAMOTIDINE 20 MG: 20 TABLET, FILM COATED ORAL at 17:17

## 2017-03-28 RX ADMIN — Medication 4 MG: at 20:36

## 2017-03-28 RX ADMIN — Medication 4 MG: at 10:21

## 2017-03-28 RX ADMIN — CHOLESTYRAMINE 4 G: 4 POWDER, FOR SUSPENSION ORAL at 08:20

## 2017-03-28 RX ADMIN — HYDROCODONE BITARTRATE AND ACETAMINOPHEN 1 TABLET: 7.5; 325 TABLET ORAL at 17:17

## 2017-03-28 RX ADMIN — POLYETHYLENE GLYCOL 3350 17 G: 17 POWDER, FOR SOLUTION ORAL at 08:20

## 2017-03-28 RX ADMIN — ACETAMINOPHEN 325 MG: 325 TABLET, FILM COATED ORAL at 11:40

## 2017-03-28 RX ADMIN — WARFARIN SODIUM 4 MG: 2 TABLET ORAL at 21:02

## 2017-03-28 RX ADMIN — RDII 250 MG CAPSULE 250 MG: at 17:17

## 2017-03-28 NOTE — PROGRESS NOTES
Pt C/O left forearm pain. Requested morphine. This was given. arm was placed up on pillows, area warm to touch. I.V. Site changed from left arm to right. Will cont to monitor.

## 2017-03-28 NOTE — CONSULTS
PM&R Rehab Consult    Subjective:     Date of Consultation:  March 28, 2017    Referring Physician: Dr. Delvis Corrales    Patient is a 66 y.o. male who is being seen for rehab recommendations and assessment of rehab potential.    Principal Problem:    DVT, lower extremity, proximal, acute ([facility] (3/21/2017)    Active Problems:    Type 2 diabetes mellitus without complication ([address] (4/75/8380)      Benign non-nodular prostatic hyperplasia without lower urinary tract symptoms (7/22/2016)      Hypertriglyceridemia (7/22/2016)      Depression (7/22/2016)      Obstructive sleep apnea syndrome (7/22/2016)      Overview: Home CPAP      Morbid obesity due to excess calories ([facility] [address] (7/22/2016)      Primary insomnia (7/22/2016)      Benign essential HTN (9/13/2016)    History of Present Illness: Freedom Whitehead is a 66 y.o. male with a hx of htn, depression, DM2, and ELIDIA as well as obesity who was admitted to [facility] after ht began experiencing bilateral LE swelling 2 weeks PTA while in rehab at [facility]. He was there due to recent admission in February to [facility] after falling off a stool and sustaining \"a few\" spinal compression fxs.  He was found to be dehydrated, in renal failure requiring a couple of hemodialysis sessions. He presented to his PCP 3/20 and an US was performed at [facility] which revealed extensive bilateral LE DVT extending in to the iliacs. He c/o LE heaviness, like carrying around 2 cement blocks. Right leg most symptomatic. He was previously very active. Following that admission he was discharged to rehab. He reports that he has been non ambulatory since his admission to [facility]. He says he is limited by severe back pain. His spinal compression fractures were treated non operatively, per pt. Hx DVT 5 or 6 years ago-he does not recall which leg. At that time an IVC filter was placed and he states he was not discharged on anticoagulation. No bleeding history and sts he is up to date on cancer screenings. Once admitted to [facility], he received direct TPA by IR and was placed on Heparin bridge with Coumadin. His hbg dropped to 6.3, whereby requiring a blood transfusion. He did have a fever within a few hrs of starting the transfusion. Hematology was consulted to r/o a clotting disorder. He remains MAX assist with all care. He is very emotional about the series of events. His therapy, thus far has only involved PT on 2 occasions since admission a week ago. PTA, he was non amb due to severe back pain  Past Medical History:   Diagnosis Date    Calculus of kidney     Diabetes ([facility] [address]     DVT (deep venous thrombosis) ([facility] [address] 2013    s/p IVC filter       Family History   Problem Relation Age of Onset    Cancer Brother      lung cancer    Deep Vein Thrombosis Other      father    Heart Disease Other      mother      Social History   Substance Use Topics    Smoking status: Former Smoker    Smokeless tobacco: Not on file    Alcohol use 0.0 oz/week     0 Standard drinks or equivalent per week     Past Surgical History:   Procedure Laterality Date    HX UROLOGICAL        Prior to Admission medications    Medication Sig Start Date End Date Taking? Authorizing Provider   colestipol (COLESTID) 1 gram tablet TAKE 2 TABLETS (2 G) BY MOUTH 2 (TWO) TIMES A DAY. 2/2/17  Yes Historical Provider   polyethylene glycol (MIRALAX) 17 gram packet Take 17 g by mouth daily. Yes Yvette Shah MD   famotidine (PEPCID) 20 mg tablet Take 20 mg by mouth two (2) times a day. Yes Yvette Shah MD   Saccharomyces boulardii (FLORASTOR) 250 mg capsule Take 250 mg by mouth two (2) times a day. Yes Yvette Shah MD   tamsulosin (FLOMAX) 0.4 mg capsule Take 0.4 mg by mouth daily. Yes Yvette Shah MD   cyclobenzaprine (FLEXERIL) 5 mg tablet Take 5 mg by mouth three (3) times daily as needed for Muscle Spasm(s). Yes Yvette Shah MD   HYDROcodone-acetaminophen (NORCO) 5-325 mg per tablet Take 2 Tabs by mouth every four (4) hours as needed for Pain.    Yes Yvette Shah MD   metFORMIN (GLUCOPHAGE) 500 mg tablet Take 1 Tab by mouth two (2) times daily (with meals). 17  Yes Lucas Phillips MD   fenofibrate nanocrystallized (TRICOR) 145 mg tablet Take 1 Tab by mouth daily. Patient taking differently: Take 145 mg by mouth nightly. 17  Yes Lucas Phillips MD   glucose blood VI test strips (ACCU-CHEK [place]ENIX PLUS TEST STRP) strip Check BS Once Daily  Dx E11.9 17   Lucas Phillips MD   potassium citrate (UROCIT-K10) 10 mEq (1,080 mg) TbER Take 1 Tab by mouth two (2) times a day. 17   Lucas Phillips MD   olmesartan (BENICAR) 40 mg tablet Take 1 Tab by mouth daily. 17   Lucas Phillips MD   Lancets (ACCU-CHEK FASTCLIX) misc Check BS Once Daily  DX E11.9 17   Lucas Phillips MD   zolpidem (AMBIEN) 5 mg tablet Take 1 Tab by mouth nightly as needed for Sleep. Max Daily Amount: 5 mg. 16   Lucas Phillips MD     Allergies   Allergen Reactions    Sulfite Hives        Review of Systems:  A comprehensive review of systems was negative except for that written in the HPI. Objective:     Vitals:  Blood pressure 116/57, pulse 93, temperature 99.2 °F (37.3 °C), resp. rate 16, height 5' 6\" (1.676 m), weight 278 lb 4.8 oz (126.2 kg), SpO2 92 %. Temp (24hrs), Av.4 °F (37.4 °C), Min:98.5 °F (36.9 °C), Max:100.5 °F (38.1 °C)      Intake and Output:   1901 -  0700  In: 7639 [P.O.:240; I.V.:2259]  Out: 3360 [Urine:3360]    Physical Exam:   General: awake, alert, no apparent distress  Lungs: Clear to auscultation bilaterally. Heart: Regular rate and rhythm. Abdomen: Soft, nontender, nondistended. Bowel sounds normal.  Extremities: Bilateral 2-3+ LE edema. Mild UE edema bilaterally. Skin: brusing in dependent areas of forearms bilaterally, tender to touch. Psych: AOx3. Emotionally labile. Tearful at times.  Atten/concentration is poor due to being easily distracted and in anticipation of pain during exam  MSK ; he cannot raise his legs off the bed. DF and PF 4-, effort dependent. Does not tolerate PROM of legs due to pain.  Sensation intact    Labs/Studies:  Recent Results (from the past 72 hour(s))   GLUCOSE, POC    Collection Time: 03/25/17  8:34 PM   Result Value Ref Range    Glucose (POC) 147 (H) 65 - 100 mg/dL   PTT    Collection Time: 03/25/17  9:45 PM   Result Value Ref Range    aPTT 58.3 (H) 23.5 - 31.7 SEC   CBC W/O DIFF    Collection Time: 03/26/17  4:11 AM   Result Value Ref Range    WBC 8.1 4.3 - 11.1 K/uL    RBC 2.20 (L) 4.23 - 5.67 M/uL    HGB 6.7 (LL) 13.6 - 17.2 g/dL    HCT 20.8 (LL) 41.1 - 50.3 %    MCV 94.5 79.6 - 97.8 FL    MCH 30.5 26.1 - 32.9 PG    MCHC 32.2 31.4 - 35.0 g/dL    RDW 18.3 (H) 11.9 - 14.6 %    PLATELET 089 818 - 187 K/uL    MPV 9.4 (L) 10.8 - 58.4 FL   METABOLIC PANEL, BASIC    Collection Time: 03/26/17  4:11 AM   Result Value Ref Range    Sodium 142 136 - 145 mmol/L    Potassium 3.8 3.5 - 5.1 mmol/L    Chloride 105 98 - 107 mmol/L    CO2 26 21 - 32 mmol/L    Anion gap 11 7 - 16 mmol/L    Glucose 121 (H) 65 - 100 mg/dL    BUN 12 8 - 23 MG/DL    Creatinine 1.02 0.8 - 1.5 MG/DL    GFR est AA >60 >60 ml/min/1.73m2    GFR est non-AA >60 >60 ml/min/1.73m2    Calcium 7.9 (L) 8.3 - 10.4 MG/DL   PTT    Collection Time: 03/26/17  4:11 AM   Result Value Ref Range    aPTT 42.7 (H) 23.5 - 31.7 SEC   PROTHROMBIN TIME + INR    Collection Time: 03/26/17  4:11 AM   Result Value Ref Range    Prothrombin time 12.7 (H) 9.6 - 12.0 sec    INR 1.2 0.9 - 1.2     HGB & HCT    Collection Time: 03/26/17  8:49 AM   Result Value Ref Range    HGB 7.1 (L) 13.6 - 17.2 g/dL    HCT 22.8 (L) 41.1 - 50.3 %   PTT    Collection Time: 03/26/17 11:35 AM   Result Value Ref Range    aPTT 45.5 (H) 23.5 - 31.7 SEC   LD    Collection Time: 03/26/17 11:35 AM   Result Value Ref Range     (H) 110 - 210 U/L   HAPTOGLOBIN    Collection Time: 03/26/17 11:35 AM   Result Value Ref Range Haptoglobin 128 30 - 200 mg/dL   FIBRINOGEN    Collection Time: 03/26/17 11:35 AM   Result Value Ref Range    Fibrinogen 359 172 - 437 mg/dL   GLUCOSE, POC    Collection Time: 03/26/17 12:12 PM   Result Value Ref Range    Glucose (POC) 158 (H) 65 - 100 mg/dL   TYPE & CROSSMATCH    Collection Time: 03/26/17  4:15 PM   Result Value Ref Range    Crossmatch Expiration 03/29/2017     ABO/Rh(D) O NEGATIVE     Antibody screen NEG     Unit number [id number]     Blood component type RC LR AS5     Unit division 00     Status of unit TRANSFUSED     Crossmatch result Compatible     Unit number [id number]     Blood component type RC LR AS5     Unit division 00     Status of unit TRANSFUSED     Crossmatch result Compatible     Unit number [id number]     Blood component type RC LR AS5     Unit division 00     Status of unit TRANSFUSED     Crossmatch result Compatible    PTT    Collection Time: 03/26/17  4:15 PM   Result Value Ref Range    aPTT 59.5 (H) 23.5 - 31.7 SEC   GLUCOSE, POC    Collection Time: 03/26/17  4:42 PM   Result Value Ref Range    Glucose (POC) 132 (H) 65 - 100 mg/dL   TRANSFUSION REACTION    Collection Time: 03/26/17  8:05 PM   Result Value Ref Range    Unit Number [id number]     Clerical Errors None detected     Pre-txfusion hemolysis: NONE     Post-txfusion hemolysis: NONE     Blood bag hemolysis: NONE     Direct Chan,pre-txfusion Not required     Direct Chan,post-txfusion NEG     PATHOLOGIST INTERP: PENDING     Component Type RC LR AS5     Blood type,post-txn O  NEG      GLUCOSE, POC    Collection Time: 03/26/17  8:27 PM   Result Value Ref Range    Glucose (POC) 142 (H) 65 - 100 mg/dL   PTT    Collection Time: 03/26/17 10:16 PM   Result Value Ref Range    aPTT 37.8 (H) 23.5 - 31.7 SEC   PROTHROMBIN TIME + INR    Collection Time: 03/27/17  6:00 AM   Result Value Ref Range    Prothrombin time 18.8 (H) 9.6 - 12.0 sec    INR 1.7 (H) 0.9 - 1.2     PTT    Collection Time: 03/27/17  6:00 AM   Result Value Ref Range    aPTT 77.6 (H) 23.5 - 31.7 SEC   GLUCOSE, POC    Collection Time: 03/27/17  7:24 AM   Result Value Ref Range    Glucose (POC) 126 (H) 65 - 100 mg/dL   CBC WITH AUTOMATED DIFF    Collection Time: 03/27/17  8:10 AM   Result Value Ref Range    WBC 8.9 4.3 - 11.1 K/uL    RBC 2.07 (L) 4.23 - 5.67 M/uL    HGB 6.3 (LL) 13.6 - 17.2 g/dL    HCT 20.1 (LL) 41.1 - 50.3 %    MCV 97.1 79.6 - 97.8 FL    MCH 30.4 26.1 - 32.9 PG    MCHC 31.3 (L) 31.4 - 35.0 g/dL    RDW 18.6 (H) 11.9 - 14.6 %    PLATELET 876 876 - 119 K/uL    MPV 9.9 (L) 10.8 - 14.1 FL    NEUTROPHILS 68 43 - 78 %    LYMPHOCYTES 20 13 - 44 %    MONOCYTES 10 4.0 - 12.0 %    EOSINOPHILS 2 0.5 - 7.8 %    BASOPHILS 0 0.0 - 2.0 %    ABS. NEUTROPHILS 6.1 1.7 - 8.2 K/UL    ABS. LYMPHOCYTES 1.8 0.5 - 4.6 K/UL    ABS. MONOCYTES 0.9 0.1 - 1.3 K/UL    ABS. EOSINOPHILS 0.2 0.0 - 0.8 K/UL    ABS. BASOPHILS 0.0 0.0 - 0.2 K/UL    ABS. IMM.  GRANS. 0.1 0.0 - 0.5 K/UL    RBC COMMENTS SLIGHT  ANISOCYTOSIS        WBC COMMENTS Result Confirmed By Smear      PLATELET COMMENTS ADEQUATE      DF MANUAL     METABOLIC PANEL, BASIC    Collection Time: 03/27/17  8:10 AM   Result Value Ref Range    Sodium 140 136 - 145 mmol/L    Potassium 3.8 3.5 - 5.1 mmol/L    Chloride 105 98 - 107 mmol/L    CO2 24 21 - 32 mmol/L    Anion gap 11 7 - 16 mmol/L    Glucose 118 (H) 65 - 100 mg/dL    BUN 10 8 - 23 MG/DL    Creatinine 1.04 0.8 - 1.5 MG/DL    GFR est AA >60 >60 ml/min/1.73m2    GFR est non-AA >60 >60 ml/min/1.73m2    Calcium 7.7 (L) 8.3 - 10.4 MG/DL   GLUCOSE, POC    Collection Time: 03/27/17 12:35 PM   Result Value Ref Range    Glucose (POC) 126 (H) 65 - 100 mg/dL   PLEASE READ & DOCUMENT PPD TEST IN 72 HRS    Collection Time: 03/27/17  3:10 PM   Result Value Ref Range    PPD  Negative    mm Induration  0 mm   PTT    Collection Time: 03/27/17  5:01 PM   Result Value Ref Range    aPTT 31.4 23.5 - 31.7 SEC   GLUCOSE, POC    Collection Time: 03/27/17  5:23 PM   Result Value Ref Range Glucose (POC) 110 (H) 65 - 100 mg/dL   GLUCOSE, POC    Collection Time: 03/27/17  7:48 PM   Result Value Ref Range    Glucose (POC) 125 (H) 65 - 100 mg/dL   FERRITIN    Collection Time: 03/27/17 10:25 PM   Result Value Ref Range    Ferritin 464 (H) 8 - 388 NG/ML   TRANSFERRIN SATURATION    Collection Time: 03/27/17 10:25 PM   Result Value Ref Range    Iron 21 (L) 35 - 150 ug/dL    TIBC 166 (L) 250 - 450 ug/dL    Transferrin Saturation 13 (L) >20 %   VITAMIN B12    Collection Time: 03/27/17 10:25 PM   Result Value Ref Range    Vitamin B12 514 193 - 986 pg/mL   FOLATE    Collection Time: 03/27/17 10:25 PM   Result Value Ref Range    Folate 6.9 3.1 - 17.5 ng/mL   PTT    Collection Time: 03/27/17 11:55 PM   Result Value Ref Range    aPTT 89.9 (H) 23.5 - 31.7 SEC   GLUCOSE, POC    Collection Time: 03/28/17  6:42 AM   Result Value Ref Range    Glucose (POC) 116 (H) 65 - 100 mg/dL   PROTHROMBIN TIME + INR    Collection Time: 03/28/17  7:35 AM   Result Value Ref Range    Prothrombin time 21.4 (H) 9.6 - 12.0 sec    INR 2.0 (H) 0.9 - 1.2     PTT    Collection Time: 03/28/17  7:35 AM   Result Value Ref Range    aPTT >110.0 (HH) 23.5 - 31.7 SEC   HEMOGLOBIN    Collection Time: 03/28/17  7:35 AM   Result Value Ref Range    HGB 9.1 (L) 13.6 - 17.2 g/dL   RETICULOCYTE COUNT    Collection Time: 03/28/17  7:35 AM   Result Value Ref Range    Reticulocyte count 6.2 (H) 0.3 - 2.0 %    Absolute Retic Cnt. 0.1884 (H) 0.026 - 0.095 M/ul    Immature Retic Fraction 29.0 (H) 2.3 - 13.4 %    Retic Hgb Conc. 28 (L) 29 - 35 pg   GLUCOSE, POC    Collection Time: 03/28/17 11:41 AM   Result Value Ref Range    Glucose (POC) 112 (H) 65 - 100 mg/dL       Functional Assessment:  Functional Assessment  Fall in Past 12 Months: Yes  Fall With Injury: No  Number of Falls Within 12 Months: 1  Approximate Date of Fall: 2017  Decline in Gait/Transfer/Balance: No  Decline in Capacity to Feed/Dress/Bathe: No  Developmental Delay: No  Chewing/Swallowing Problems: No  Difficulty with Secretions: No  Speech Slurred/Thick/Garbled: No     Strickland Score:          Ambulation:  Activity and Safety  Activity Level: Head of bed elevated (degrees), Up with Assistance  Ambulate: No (Comment)  Activity: In bed  Activity Assistance: Partial (one person)  Weight Bearing Status: NWB (Non Weight Bearing)  NWB (Non Weight Bearing extremities: BLE (Bilateral Lower Extremities)  Mode of Transportation: Stretcher  Repositioned: Head of bed elevated (degrees)  Patient Turned: Turns self  Safety Measures: Bed/Chair-Wheels locked, Bed in low position, Call light within reach, Gripper socks, Side rails X2     Impression/Plan:     Principal Problem:    DVT, lower extremity, proximal, acute ([facility] [address] (3/21/2017)    Active Problems:    Type 2 diabetes mellitus without complication ([facility] [address] (4/22/8563)      Benign non-nodular prostatic hyperplasia without lower urinary tract symptoms (7/22/2016)      Hypertriglyceridemia (7/22/2016)      Depression (7/22/2016)      Obstructive sleep apnea syndrome (7/22/2016)      Overview: Home CPAP      Morbid obesity due to excess calories ([facility] [address] (7/22/2016)      Primary insomnia (7/22/2016)      Benign essential HTN (9/13/2016)    Profound debilitation; multifactorial (pain, prolonged bed confinement, multiple medical issues, depression)    Recommendations: Continue Acute Rehab Program  Coordination of rehab/medical care  Counseling of PM & R care issues management  Monitoring and management of medical conditions per plan of care/orders   -very limited participation with therapy; will re order OT as they had signed off. Encouraged PT participation  -pt agreeable to an antidepressant; will start Celexa  -pain mgt; REC; LONG ACTING MS OR OXYCONTIN WITH SCHEDULED [address] WITH NON NARCOTIC FOR [address] [place]; I think his activity will improve if his pain is more controlled. Discussion with pt   Reviewed Therapies/Labs/Meds/Records  -at this time, pt too debilitated.  I do not think he could actively participate in 3hrs of therapy daily, nor would he tolerate it. I will follow progress. Encouraged pt to try to work thru some of his pain issues.     Signed By:  Anibal Kaplan MD     March 28, 2017

## 2017-03-28 NOTE — PROGRESS NOTES
PT note: attempted treatment this AM. Pt refused despite education/encouragment; states he is unable to participate due to pain.      Michelle Bedolla SPT

## 2017-03-28 NOTE — PROGRESS NOTES
Problem: Nutrition Deficit  Goal: *Optimize nutritional status  Nutrition  Reason for assessment: LOS   Assessment:   Diet order(s): [facility]  Food/Nutrition Patient History:  Pt reports he hasn't had much on an appetite for the past 2 months since his fall in February. He says he is eating ~1.3 of his usual intake and at most eats 1/2 of the meals here. He hasn't wanted anything \"heavy\" so he has been mostly ordering fruit plates and toast. Heavy foods to him includes eggs and meats. He is receptive to eating yogurt but only once a day. With RD encouragement he is receptive to trying grilled cheese, peanutbutter and jelly, tuna salad, and cheese cubes with his fruit plate. He also reluctantlyy agreed to try a liquid nutritional supplement. Anthropometrics:Height: 5' 6\" (167.6 cm),  Weight: 126.2 kg (278 lb 4.8 oz), Weight Source: Bed, Body mass index is 44.92 kg/(m^2). BMI class of obesity class II to III obesity for age >71  Macronutrient needs:  EER:  6191-0575 kcal /day (11-14 kcal/kg ABW)  EPR:  77-97 grams protein/day (1.2-1.5 grams/kg IBW)  Intake/Comparative Standards:  Average intake for past 7 day(s)/2 recorded meal(s): 100%. 2 meals does not represent a trend and recorded intake represent 100% of a fruit plate and is inconsistent with reported intake of 50%. Based on stated intake, pt is potentially meeeting ~20% of kcal and ~10% of protein needs     Nutrition Diagnosis: Inadequate oral intake r/t decreased ability to consume sufficient oral intake as evidenced by decreased appetite and intake as above     Intervention:  Meals and snacks: Continue current diet. Include yogurt once a day. Nutrition Supplement Therapy: Glucerna shake bid. Education:Duscussed source of protein and importance of protein intake.      Nathalie Blackmon, [address], [address] [place] [address]

## 2017-03-28 NOTE — PROGRESS NOTES
Hospitalist Progress Note    Patient: Anjum Monroy MRN: [id number]  SSN: [id number]    YOB: 1945  Age: 66 y.o. Sex: male      Admit Date: 3/21/2017    LOS: 7 days     Subjective:     66year old CM with a PMH of HTN, depression, DM2, ELIDIA, and questionable history of DVT/PE(?) since he has an IVC filter was recently at [facility] for compression fracture after a mechanical fall and was sent to rehab. While at rehab the patient's legs started swell so they sent him to see his PCP who did a doppler and discovered bilateral DVTs up to the iliac veins. He was admitted and received direct TPA by IR and has been on a Heparin bridge with Coumadin. His Hgb continued to drop down to 6.3 before getting 1U PRBC and hematology was consulted. 3/28 - This mornign the patient is emotional. He is complaining of left forearm pain where his IV site is and just below it. Denies CP/SOB. Denies F/C/N/V. Review of systems negative except stated above. Objective:     Vitals:    03/28/17 0000 03/28/17 0448 03/28/17 0639 03/28/17 1128   BP: 125/49 109/53 142/59 145/59   Pulse: 100 95 94 93   Resp: 16 16 16 16   Temp: 99.4 °F (37.4 °C) (!) 100.5 °F (38.1 °C) 98.5 °F (36.9 °C) 100.1 °F (37.8 °C)   SpO2: 93% 96% 93% 95%   Weight:       Height:            Intake and Output:  Current Shift: 03/28 0701 - 03/28 1900  In: 447 [P.O.:240; I.V.:207]  Out: 200 [Urine:200]    Physical Exam:   GENERAL: tearful, alert, cooperative, no distress, appears stated age  EYE: conjunctivae/corneas clear. PERRL. THROAT & NECK: normal and no erythema or exudates noted. LUNG: clear to auscultation bilaterally  HEART: regular rate and rhythm, S1S2, no murmur, no JVD  ABDOMEN: soft, non-tender, non-distended. Bowel sounds normal.   EXTREMITIES:  3+ bilateral LE edema  SKIN: Ecchymoses on RUE  NEUROLOGIC: A&Ox3. Cranial nerves 2-12 grossly intact.     Lab/Data Review:  BMP: No results found for: NA, K, CL, CO2, AGAP, GLU, BUN, CREA, GFRAA, GFRNA  CBC:   Lab Results   Component Value Date/Time    HGB 9.1 (L) 03/28/2017 07:35 AM     COAGS:   Lab Results   Component Value Date/Time    APTT >110.0 (HH) 03/28/2017 07:35 AM    PTP 21.4 (H) 03/28/2017 07:35 AM    INR 2.0 (H) 03/28/2017 07:35 AM        Assessment:     Principal Problem:    DVT, lower extremity, proximal, acute ([address] (3/21/2017)    Active Problems:    Type 2 diabetes mellitus without complication ([address] (4/28/7173)      Benign non-nodular prostatic hyperplasia without lower urinary tract symptoms (7/22/2016)      Hypertriglyceridemia (7/22/2016)      Depression (7/22/2016)      Obstructive sleep apnea syndrome (7/22/2016)      Overview: Home CPAP      Morbid obesity due to excess calories ([facility] [address] (7/22/2016)      Primary insomnia (7/22/2016)      Benign essential HTN (9/13/2016)        Plan:     - Recheck Hemoglobin as has not been done since post-transfusion  - Continue Heparin gtt today  - Continue Coumadin - INR 2.0 - per pharmacy  - Await hematology consult for hypercoagulable states and anemia  - SW to assist with discharge planning due to patient not wanting to return to rehab    Signed By: Eli Velasco DO     March 28, 2017

## 2017-03-28 NOTE — INTERDISCIPLINARY ROUNDS
Interdisciplinary team rounds were held 3/28/2017 with the following team members:Care Management, Nursing, Pastoral Care, Pharmacy and Physician. Plan of care discussed. See clinical pathway and/or care plan for interventions and desired outcomes.

## 2017-03-28 NOTE — PROGRESS NOTES
Care Management Interventions  PCP Verified by CM: Yes  Transition of Care Consult (CM Consult): Discharge Planning  Physical Therapy Consult: Yes  Current Support Network: Lives with Spouse    SW met with patient to initiate D/C planning. Pt stated that prior to admission he was at [facility] for STR- pt voiced he does not feel like he was getting what he needed at [facility] and today is requesting consideration for 9th floor [facility]. Pt stated at home he lives with his spouse and two dogs. Pt described himself as being independent with ADL's prior to hospitalizations- denied use of any DME at home besides his CPAP at night. Pt expressed he is hopeful to regain strength and return home- pt stated he did not feel able to participate in PT today due to pain but is hoping tomorrow will be better. SNOW will continue to work with pt/family on D/C planning.

## 2017-03-28 NOTE — PROGRESS NOTES
Pt declined for [facility]/ 4th Floor - pt does not have qualifying days to meet eligibility.

## 2017-03-28 NOTE — CONSULTS
Inpatient Hematology / Oncology Consult Note    Reason for Consult:  DVT, lower extremity, proximal, acute, bilateral [facility])  Referring Physician:  Jean Toledo DO    History of Present Illness:  Mr. Reina Castle is a 66 y.o. male admitted on 3/21/2017 with a primary diagnosis of DVT, lower extremity, proximal, acute, bilateral ([facility] [address] Diagnoses of Episode of recurrent major depressive disorder, unspecified depression episode severity ([address] and Physical debility were also pertinent to this visit. He has a PMH of HTN, depression, DM2, ELIDIA and DVT in 2013 with IVC filter in place. He was admitted at [facility] on 2/8/17 after he fell getting out of his recliner and hit his head and hurt his back. He was treated for UTI and hypovolemic shock. He required 2 runs on HD for JIM as well. He was found to have a L2 burst fracture via CT. During his admission he had a CT of the abdomen that essentially benign. Back in February 2016 he had a sigmoidoscopy that found diverticulosis/diverticulitis. His hemoglobin ranged from 10.1-15.7 during his [facility] admission. From [facility] he was sent to [facility] for rehab and he began having swelling of bilateral legs that were dopplered and discovered bilateral extensive DVTs. He then presented to the ED at [facility] on 3/21/17 where he was admitted and received direct TPA by IR and was placed on a Heparin bridge with Coumadin. His heparin drip was discontinued this AM.  His Hgb on admission was 10.4 and it has since trended down. Yesterday it was 6.3 and he has received a total of 3 units of blood with a Hgb today of 9.1. He denies any history of anemia or bleeding. He denies any obvious blood in stools or dark colored stools. No nausea or vomiting. Denies any headaches. His biggest complaint is of his back pain related to his L2 fracture, left FA pain that he states begin when he fell and b/l leg pain. He states he is unable to get up out of the bed when PT attempted.       Review of Systems:  Constitutional Denies fever, chills, weight loss, appetite changes, fatigue, night sweats. HEENT Denies trauma, blurry vision, hearing loss, ear pain, nosebleeds, sore throat, neck pain and ear discharge. Skin Denies lesions or rashes. Lungs Denies dyspnea, cough, sputum production or hemoptysis. Cardiovascular Denies chest pain, palpitations, or lower extremity edema. Gastrointestinal Denies nausea, vomiting, changes in bowel habits, bloody or black stools, abdominal pain.  Denies dysuria, frequency or hesitancy of urination. Neuro Denies headaches, visual changes or ataxia. Denies dizziness, tingling, tremors, sensory change, speech change, focal weakness or headaches. Hematology Denies easy bruising or bleeding, denies gingival bleeding or epistaxis. Endo Denies heat/cold intolerance, denies diabetes or thyroid abnormalities. MSK +back pain due to recent fall and fx. Psychiatric/Behavioral The patient is tearful/depressed. Allergies   Allergen Reactions    Sulfite Hives     Past Medical History:   Diagnosis Date    Calculus of kidney     Diabetes ([facility] [address]     DVT (deep venous thrombosis) ([facility] [address] 2013    s/p IVC filter      Past Surgical History:   Procedure Laterality Date    HX UROLOGICAL       Family History   Problem Relation Age of Onset    Cancer Brother      lung cancer    Deep Vein Thrombosis Other      father    Heart Disease Other      mother     Social History     Social History    Marital status:      Spouse name: N/A    Number of children: N/A    Years of education: N/A     Occupational History    Not on file.      Social History Main Topics    Smoking status: Former Smoker    Smokeless tobacco: Not on file    Alcohol use 0.0 oz/week     0 Standard drinks or equivalent per week    Drug use: No    Sexual activity: Not on file     Other Topics Concern    Not on file     Social History Narrative     Current Facility-Administered Medications   Medication Dose Route Frequency Provider Last Rate Last Dose    [START ON 3/29/2017] citalopram (CELEXA) tablet 20 mg  20 mg Oral DAILY Shanda Plasencia MD        warfarin (COUMADIN) tablet 4 mg  4 mg Oral QHS Jackquelyn Camera, DO   4 mg at 03/27/17 2128    0.9% sodium chloride infusion 250 mL  250 mL IntraVENous PRN Jackquelyn Camera, DO        0.9% sodium chloride infusion 250 mL  250 mL IntraVENous PRN Jackquelyn Camera, DO        0.9% sodium chloride infusion 250 mL  250 mL IntraVENous PRN Jackquelyn Camera, DO        acetaminophen (TYLENOL) tablet 650 mg  650 mg Oral Q6H PRN Jackquelyn Camera, DO   325 mg at 03/28/17 1140    famotidine (PEPCID) tablet 20 mg  20 mg Oral BID Jackquelyn Camera, DO   20 mg at 03/28/17 0820    polyethylene glycol (MIRALAX) packet 17 g  17 g Oral DAILY Jackquelyn Camera, DO   17 g at 03/28/17 0820    Saccharomyces boulardii (FLORASTOR) capsule 250 mg  250 mg Oral BID Jackquelyn Camera, DO   250 mg at 03/28/17 4920    tamsulosin (FLOMAX) capsule 0.4 mg  0.4 mg Oral DAILY Jackquelyn Camera, DO   0.4 mg at 03/28/17 1097    cholestyramine light (QUESTRAN LITE) packet 4 g  4 g Oral BID WITH MEALS Jackquelyn Camera, DO   4 g at 03/28/17 0820    olmesartan (BENICAR) tablet 40 mg  40 mg Oral DAILY Jackquelyn Camera, DO   40 mg at 03/28/17 0820    bisacodyl (DULCOLAX) suppository 10 mg  10 mg Rectal DAILY PRN Jackquelyn Camera, DO   10 mg at 03/24/17 1030    LORazepam (ATIVAN) tablet 1 mg  1 mg Oral BID PRN Carlo Gilbert MD   1 mg at 03/25/17 0143    heparin 25,000 units in dextrose 500 mL infusion  18-36 Units/kg/hr (Adjusted) IntraVENous TITRATE Jackquelyn Camera, DO 50.2 mL/hr at 03/28/17 1202 29 Units/kg/hr at 03/28/17 1202    zolpidem (AMBIEN) tablet 5 mg  5 mg Oral QHS PRN Doris Fagan MD   5 mg at 03/25/17 2209    ondansetron (ZOFRAN) injection 4 mg  4 mg IntraVENous Q6H PRN Carlo Gilbert MD       [facility] HYDROcodone-acetaminophen (NORCO) 7.5-325 mg per tablet 1 Tab  1 Tab Oral Q6H PRN Marcell Dominguez MD   1 Tab at 17 1140    morphine injection 4 mg  4 mg IntraVENous Q4H PRN Bhumi Mota MD   4 mg at 17 1021    hydrALAZINE (APRESOLINE) 20 mg/mL injection 20 mg  20 mg IntraVENous Q6H PRN Bhumi Mota MD   20 mg at 17 1945    sodium chloride (NS) flush 5-10 mL  5-10 mL IntraVENous Q8H Marcell Dominguez MD   5 mL at 17 1400    sodium chloride (NS) flush 5-10 mL  5-10 mL IntraVENous PRN Courtney Maldonado PA   5 mL at 17 1022    insulin lispro (HUMALOG) injection   SubCUTAneous AC&HS Courtney Mario., PA   Stopped at 17 0730       OBJECTIVE:  Patient Vitals for the past 8 hrs:   BP Temp Pulse Resp SpO2   17 1414 116/57 99.2 °F (37.3 °C) 93 16 92 %   17 1128 145/59 100.1 °F (37.8 °C) 93 16 95 %     Temp (24hrs), Av.4 °F (37.4 °C), Min:98.5 °F (36.9 °C), Max:100.5 °F (38.1 °C)     0701 -  1900  In: 447 [P.O.:240; I.V.:207]  Out: 200 [Urine:200]    Physical Exam:  Constitutional: Well developed, well nourished male in no acute distress, lying comfortably in the hospital bed. HEENT: Normocephalic and atraumatic. Oropharynx is clear, mucous membranes are moist. Sclerae anicteric. Neck supple without JVD. No thyromegaly present. Lymph node   Deferred. Skin Warm and dry. No bruising and no rash noted. No erythema. No pallor. Respiratory Lungs are clear to auscultation bilaterally without wheezes, rales or rhonchi, normal air exchange without accessory muscle use. CVS Normal rate, regular rhythm and normal S1 and S2. No murmurs, gallops, or rubs. Abdomen Soft, nontender and nondistended, normoactive bowel sounds. No palpable mass. Unable to assess hepatosplenomegaly due to body habitus. Neuro Grossly nonfocal with no obvious sensory or motor deficits. MSK Decreased range of motion in general related to pain. Bilateral LE edema with tenderness. Psych Depressed mood and affect.           Labs:    Recent Results (from the past 24 hour(s))   PTT    Collection Time: 03/27/17  5:01 PM   Result Value Ref Range    aPTT 31.4 23.5 - 31.7 SEC   GLUCOSE, POC    Collection Time: 03/27/17  5:23 PM   Result Value Ref Range    Glucose (POC) 110 (H) 65 - 100 mg/dL   GLUCOSE, POC    Collection Time: 03/27/17  7:48 PM   Result Value Ref Range    Glucose (POC) 125 (H) 65 - 100 mg/dL   FERRITIN    Collection Time: 03/27/17 10:25 PM   Result Value Ref Range    Ferritin 464 (H) 8 - 388 NG/ML   TRANSFERRIN SATURATION    Collection Time: 03/27/17 10:25 PM   Result Value Ref Range    Iron 21 (L) 35 - 150 ug/dL    TIBC 166 (L) 250 - 450 ug/dL    Transferrin Saturation 13 (L) >20 %   VITAMIN B12    Collection Time: 03/27/17 10:25 PM   Result Value Ref Range    Vitamin B12 514 193 - 986 pg/mL   FOLATE    Collection Time: 03/27/17 10:25 PM   Result Value Ref Range    Folate 6.9 3.1 - 17.5 ng/mL   PTT    Collection Time: 03/27/17 11:55 PM   Result Value Ref Range    aPTT 89.9 (H) 23.5 - 31.7 SEC   GLUCOSE, POC    Collection Time: 03/28/17  6:42 AM   Result Value Ref Range    Glucose (POC) 116 (H) 65 - 100 mg/dL   PROTHROMBIN TIME + INR    Collection Time: 03/28/17  7:35 AM   Result Value Ref Range    Prothrombin time 21.4 (H) 9.6 - 12.0 sec    INR 2.0 (H) 0.9 - 1.2     PTT    Collection Time: 03/28/17  7:35 AM   Result Value Ref Range    aPTT >110.0 (HH) 23.5 - 31.7 SEC   HEMOGLOBIN    Collection Time: 03/28/17  7:35 AM   Result Value Ref Range    HGB 9.1 (L) 13.6 - 17.2 g/dL   RETICULOCYTE COUNT    Collection Time: 03/28/17  7:35 AM   Result Value Ref Range    Reticulocyte count 6.2 (H) 0.3 - 2.0 %    Absolute Retic Cnt. 0.1884 (H) 0.026 - 0.095 M/ul    Immature Retic Fraction 29.0 (H) 2.3 - 13.4 %    Retic Hgb Conc. 28 (L) 29 - 35 pg   GLUCOSE, POC    Collection Time: 03/28/17 11:41 AM   Result Value Ref Range    Glucose (POC) 112 (H) 65 - 100 mg/dL       Imaging:  [unfilled]    ASSESSMENT:  Problem List  Date Reviewed: 3/21/2017          Codes Class Noted    * (Principal)DVT, lower extremity, proximal, acute ([facility] ICD-10-CM: I82.4Y9  ICD-9-CM: 453.41  3/21/2017        Benign essential HTN ICD-10-CM: I10  ICD-9-CM: 401.1  9/13/2016        Type 2 diabetes mellitus without complication ([facility] [address] [id number]: E11.9  ICD-9-CM: 250.00  7/22/2016        Benign non-nodular prostatic hyperplasia without lower urinary tract symptoms ICD-10-CM: N40.0  ICD-9-CM: 600.90  7/22/2016        Hypertriglyceridemia ICD-10-CM: E78.1  ICD-9-CM: 272.1  7/22/2016        Corporo-venous occlusive erectile dysfunction ICD-10-CM: N52.02  ICD-9-CM: 607.84  7/22/2016        Depression ICD-10-CM: F32.9  ICD-9-CM: 311  7/22/2016        Obstructive sleep apnea syndrome ICD-10-CM: G47.33  ICD-9-CM: 327.23  7/22/2016    Overview Signed 3/21/2017 10:40 PM by BIJAN Jones     Home CPAP             Morbid obesity due to excess calories [facility]) ICD-10-CM: E66.01  ICD-9-CM: 278.01  7/22/2016        Primary insomnia ICD-10-CM: F51.01  ICD-9-CM: 307.42  7/22/2016                RECOMMENDATIONS:  Acute Anemia  Check Haptoglobin, Iron studies, Vitamin B12, Folate, Retic Count, Guiac-stool  We also ordered a xray of his left FA due to his pain that he states continues since his fall, denies previous imaging to that area. Lab studies and imaging studies  were personally reviewed. Pertinent old records were reviewed from previous admission at [facility]. Thank you for allowing us to participate in the care of Mr. Gomez. We will continue to follow along with the above plan.             Muna Murphy, NP   [address] [facility]  [address] [place] [address]  Office : [phone]  Fax : [phone]

## 2017-03-28 NOTE — PROGRESS NOTES
Warfarin dosing per pharmacist    Jennifer Peraza is a 66 y.o. male. Height: 5' 6\" (167.6 cm)    Weight: 126.2 kg (278 lb 4.8 oz)    Indication:  Bilateral LE DVT, thrombosed IVC    Goal INR:  2-3    Home dose:  New start    Risk factors or significant drug interactions: Other anticoagulants:  Heparin bridge    Daily Monitoring  Date  INR     Warfarin dose HGB              Notes  3/24  1.8  5 mg  9.4  3/25  1.1  5 mg   ---  3/26  1.2  5 mg  7.1  3/27  1.7  4 mg  ---  3/28  2  4mg    Pharmacy consulted to dose pt coumadin on 3/24. Pt s/p EKOS and now starting coumadin bridged with heparin. INR therapeutic. Will give warfarin 4mg again tonight and continue heparin drip for 1 more day. Will continue to follow daily INRs.       Thank you,  Renzo Gentile, PharmD

## 2017-03-29 ENCOUNTER — APPOINTMENT (OUTPATIENT)
Dept: ULTRASOUND IMAGING | Age: 72
DRG: 271 | End: 2017-03-29
Attending: INTERNAL MEDICINE
Payer: MEDICARE

## 2017-03-29 ENCOUNTER — APPOINTMENT (OUTPATIENT)
Dept: CT IMAGING | Age: 72
DRG: 271 | End: 2017-03-29
Attending: NURSE PRACTITIONER
Payer: MEDICARE

## 2017-03-29 LAB
ALBUMIN SERPL BCP-MCNC: 1.9 G/DL (ref 3.2–4.6)
ALBUMIN/GLOB SERPL: 0.5 {RATIO} (ref 1.2–3.5)
ALP SERPL-CCNC: 91 U/L (ref 50–136)
ALT SERPL-CCNC: 12 U/L (ref 12–65)
ANION GAP BLD CALC-SCNC: 12 MMOL/L (ref 7–16)
APTT PPP: 69.2 SEC (ref 23.5–31.7)
AST SERPL W P-5'-P-CCNC: 18 U/L (ref 15–37)
BASOPHILS # BLD AUTO: 0.1 K/UL (ref 0–0.2)
BASOPHILS # BLD: 1 % (ref 0–2)
BILIRUB SERPL-MCNC: 0.7 MG/DL (ref 0.2–1.1)
BUN SERPL-MCNC: 12 MG/DL (ref 8–23)
CALCIUM SERPL-MCNC: 8 MG/DL (ref 8.3–10.4)
CHLORIDE SERPL-SCNC: 103 MMOL/L (ref 98–107)
CO2 SERPL-SCNC: 25 MMOL/L (ref 21–32)
CREAT SERPL-MCNC: 0.95 MG/DL (ref 0.8–1.5)
DIFFERENTIAL METHOD BLD: ABNORMAL
EOSINOPHIL # BLD: 0.2 K/UL (ref 0–0.8)
EOSINOPHIL NFR BLD: 2 % (ref 0.5–7.8)
ERYTHROCYTE [DISTWIDTH] IN BLOOD BY AUTOMATED COUNT: 17.4 % (ref 11.9–14.6)
ERYTHROCYTE [DISTWIDTH] IN BLOOD BY AUTOMATED COUNT: 18 % (ref 11.9–14.6)
GLOBULIN SER CALC-MCNC: 3.9 G/DL (ref 2.3–3.5)
GLUCOSE BLD STRIP.AUTO-MCNC: 106 MG/DL (ref 65–100)
GLUCOSE BLD STRIP.AUTO-MCNC: 111 MG/DL (ref 65–100)
GLUCOSE BLD STRIP.AUTO-MCNC: 117 MG/DL (ref 65–100)
GLUCOSE BLD STRIP.AUTO-MCNC: 129 MG/DL (ref 65–100)
GLUCOSE SERPL-MCNC: 83 MG/DL (ref 65–100)
HCT VFR BLD AUTO: 27.9 % (ref 41.1–50.3)
HCT VFR BLD AUTO: 30 % (ref 41.1–50.3)
HEMOCCULT STL QL: NEGATIVE
HGB BLD-MCNC: 8.8 G/DL (ref 13.6–17.2)
HGB BLD-MCNC: 9.5 G/DL (ref 13.6–17.2)
IMM GRANULOCYTES # BLD: 0.1 K/UL (ref 0–0.5)
INR PPP: 2.2 (ref 0.9–1.2)
LYMPHOCYTES # BLD AUTO: 18 % (ref 13–44)
LYMPHOCYTES # BLD: 2.1 K/UL (ref 0.5–4.6)
MCH RBC QN AUTO: 29.9 PG (ref 26.1–32.9)
MCH RBC QN AUTO: 30 PG (ref 26.1–32.9)
MCHC RBC AUTO-ENTMCNC: 31.5 G/DL (ref 31.4–35)
MCHC RBC AUTO-ENTMCNC: 31.7 G/DL (ref 31.4–35)
MCV RBC AUTO: 94.6 FL (ref 79.6–97.8)
MCV RBC AUTO: 94.9 FL (ref 79.6–97.8)
MONOCYTES # BLD: 1.2 K/UL (ref 0.1–1.3)
MONOCYTES NFR BLD AUTO: 10 % (ref 4–12)
NEUTS SEG # BLD: 8.1 K/UL (ref 1.7–8.2)
NEUTS SEG NFR BLD AUTO: 69 % (ref 43–78)
PLATELET # BLD AUTO: 291 K/UL (ref 150–450)
PLATELET # BLD AUTO: 42 K/UL (ref 150–450)
PLATELET COMMENTS,PCOM: ADEQUATE
PMV BLD AUTO: 10.5 FL (ref 10.8–14.1)
PMV BLD AUTO: 9.9 FL (ref 10.8–14.1)
POTASSIUM SERPL-SCNC: 3.8 MMOL/L (ref 3.5–5.1)
PROT SERPL-MCNC: 5.8 G/DL (ref 6.3–8.2)
PROTHROMBIN TIME: 24.4 SEC (ref 9.6–12)
RBC # BLD AUTO: 2.94 M/UL (ref 4.23–5.67)
RBC # BLD AUTO: 3.17 M/UL (ref 4.23–5.67)
RBC MORPH BLD: ABNORMAL
RBC MORPH BLD: ABNORMAL
SODIUM SERPL-SCNC: 140 MMOL/L (ref 136–145)
WBC # BLD AUTO: 11.8 K/UL (ref 4.3–11.1)
WBC # BLD AUTO: 12.9 K/UL (ref 4.3–11.1)
WBC MORPH BLD: ABNORMAL

## 2017-03-29 PROCEDURE — 97530 THERAPEUTIC ACTIVITIES: CPT

## 2017-03-29 PROCEDURE — 65270000029 HC RM PRIVATE

## 2017-03-29 PROCEDURE — 85610 PROTHROMBIN TIME: CPT | Performed by: INTERNAL MEDICINE

## 2017-03-29 PROCEDURE — 85730 THROMBOPLASTIN TIME PARTIAL: CPT | Performed by: INTERNAL MEDICINE

## 2017-03-29 PROCEDURE — 99232 SBSQ HOSP IP/OBS MODERATE 35: CPT | Performed by: PHYSICAL MEDICINE & REHABILITATION

## 2017-03-29 PROCEDURE — 97166 OT EVAL MOD COMPLEX 45 MIN: CPT

## 2017-03-29 PROCEDURE — 74011250637 HC RX REV CODE- 250/637: Performed by: RADIOLOGY

## 2017-03-29 PROCEDURE — 74011250637 HC RX REV CODE- 250/637: Performed by: INTERNAL MEDICINE

## 2017-03-29 PROCEDURE — 74011250636 HC RX REV CODE- 250/636: Performed by: INTERNAL MEDICINE

## 2017-03-29 PROCEDURE — 82962 GLUCOSE BLOOD TEST: CPT

## 2017-03-29 PROCEDURE — 74176 CT ABD & PELVIS W/O CONTRAST: CPT

## 2017-03-29 PROCEDURE — 36415 COLL VENOUS BLD VENIPUNCTURE: CPT | Performed by: INTERNAL MEDICINE

## 2017-03-29 PROCEDURE — 77030019605

## 2017-03-29 PROCEDURE — 85027 COMPLETE CBC AUTOMATED: CPT | Performed by: INTERNAL MEDICINE

## 2017-03-29 PROCEDURE — 81001 URINALYSIS AUTO W/SCOPE: CPT | Performed by: INTERNAL MEDICINE

## 2017-03-29 PROCEDURE — 93971 EXTREMITY STUDY: CPT

## 2017-03-29 PROCEDURE — 80053 COMPREHEN METABOLIC PANEL: CPT | Performed by: NURSE PRACTITIONER

## 2017-03-29 PROCEDURE — 74011250637 HC RX REV CODE- 250/637: Performed by: PHYSICAL MEDICINE & REHABILITATION

## 2017-03-29 PROCEDURE — 85025 COMPLETE CBC W/AUTO DIFF WBC: CPT | Performed by: NURSE PRACTITIONER

## 2017-03-29 PROCEDURE — 82272 OCCULT BLD FECES 1-3 TESTS: CPT | Performed by: INTERNAL MEDICINE

## 2017-03-29 RX ORDER — OXYCODONE HYDROCHLORIDE 5 MG/1
10 TABLET ORAL EVERY 6 HOURS
Status: DISCONTINUED | OUTPATIENT
Start: 2017-03-29 | End: 2017-04-01

## 2017-03-29 RX ORDER — WARFARIN 2 MG/1
4 TABLET ORAL EVERY EVENING
Status: DISCONTINUED | OUTPATIENT
Start: 2017-03-29 | End: 2017-03-29

## 2017-03-29 RX ORDER — IBUPROFEN 400 MG/1
400 TABLET ORAL
Status: DISCONTINUED | OUTPATIENT
Start: 2017-03-29 | End: 2017-03-29

## 2017-03-29 RX ORDER — GABAPENTIN 100 MG/1
100 CAPSULE ORAL
Status: DISCONTINUED | OUTPATIENT
Start: 2017-03-29 | End: 2017-04-04 | Stop reason: HOSPADM

## 2017-03-29 RX ORDER — OXYCODONE HCL 10 MG/1
10 TABLET, FILM COATED, EXTENDED RELEASE ORAL EVERY 12 HOURS
Status: DISCONTINUED | OUTPATIENT
Start: 2017-03-29 | End: 2017-04-04 | Stop reason: HOSPADM

## 2017-03-29 RX ADMIN — CHOLESTYRAMINE 4 G: 4 POWDER, FOR SUSPENSION ORAL at 08:15

## 2017-03-29 RX ADMIN — Medication 10 ML: at 23:33

## 2017-03-29 RX ADMIN — WARFARIN SODIUM 4 MG: 2 TABLET ORAL at 17:28

## 2017-03-29 RX ADMIN — FAMOTIDINE 20 MG: 20 TABLET, FILM COATED ORAL at 17:28

## 2017-03-29 RX ADMIN — Medication 10 ML: at 13:54

## 2017-03-29 RX ADMIN — GABAPENTIN 100 MG: 100 CAPSULE ORAL at 23:31

## 2017-03-29 RX ADMIN — RDII 250 MG CAPSULE 250 MG: at 08:15

## 2017-03-29 RX ADMIN — HYDROCODONE BITARTRATE AND ACETAMINOPHEN 1 TABLET: 7.5; 325 TABLET ORAL at 07:16

## 2017-03-29 RX ADMIN — POLYETHYLENE GLYCOL 3350 17 G: 17 POWDER, FOR SOLUTION ORAL at 08:15

## 2017-03-29 RX ADMIN — OXYCODONE HYDROCHLORIDE 10 MG: 5 TABLET ORAL at 17:28

## 2017-03-29 RX ADMIN — HEPARIN SODIUM AND DEXTROSE 26 UNITS/KG/HR: 5000; 5 INJECTION INTRAVENOUS at 04:46

## 2017-03-29 RX ADMIN — OXYCODONE HYDROCHLORIDE 10 MG: 10 TABLET, FILM COATED, EXTENDED RELEASE ORAL at 13:33

## 2017-03-29 RX ADMIN — ACETAMINOPHEN 650 MG: 325 TABLET, FILM COATED ORAL at 00:41

## 2017-03-29 RX ADMIN — OXYCODONE HYDROCHLORIDE 10 MG: 10 TABLET, FILM COATED, EXTENDED RELEASE ORAL at 23:31

## 2017-03-29 RX ADMIN — CITALOPRAM HYDROBROMIDE 20 MG: 20 TABLET ORAL at 08:15

## 2017-03-29 RX ADMIN — FAMOTIDINE 20 MG: 20 TABLET, FILM COATED ORAL at 08:16

## 2017-03-29 RX ADMIN — OXYCODONE HYDROCHLORIDE 10 MG: 5 TABLET ORAL at 13:15

## 2017-03-29 RX ADMIN — RDII 250 MG CAPSULE 250 MG: at 17:28

## 2017-03-29 RX ADMIN — TAMSULOSIN HYDROCHLORIDE 0.4 MG: 0.4 CAPSULE ORAL at 08:15

## 2017-03-29 RX ADMIN — OLMESARTAN MEDOXOMIL 40 MG: 40 TABLET, FILM COATED ORAL at 08:15

## 2017-03-29 NOTE — PROGRESS NOTES
Hospitalist Progress Note    Patient: Denny Lawson MRN: [id number]  SSN: [id number]    YOB: 1945  Age: 66 y.o. Sex: male      Admit Date: 3/21/2017    LOS: 8 days     Subjective:     66year old CM with a PMH of HTN, depression, DM2, ELIDIA, and questionable history of DVT/PE(?) since he has an IVC filter was recently at [facility] for compression fracture after a mechanical fall and was sent to rehab. While at rehab the patient's legs started swell so they sent him to see his PCP who did a doppler and discovered bilateral DVTs up to the iliac veins. He was admitted and received direct TPA by IR and has been on a Heparin bridge with Coumadin. His Hgb continued to drop down to 6.3 before getting 1U PRBC and hematology was consulted. 3/28 - This mornign the patient is emotional. He is complaining of left forearm pain where his IV site is and just below it. Denies CP/SOB. Denies F/C/N/V.  3/29 - The patient continues to have LUE swelling and pain despite removing IV and icing. States that his legs and back hurt too much to get out of bed. Sat up to side of bed with PT, but could not stand. Review of systems negative except stated above. Objective:     Vitals:    03/29/17 0034 03/29/17 0300 03/29/17 0803 03/29/17 1125   BP: 147/54 124/63 130/55 131/61   Pulse: 99 93 96 93   Resp: 18 16 16 16   Temp: (!) 101 °F (38.3 °C) 98.2 °F (36.8 °C) 98.6 °F (37 °C) 98.1 °F (36.7 °C)   SpO2: 92% 95% 94% 95%   Weight:       Height:            Intake and Output: Not Accurate    Physical Exam:   GENERAL: alert, cooperative, no distress, appears stated age  EYE: conjunctivae/corneas clear. PERRL. THROAT & NECK: normal and no erythema or exudates noted. LUNG: clear to auscultation bilaterally  HEART: regular rate and rhythm, S1S2, no murmur, no JVD  ABDOMEN: soft, non-tender, non-distended. Bowel sounds normal.   EXTREMITIES:  3+ bilateral LE edema  SKIN: Ecchymoses on RUE  NEUROLOGIC: A&Ox3.  Cranial nerves 2-12 grossly intact. Lab/Data Review:  BMP: No results found for: NA, K, CL, CO2, AGAP, GLU, BUN, CREA, GFRAA, GFRNA  CBC:   Lab Results   Component Value Date/Time    WBC 11.8 (H) 03/29/2017 06:08 AM    HGB 8.8 (L) 03/29/2017 06:08 AM    HCT 27.9 (L) 03/29/2017 06:08 AM     03/29/2017 06:08 AM     COAGS:   Lab Results   Component Value Date/Time    APTT 69.2 (H) 03/29/2017 06:08 AM    PTP 24.4 (H) 03/29/2017 06:08 AM    INR 2.2 (H) 03/29/2017 06:08 AM        Assessment:     Principal Problem:    DVT, lower extremity, proximal, acute ([address] (3/21/2017)    Active Problems:    Type 2 diabetes mellitus without complication ([address] (9/14/0587)      Benign non-nodular prostatic hyperplasia without lower urinary tract symptoms (7/22/2016)      Hypertriglyceridemia (7/22/2016)      Depression (7/22/2016)      Obstructive sleep apnea syndrome (7/22/2016)      Overview: Home CPAP      Morbid obesity due to excess calories ([facility] [address] (7/22/2016)      Primary insomnia (7/22/2016)      Benign essential HTN (9/13/2016)        Plan:     - Appropriate response after PRBCs.  Check Hgb daily.  - Stop Heparin gtt today  - Check UA with fever overnight and increasing WBC - no cough or SOB  - Continue Coumadin - INR 2.0 --> 2.2 - per pharmacy  - Check LUE duplex to r/o DVT  - Added Oxycontin 10mg Q12H scheduled, Norco PRN, Ibuprofen PRN, Tylenol PRN  - Will need to work with PT  - Appreciate hematology's input and assitance - will need hypercoagulation w/o as OP?  - Appreciate PM&Rs input and assistance  - SW to assist with discharge planning due to patient not wanting to return to rehab    Signed By: Ana Randall,      March 29, 2017

## 2017-03-29 NOTE — PROGRESS NOTES
Problem: Mobility Impaired (Adult and Pediatric)  Goal: *Acute Goals and Plan of Care (Insert Text)  STG:  (1.)Mr. Lynnette Boles will move from supine to sit and sit to supine , scoot up and down and roll side to side with MINIMAL ASSIST within 5 day(s). (2.)Mr. Lynnette Boles will transfer from bed to chair and chair to bed with MODERATE ASSIST using the least restrictive device within 5 day(s). (3.)Mr. Lynnette Boles will ambulate with MODERATE ASSIST for 5 feet with the least restrictive device within 5 day(s). (4.)Mr. Lynnette Boles will tolerate 25 minutes of therapeutic activity/exercise within 5 days in order to Improve activity tolerance for mobility. (5.)Mr. Lynnette Boles will perform LE exercises with 1 to 2 cues for form within 3 days to improve strength for functional transfers and ambulation. LTG:  (1.)Mr. Lynnette Boles will move from supine to sit and sit to supine , scoot up and down and roll side to side in bed with CONTACT GUARD ASSIST within 10 day(s). (2.)Mr. Lynnette Boles will transfer from bed to chair and chair to bed with MINIMAL ASSIST using the least restrictive device within 10 day(s). (3.)Mr. Lynnette Boles will ambulate with MINIMAL ASSIST for 25 feet with the least restrictive device within 10 day(s). Goals to be updated as patient progresses. ________________________________________________________________________________________________      PHYSICAL THERAPY: Daily Note, Treatment Day: 2nd and AM 3/29/2017  INPATIENT: Hospital Day: 9  [place] / Product Type: Medicare /      NAME/AGE/GENDER: Nika Jenkins is a 66 y.o. male            PRIMARY DIAGNOSIS: DVT, lower extremity, proximal, acute, bilateral ([facility]) DVT, lower extremity, proximal, acute ([facility] [address] DVT, lower extremity, proximal, acute [facility]        ICD-10: Treatment Diagnosis:       · Difficulty in walking, Not elsewhere classified (R26.2)   Precaution/Allergies:  Sulfite       ASSESSMENT:      Mr. Lynnette Boles is a 66 y.o. male admitted with extensive DVTs of the LEs. Pt today has continued complaints of LE pain (L>R) and L UE pain. He requires significant assistance with mobility but was able to sit at edge of bed with fair to good balance at times. Has great difficulty with scooting in all directions and essentially requires total assist to scoot in all positions. Sit-stand transfers performed x2 with MAX assist of 2 each time and max cueing for positioning, body mechanics, and weight shifts. On second trial pt was able to clear the bed but still stands with significantly forward flexed posture and was unable to stand upright or take any steps. Stood for ~6 seconds then c/o feeling dizzy and needing to sit down. Pt declines further activity and is adamant about returning to supine. Did so with max assist of 2 and repositioned with total assist/L UE elevated. Nursing staff informed of pt stating he needs to be cleaned. Mr. Jeane Hough did demonstrate some progress today with bed mobility, seated balance, and transfers however is still functioning very far below baseline and will need continued therapy during hospital stay and at rehab. This section established at most recent assessment   PROBLEM LIST (Impairments causing functional limitations):  1. Decreased Strength  2. Decreased ADL/Functional Activities  3. Decreased Transfer Abilities  4. Decreased Ambulation Ability/Technique  5. Decreased Balance  6. Increased Pain  7. Decreased Knowledge of Precautions  8. Decreased [place] with Home Exercise Program    INTERVENTIONS PLANNED: (Benefits and precautions of physical therapy have been discussed with the patient.)  1. Balance Exercise  2. Bed Mobility  3. Family Education  4. Gait Training  5. Home Exercise Program (HEP)  6. Therapeutic Activites  7. Therapeutic Exercise/Strengthening  8. Transfer Training  9. Patient Education  10.  Group Therapy      TREATMENT PLAN: Frequency/Duration: 3 times a week for duration of hospital stay  Rehabilitation Potential For Stated Goals: FAIR      RECOMMENDED REHABILITATION/EQUIPMENT: (at time of discharge pending progress): Continue Skilled Therapy and Rehab. HISTORY:   History of Present Injury/Illness (Reason for Referral):  Per MD Note: \"Eliud Tidwell is a 66 y.o. male that presented to the ED with 2 week history of worsening swelling of the bilateral lower extremities with increasing difficulty with ambulation. He had OP US today showing extensive DVT. He is currently undergoing rehab at [facility] following admission at [facility] for JIM requiring 2 runs of HD. He has history of DVT in 2013 and had IVC filter placed at that time. Patient denies dyspnea or chest pain. The hospitalist have been asked to admit. \"  Past Medical History/Comorbidities:   Mr. Klaus Bob  has a past medical history of Calculus of kidney; Diabetes ([address] and DVT (deep venous thrombosis) ([facility] [address] (2013). Mr. Klaus Bob  has a past surgical history that includes urological.  Social History/Living Environment:   Home Environment: Skilled nursing facility  One/Two Story Residence: One story  Living Alone: No  Support Systems: Spouse/Significant Other/Partner  Patient Expects to be Discharged to[de-identified] Private residence  Current DME Used/Available at Home: None  Prior Level of Function/Work/Activity:  Patient ambulatory prior to initial hospitalization. Unsure of current \"baseline\" at rehab. Number of Personal Factors/Comorbidities that affect the Plan of Care: 1-2: MODERATE COMPLEXITY   EXAMINATION:   Most Recent Physical Functioning:   Gross Assessment:                  Posture:     Balance:  Sitting: Impaired  Sitting - Static: Good (unsupported)  Sitting - Dynamic: Fair (occasional)  Standing: Impaired  Standing - Static: Poor  Standing - Dynamic : Poor Bed Mobility:  Sit to Supine: Maximum assistance;Assist x2  Scooting:  Total assistance  Wheelchair Mobility:     Transfers:  Sit to Stand: Maximum assistance;Assist x2  Stand to Sit: Moderate assistance  Interventions: Verbal cues; Visual cues; Safety awareness training; Tactile cues;Manual cues  Duration: 15 Minutes  Gait:             Body Structures Involved:  1. Heart  2. Lungs  3. Muscles Body Functions Affected:  1. Sensory/Pain  2. Hematological  3. Neuromusculoskeletal  4. Movement Related Activities and Participation Affected:  1. Mobility  2. Self Care  3. Domestic Life  4. Interpersonal Interactions and Relationships  5. Community, Social and [place]   Number of elements that affect the Plan of Care: 4+: HIGH COMPLEXITY   CLINICAL PRESENTATION:   Presentation: Evolving clinical presentation with changing clinical characteristics: MODERATE COMPLEXITY   CLINICAL DECISION MAKIN [facility] Mobility Inpatient Short Form  How much difficulty does the patient currently have. .. Unable A Lot A Little None   1. Turning over in bed (including adjusting bedclothes, sheets and blankets)? [ ] 1   [X] 2   [ ] 3   [ ] 4   2. Sitting down on and standing up from a chair with arms ( e.g., wheelchair, bedside commode, etc.)   [X] 1   [ ] 2   [ ] 3   [ ] 4   3. Moving from lying on back to sitting on the side of the bed? [ ] 1   [X] 2   [ ] 3   [ ] 4   How much help from another person does the patient currently need. .. Total A Lot A Little None   4. Moving to and from a bed to a chair (including a wheelchair)? [X] 1   [ ] 2   [ ] 3   [ ] 4   5. Need to walk in hospital room? [X] 1   [ ] 2   [ ] 3   [ ] 4   6. Climbing 3-5 steps with a railing? [X] 1   [ ] 2   [ ] 3   [ ] 4   © , Trustees of [address] [place] [address] [address] [id number], under license to [employer]. All rights reserved    Score:  Initial: 8 Most Recent: X (Date: -- )     Interpretation of Tool:  Represents activities that are increasingly more difficult (i.e. Bed mobility, Transfers, Gait).        Score 24 23 22-20 19-15 14-10 9-7 6       Modifier CH CI CJ CK CL CM CN         · Mobility - Walking and Moving Around:               - CURRENT STATUS:    CM - 80%-99% impaired, limited or restricted               - GOAL STATUS:           CL - 60%-79% impaired, limited or restricted               - D/C STATUS:                       ---------------To be determined---------------  [place] / [place] / Product Type: Medicare /       Medical Necessity:     · Patient demonstrates good rehab potential due to higher previous functional level. Reason for Services/Other Comments:  · Patient continues to require modification of therapeutic interventions to increase complexity of exercises. Use of outcome tool(s) and clinical judgement create a POC that gives a: Questionable prediction of patient's progress: MODERATE COMPLEXITY                 TREATMENT:   (In addition to Assessment/Re-Assessment sessions the following treatments were rendered)   Pre-treatment Symptoms/Complaints:  \"I'm sorry. I can't do anymore\"  Pain: Initial:   Pain Intensity 1: 2  Pain Location 1: Arm, Leg  Pain Orientation 1: Left, Right  Pain Intervention(s) 1: Repositioned  Post Session:  Continued pain B LE pain      Therapeutic Activity: (  15 Minutes ):  Therapeutic activities including Bed mobility (sit to supine), seated balance and positioning/scooting, sit-stand trials x2 to improve mobility, strength, balance, coordination and activity tolerance. Required maximal   assistance of 2 and max verbal/manual cues to promote static balance in standing and promote coordination of bilateral, lower extremity(s).       Date:  3/27/17 Date:   Date:     Activity/Exercise Parameters Parameters Parameters   AAROM L heel slides 1x8     AAROM L hip abduction 1x8     Ankle pumps 1x10                                    Braces/Orthotics/Lines/Etc:   · IV  · magana catheter  · O2 Device: Room air  Treatment/Session Assessment:    · Response to Treatment:  No medical complications but increased pain during activity  · Interdisciplinary Collaboration:  · Physical Therapist and Registered Nurse  · After treatment position/precautions:  · Supine in bed, Bed/Chair-wheels locked, Bed in low position and Call light within reach  · Compliance with Program/Exercises: Will assess as treatment progresses. · Recommendations/Intent for next treatment session: \"Next visit will focus on advancements to more challenging activities and reduction in assistance provided\".   Total Treatment Duration:  PT Patient Time In/Time Out  Time In: 0945  Time Out: 100 [facility]

## 2017-03-29 NOTE — PROGRESS NOTES
PM&R Consult Progress Note      Patient: Jennifer Peraza  Admit Date: 3/21/2017  Admit Diagnosis: DVT, lower extremity, proximal, acute, bilateral ([facility] [address]  Recommendations: Continue Acute Rehab Program, Coordination of rehab/medical care, Counseling of PM & R care issues management, Subacute Rehab  -progress not made, although, he at least participated today  -encouraged pt to participate  -less tearful  History/Subjective/Complaint:     Pain 5/10 at rest. Says his back and buttocks burn as well as his upper legs. No cp or sob    Pain 1  Pain Scale 1: Numeric (0 - 10) (03/29/17 1000)  Pain Intensity 1: 2 (03/29/17 1000)  Patient Stated Pain Goal: 0 (03/29/17 0720)  Pain Reassessment 1: Yes (03/28/17 1230)  Pain Onset 1: acute (03/26/17 1014)  Pain Location 1: Arm;Leg (03/29/17 1000)  Pain Orientation 1: Left;Right (03/29/17 1000)  Pain Description 1: Aching (03/29/17 0720)  Pain Intervention(s) 1: Repositioned (03/29/17 1000)     Objective:     Vitals:  Patient Vitals for the past 8 hrs:   BP Temp Pulse Resp SpO2   03/29/17 1125 131/61 98.1 °F (36.7 °C) 93 16 95 %   03/29/17 0803 130/55 98.6 °F (37 °C) 96 16 94 %      Intake and Output:  03/27 1901 - 03/29 0700  In: 2726 [P.O.:240;  I.V.:1760]  Out: 1700 [Urine:1700]    Allergies   Allergen Reactions    Sulfite Hives     Current Facility-Administered Medications   Medication Dose Route Frequency    warfarin (COUMADIN) tablet 4 mg  4 mg Oral QPM    oxyCODONE ER (OxyCONTIN) tablet 10 mg  10 mg Oral Q12H    ibuprofen (MOTRIN) tablet 400 mg  400 mg Oral Q4H PRN    citalopram (CELEXA) tablet 20 mg  20 mg Oral DAILY    acetaminophen (TYLENOL) tablet 650 mg  650 mg Oral Q6H PRN    famotidine (PEPCID) tablet 20 mg  20 mg Oral BID    polyethylene glycol (MIRALAX) packet 17 g  17 g Oral DAILY    Saccharomyces boulardii (FLORASTOR) capsule 250 mg  250 mg Oral BID    tamsulosin (FLOMAX) capsule 0.4 mg  0.4 mg Oral DAILY    olmesartan (BENICAR) tablet 40 mg  40 mg Oral DAILY    bisacodyl (DULCOLAX) suppository 10 mg  10 mg Rectal DAILY PRN    LORazepam (ATIVAN) tablet 1 mg  1 mg Oral BID PRN    zolpidem (AMBIEN) tablet 5 mg  5 mg Oral QHS PRN    ondansetron (ZOFRAN) injection 4 mg  4 mg IntraVENous Q6H PRN    HYDROcodone-acetaminophen (NORCO) 7.5-325 mg per tablet 1 Tab  1 Tab Oral Q6H PRN    morphine injection 4 mg  4 mg IntraVENous Q4H PRN    hydrALAZINE (APRESOLINE) 20 mg/mL injection 20 mg  20 mg IntraVENous Q6H PRN    sodium chloride (NS) flush 5-10 mL  5-10 mL IntraVENous Q8H    sodium chloride (NS) flush 5-10 mL  5-10 mL IntraVENous PRN    insulin lispro (HUMALOG) injection   SubCUTAneous AC&HS       Physical Exam:  Awake and alert. Less emotionally labile today  CV; rrr no m  LUNGS cta b  ABD obese, soft, ntnd bs+  EXT 3 edema; no weeping, tender throughout  prox hip flexion 1-2-/5    Incision(s)/Wound(s):              Functional Assessment:  Gross Assessment  Strength: Generally decreased, functional (03/24/17 1500)  Coordination: Generally decreased, functional (03/24/17 1500)           Bed Mobility  Rolling: Maximum assistance (03/27/17 1200)  Supine to Sit: Maximum assistance;Assist x2 (03/27/17 1200)  Sit to Supine: Maximum assistance;Assist x2 (03/29/17 1000)  Scooting: Total assistance (03/29/17 1000)     Balance  Sitting: Impaired (03/29/17 1000)  Sitting - Static: Good (unsupported) (03/29/17 1000)  Sitting - Dynamic: Fair (occasional) (03/29/17 1000)  Standing: Impaired (03/29/17 1000)  Standing - Static: Poor (03/29/17 1000)  Standing - Dynamic : Poor (03/29/17 1000)                       Bed/Mat Mobility  Rolling: Maximum assistance (03/27/17 1200)  Supine to Sit: Maximum assistance;Assist x2 (03/27/17 1200)  Sit to Supine: Maximum assistance;Assist x2 (03/29/17 1000)  Sit to Stand: Maximum assistance;Assist x2 (03/29/17 1000)  Scooting:  Total assistance (03/29/17 1000)     Labs/Studies:  Recent Results (from the past 72 hour(s))   TYPE & CROSSMATCH Collection Time: 03/26/17  4:15 PM   Result Value Ref Range    Crossmatch Expiration 03/29/2017     ABO/Rh(D) O NEGATIVE     Antibody screen NEG     Unit number [id number]     Blood component type RC LR AS5     Unit division 00     Status of unit TRANSFUSED     Crossmatch result Compatible     Unit number [id number]     Blood component type RC LR AS5     Unit division 00     Status of unit TRANSFUSED     Crossmatch result Compatible     Unit number [id number]     Blood component type RC LR AS5     Unit division 00     Status of unit TRANSFUSED     Crossmatch result Compatible    PTT    Collection Time: 03/26/17  4:15 PM   Result Value Ref Range    aPTT 59.5 (H) 23.5 - 31.7 SEC   GLUCOSE, POC    Collection Time: 03/26/17  4:42 PM   Result Value Ref Range    Glucose (POC) 132 (H) 65 - 100 mg/dL   TRANSFUSION REACTION    Collection Time: 03/26/17  8:05 PM   Result Value Ref Range    Unit Number [id number]     Clerical Errors None detected     Pre-txfusion hemolysis: NONE     Post-txfusion hemolysis: NONE     Blood bag hemolysis: NONE     Direct Chan,pre-txfusion Not required     Direct Chan,post-txfusion NEG     PATHOLOGIST INTERP: PENDING     Component Type RC LR AS5     Blood type,post-txn O  NEG      GLUCOSE, POC    Collection Time: 03/26/17  8:27 PM   Result Value Ref Range    Glucose (POC) 142 (H) 65 - 100 mg/dL   PTT    Collection Time: 03/26/17 10:16 PM   Result Value Ref Range    aPTT 37.8 (H) 23.5 - 31.7 SEC   PROTHROMBIN TIME + INR    Collection Time: 03/27/17  6:00 AM   Result Value Ref Range    Prothrombin time 18.8 (H) 9.6 - 12.0 sec    INR 1.7 (H) 0.9 - 1.2     PTT    Collection Time: 03/27/17  6:00 AM   Result Value Ref Range    aPTT 77.6 (H) 23.5 - 31.7 SEC   GLUCOSE, POC    Collection Time: 03/27/17  7:24 AM   Result Value Ref Range    Glucose (POC) 126 (H) 65 - 100 mg/dL   CBC WITH AUTOMATED DIFF    Collection Time: 03/27/17  8:10 AM   Result Value Ref Range    WBC 8.9 4.3 - 11.1 K/uL    RBC 2.07 (L) 4.23 - 5.67 M/uL    HGB 6.3 (LL) 13.6 - 17.2 g/dL    HCT 20.1 (LL) 41.1 - 50.3 %    MCV 97.1 79.6 - 97.8 FL    MCH 30.4 26.1 - 32.9 PG    MCHC 31.3 (L) 31.4 - 35.0 g/dL    RDW 18.6 (H) 11.9 - 14.6 %    PLATELET 223 197 - 392 K/uL    MPV 9.9 (L) 10.8 - 14.1 FL    NEUTROPHILS 68 43 - 78 %    LYMPHOCYTES 20 13 - 44 %    MONOCYTES 10 4.0 - 12.0 %    EOSINOPHILS 2 0.5 - 7.8 %    BASOPHILS 0 0.0 - 2.0 %    ABS. NEUTROPHILS 6.1 1.7 - 8.2 K/UL    ABS. LYMPHOCYTES 1.8 0.5 - 4.6 K/UL    ABS. MONOCYTES 0.9 0.1 - 1.3 K/UL    ABS. EOSINOPHILS 0.2 0.0 - 0.8 K/UL    ABS. BASOPHILS 0.0 0.0 - 0.2 K/UL    ABS. IMM.  GRANS. 0.1 0.0 - 0.5 K/UL    RBC COMMENTS SLIGHT  ANISOCYTOSIS        WBC COMMENTS Result Confirmed By Smear      PLATELET COMMENTS ADEQUATE      DF MANUAL     METABOLIC PANEL, BASIC    Collection Time: 03/27/17  8:10 AM   Result Value Ref Range    Sodium 140 136 - 145 mmol/L    Potassium 3.8 3.5 - 5.1 mmol/L    Chloride 105 98 - 107 mmol/L    CO2 24 21 - 32 mmol/L    Anion gap 11 7 - 16 mmol/L    Glucose 118 (H) 65 - 100 mg/dL    BUN 10 8 - 23 MG/DL    Creatinine 1.04 0.8 - 1.5 MG/DL    GFR est AA >60 >60 ml/min/1.73m2    GFR est non-AA >60 >60 ml/min/1.73m2    Calcium 7.7 (L) 8.3 - 10.4 MG/DL   GLUCOSE, POC    Collection Time: 03/27/17 12:35 PM   Result Value Ref Range    Glucose (POC) 126 (H) 65 - 100 mg/dL   PLEASE READ & DOCUMENT PPD TEST IN 72 HRS    Collection Time: 03/27/17  3:10 PM   Result Value Ref Range    PPD  Negative    mm Induration  0 mm   PTT    Collection Time: 03/27/17  5:01 PM   Result Value Ref Range    aPTT 31.4 23.5 - 31.7 SEC   GLUCOSE, POC    Collection Time: 03/27/17  5:23 PM   Result Value Ref Range    Glucose (POC) 110 (H) 65 - 100 mg/dL   GLUCOSE, POC    Collection Time: 03/27/17  7:48 PM   Result Value Ref Range    Glucose (POC) 125 (H) 65 - 100 mg/dL   FERRITIN    Collection Time: 03/27/17 10:25 PM   Result Value Ref Range    Ferritin 464 (H) 8 - 388 NG/ML   TRANSFERRIN SATURATION    Collection Time: 03/27/17 10:25 PM   Result Value Ref Range    Iron 21 (L) 35 - 150 ug/dL    TIBC 166 (L) 250 - 450 ug/dL    Transferrin Saturation 13 (L) >20 %   VITAMIN B12    Collection Time: 03/27/17 10:25 PM   Result Value Ref Range    Vitamin B12 514 193 - 986 pg/mL   FOLATE    Collection Time: 03/27/17 10:25 PM   Result Value Ref Range    Folate 6.9 3.1 - 17.5 ng/mL   PTT    Collection Time: 03/27/17 11:55 PM   Result Value Ref Range    aPTT 89.9 (H) 23.5 - 31.7 SEC   GLUCOSE, POC    Collection Time: 03/28/17  6:42 AM   Result Value Ref Range    Glucose (POC) 116 (H) 65 - 100 mg/dL   PROTHROMBIN TIME + INR    Collection Time: 03/28/17  7:35 AM   Result Value Ref Range    Prothrombin time 21.4 (H) 9.6 - 12.0 sec    INR 2.0 (H) 0.9 - 1.2     PTT    Collection Time: 03/28/17  7:35 AM   Result Value Ref Range    aPTT >110.0 (HH) 23.5 - 31.7 SEC   HEMOGLOBIN    Collection Time: 03/28/17  7:35 AM   Result Value Ref Range    HGB 9.1 (L) 13.6 - 17.2 g/dL   RETICULOCYTE COUNT    Collection Time: 03/28/17  7:35 AM   Result Value Ref Range    Reticulocyte count 6.2 (H) 0.3 - 2.0 %    Absolute Retic Cnt. 0.1884 (H) 0.026 - 0.095 M/ul    Immature Retic Fraction 29.0 (H) 2.3 - 13.4 %    Retic Hgb Conc. 28 (L) 29 - 35 pg   GLUCOSE, POC    Collection Time: 03/28/17 11:41 AM   Result Value Ref Range    Glucose (POC) 112 (H) 65 - 100 mg/dL   GLUCOSE, POC    Collection Time: 03/28/17  4:07 PM   Result Value Ref Range    Glucose (POC) 121 (H) 65 - 100 mg/dL   GLUCOSE, POC    Collection Time: 03/28/17  8:07 PM   Result Value Ref Range    Glucose (POC) 125 (H) 65 - 100 mg/dL   PTT, CRRT PROTOCOL (PTT DRIP)    Collection Time: 03/28/17  9:00 PM   Result Value Ref Range    PTT, CRRT PROTOCOL >110.0 (HH) 23.5 - 31.7 SEC   PROTHROMBIN TIME + INR    Collection Time: 03/29/17  6:08 AM   Result Value Ref Range    Prothrombin time 24.4 (H) 9.6 - 12.0 sec    INR 2.2 (H) 0.9 - 1.2     PTT    Collection Time: 03/29/17  6:08 AM Result Value Ref Range    aPTT 69.2 (H) 23.5 - 31.7 SEC   CBC WITH AUTOMATED DIFF    Collection Time: 03/29/17  6:08 AM   Result Value Ref Range    WBC 11.8 (H) 4.3 - 11.1 K/uL    RBC 2.94 (L) 4.23 - 5.67 M/uL    HGB 8.8 (L) 13.6 - 17.2 g/dL    HCT 27.9 (L) 41.1 - 50.3 %    MCV 94.9 79.6 - 97.8 FL    MCH 29.9 26.1 - 32.9 PG    MCHC 31.5 31.4 - 35.0 g/dL    RDW 18.0 (H) 11.9 - 14.6 %    PLATELET 197 206 - 564 K/uL    MPV 10.5 (L) 10.8 - 14.1 FL    DF PENDING    GLUCOSE, POC    Collection Time: 03/29/17  7:57 AM   Result Value Ref Range    Glucose (POC) 106 (H) 65 - 100 mg/dL   GLUCOSE, POC    Collection Time: 03/29/17 11:23 AM   Result Value Ref Range    Glucose (POC) 129 (H) 65 - 100 mg/dL        Assessment:     Principal Problem:    DVT, lower extremity, proximal, acute ([address] (3/21/2017)    Active Problems:    Type 2 diabetes mellitus without complication ([address] (0/80/9249)      Benign non-nodular prostatic hyperplasia without lower urinary tract symptoms (7/22/2016)      Hypertriglyceridemia (7/22/2016)      Depression (7/22/2016)      Obstructive sleep apnea syndrome (7/22/2016)      Overview: Home CPAP      Morbid obesity due to excess calories ([address] (7/22/2016)      Primary insomnia (7/22/2016)      Benign essential HTN (9/13/2016)        Plan:   67 yo s/p fall with intractable back pain, complicated by bilateral LE DVTs, anemia, immobility  Recommendations: Continue Acute Rehab Program  Coordination of rehab/medical care  Counseling of PM & R care issues management  Monitoring and management of medical conditions per plan of care/orders   -currently too impaired for [facility]  - I started Celexa yesterday for situational depression. Pt responds to encouragement  - agree to [facility] ER; will add luz marina 10 q 6hrs scheduled; my hope is that his mobililty and participation will improve if pain is better controlled.  Will add Neurontin 100 qhs for burning pain/neuropathic in LEs; will titrate as needed and as tolerated  -I will continue to follow until medically stable for rehab and decide at that time whether pt ready to participate in [facility] level of care vs. SNF.   Discussion with pt/Staff  Reviewed Therapies/Labs/Medications/Records    Signed By:  Silas Payne MD     March 29, 2017

## 2017-03-29 NOTE — PROGRESS NOTES
Inpatient Hematology / Oncology Progress Note      Admission Date: 3/21/2017  7:10 PM  Reason for Admission/Hospital Course: DVT, lower extremity, proximal, acute, bilateral (HCC)      24 Hour Events:  Feeling a little better today  Continues with BLE pain  No obvious signs of blood          ROS:  Constitutional: Positive for fatigue and weakness; negative for fever, chills. CV: Negative for chest pain, palpitations, edema. Respiratory: Negative for dyspnea, cough, wheezing. GI: Negative for nausea, abdominal pain, diarrhea. 10 point review of systems is otherwise negative with the exception of the elements mentioned above in the HPI. Allergies   Allergen Reactions    Sulfite Hives       OBJECTIVE:  Patient Vitals for the past 8 hrs:   BP Temp Pulse Resp SpO2   17 1125 131/61 98.1 °F (36.7 °C) 93 16 95 %   17 0803 130/55 98.6 °F (37 °C) 96 16 94 %     Temp (24hrs), Av.2 °F (37.3 °C), Min:98.1 °F (36.7 °C), Max:101 °F (38.3 °C)         Physical Exam:  Constitutional: Well developed, well nourished male in no acute distress, lying comfortably in the hospital bed. HEENT: Normocephalic and atraumatic. Oropharynx is clear, mucous membranes are moist. Sclerae anicteric. Neck supple without JVD. No thyromegaly present. Skin Warm and dry. No bruising and no rash noted. No erythema. No pallor. Respiratory Lungs are clear to auscultation bilaterally without wheezes, rales or rhonchi, normal air exchange without accessory muscle use. CVS Normal rate, regular rhythm and normal S1 and S2. No murmurs, gallops, or rubs. Abdomen Soft, nontender and nondistended, normoactive bowel sounds. No palpable mass. Neuro Grossly nonfocal with no obvious sensory or motor deficits. MSK Normal range of motion in general.  BLE edema with tenderness. Psych Appropriate mood and affect.         Labs:    Recent Labs      17   0608  17   0735  17   0810   WBC  11.8*   -- 8. 9   RBC  2.94*   --   2.07*   HGB  8.8*  9.1*  6.3*   HCT  27.9*   --   20.1*   MCV  94.9   --   97.1   MCH  29.9   --   30.4   MCHC  31.5   --   31.3*   RDW  18.0*   --   18.6*   PLT  291   --   217   GRANS  69   --   68   LYMPH  18   --   20   MONOS  10   --   10   EOS  2   --   2   BASOS  1   --   0   DF  MANUAL   --   MANUAL   ANEU  8.1   --   6.1   ABL  2.1   --   1.8   ABM  1.2   --   0.9   ANNIE  0.2   --   0.2   ABB  0.1   --   0.0   AIG  0.1   --   0.1      Recent Labs      03/29/17   0608  03/27/17   0810   NA  140  140   K  3.8  3.8   CL  103  105   CO2  25  24   AGAP  12  11   GLU  83  118*   BUN  12  10   CREA  0.95  1.04   GFRAA  >60  >60   GFRNA  >60  >60   CA  8.0*  7.7*   SGOT  18   --    AP  91   --    TP  5.8*   --    ALB  1.9*   --    GLOB  3.9*   --    AGRAT  0.5*   --          Imaging:      ASSESSMENT:    Problem List  Date Reviewed: 3/21/2017          Codes Class Noted    * (Principal)DVT, lower extremity, proximal, acute ([facility] ICD-10-CM: I82.4Y9  ICD-9-CM: 453.41  3/21/2017        Benign essential HTN ICD-10-CM: I10  ICD-9-CM: 401.1  9/13/2016        Type 2 diabetes mellitus without complication ([facility] OWZ-[id number]: E11.9  ICD-9-CM: 250.00  7/22/2016        Benign non-nodular prostatic hyperplasia without lower urinary tract symptoms ICD-10-CM: N40.0  ICD-9-CM: 600.90  7/22/2016        Hypertriglyceridemia ICD-10-CM: E78.1  ICD-9-CM: 272.1  7/22/2016        Corporo-venous occlusive erectile dysfunction ICD-10-CM: N52.02  ICD-9-CM: 607.84  7/22/2016        Depression ICD-10-CM: F32.9  ICD-9-CM: 311  7/22/2016        Obstructive sleep apnea syndrome ICD-10-CM: G47.33  ICD-9-CM: 327.23  7/22/2016    Overview Signed 3/21/2017 10:40 PM by BIJAN Sage     Home CPAP             Morbid obesity due to excess calories ([facility] ICD-10-CM: E66.01  ICD-9-CM: 278.01  7/22/2016        Primary insomnia ICD-10-CM: F51.01  ICD-9-CM: 307.42  7/22/2016                PLAN:    -Acute Anemia  Check Haptoglobin, Iron studies, Vitamin B12, Folate, Retic Count, Guiac-stool  We also ordered a xray of his left FA due to his pain that he states continues since his fall, denies previous imaging to that area.    3/29: will obtain abdominal CT to assess for any bleeding for reason of anemia, B12 and folate are normal, retic count is elevated at 6.2, which we find to be a positive sign of increased production, Haptoglobin is normal at 128, Omega Caulk has yet to be obtained              Ivania Emanuel NP   [address]  Office : [phone]  Fax : [phone]

## 2017-03-29 NOTE — PROGRESS NOTES
Warfarin dosing per pharmacist    Collette Marcos is a 66 y.o. male. Height: 5' 6\" (167.6 cm)    Weight: 126.2 kg (278 lb 4.8 oz)    Indication:  Bilateral LE DVT, thrombosed IVC    Goal INR:  2-3    Home dose:  New start    Risk factors or significant drug interactions: Other anticoagulants:  Heparin bridge - Stopped 3/29/17    Daily Monitoring  Date  INR     Warfarin dose HGB              Notes  3/24  1.8  5 mg  9.4  3/25  1.1  5 mg   ---  3/26  1.2  5 mg  7.1  3/27  1.7  4 mg  ---  3/28  2.0  4 mg  9.1  3/29  2.2  4 mg  ---    Pharmacy consulted to dose coumadin on 3/24. Pt s/p EKOS and started on coumadin bridged with heparin. INR remains therapeutic today. Heparin drip discontinued. As the patient has been on Questran and this is being stopped today, the slight possibility exists that the INR could increase if the Questran was binding any of the warfarin. Will continue with warfarin 4 mg QPM.    Will continue to check INR daily for now in order to determine if INR will increase as a result of stopping Questran, although this seems unlikely to be an issue given the trend in INR so far. If the patient is discharged soon, would recommend following up to have INR checked in a few days.     Thank you,  Lana Meier, PharmD  Clinical Pharmacist  [phone]

## 2017-03-29 NOTE — PROGRESS NOTES
Left forearm distal from IV site with redness, heat and edema. Pt having severe pain in this area. IV site d/c'd. Red arm band applied. Arm elevated on pillow and ice pack applied. Md is aware of the pain in this arm.

## 2017-03-29 NOTE — PROGRESS NOTES
Problem: Self Care Deficits Care Plan (Adult)  Goal: *Acute Goals and Plan of Care (Insert Text)  1. Patient will complete lower body bathing and dressing with moderate assistance and adaptive equipment as needed. 2. Patient will complete toileting with moderate assistance. 3. Patient will tolerate 20 minutes of OT treatment with 2 rest breaks to increase activity tolerance for ADLs. 4. Patient will complete functional transfers with moderate assistance and adaptive equipment as needed. 5. Patient will tolerate sitting edge of bed for 5 minutes with modified independence for ADLs. 6. Patient will complete grooming with setup assistance and adaptive equipment as needed. Timeframe: 7 visits       OCCUPATIONAL THERAPY: Initial Assessment 3/29/2017  INPATIENT: Hospital Day: 9  [place] / Product Type: Medicare /      NAME/AGE/GENDER: Sharda Evans is a 66 y.o. male            PRIMARY DIAGNOSIS:  DVT, lower extremity, proximal, acute, bilateral (HCC) DVT, lower extremity, proximal, acute ([address] DVT, lower extremity, proximal, acute ([address]        ICD-10: Treatment Diagnosis:        · Generalized Muscle Weakness (M62.81)  · Other lack of cordination (R27.8)   Precautions/Allergies:         Sulfite       ASSESSMENT:      Mr. Larry Kumar presents supine in bed, agreeable to OT evaluation though requires maximal encouragement to participate. Pt is oriented x 4. He presents with BLE and LUE edema, minimal grasp achieved with left hand. Pt requires maximal assistance for bed mobility and total assistance to don socks. Pt c/o increased pain with movement, requires additional time to complete any mobility, and requires verbal cueing to remain on task. Pt states he is aware of toileting needs but chooses to use his brief and \"get cleaned up\" than attempt BSC or bed edgar.   Mr. Larry Kumar presents with decreased activity tolerance, and decreased independence for self care, functional mobility, and ADL's. Requires skilled OT to maximize independence with self care tasks and functional transfers. Completed OT evaluation and assisted PT with pt's return to supine with all needs in reach, pt instructed to call for assistance; RN aware. Pt is appropriate for co-tx with PT. This section established at most recent assessment   PROBLEM LIST (Impairments causing functional limitations):  1. Decreased Strength  2. Decreased ADL/Functional Activities  3. Decreased Transfer Abilities  4. Decreased Ambulation Ability/Technique  5. Decreased Balance  6. Increased Pain  7. Decreased Activity Tolerance  8. Decreased Work Simplification/Energy Conservation Techniques  9. Increased Fatigue  10. Decreased Flexibility/Joint Mobility  11. Decreased Skin Integrity/Hygeine  12. Decreased Olin with Home Exercise Program    INTERVENTIONS PLANNED: (Benefits and precautions of occupational therapy have been discussed with the patient.)  1. Activities of daily living training  2. Adaptive equipment training  3. Balance training  4. Clothing management  5. Donning&doffing training  6. Group therapy  7. Hygiene training  8. Theraputic activity  9. Theraputic exercise      TREATMENT PLAN: Frequency/Duration: Follow patient 3x/week to address above goals. Rehabilitation Potential For Stated Goals: GOOD      RECOMMENDED REHABILITATION/EQUIPMENT: (at time of discharge pending progress): Continue Skilled Therapy. OCCUPATIONAL PROFILE AND HISTORY:   History of Present Injury/Illness (Reason for Referral):  Ochoa Lockhart is a 66 y.o. male that presented to the ED with 2 week history of worsening swelling of the bilateral lower extremities with increasing difficulty with ambulation. He had OP US today showing extensive DVT. He is currently undergoing rehab at [facility] following admission at [facility] for JIM requiring 2 runs of HD.  He has history of DVT in 2013 and had IVC filter placed at that time. Patient denies dyspnea or chest pain. The hospitalist have been asked to admit. Past Medical History/Comorbidities:   Mr. Pretty Dubon  has a past medical history of Calculus of kidney; Diabetes ([facility] and DVT (deep venous thrombosis) ([facility] (2013). Mr. Pretty Dubon  has a past surgical history that includes urological.  Social History/Living Environment:   Home Environment: Skilled nursing facility  One/Two Story Residence: One story  Living Alone: No  Support Systems: Spouse/Significant Other/Partner  Patient Expects to be Discharged to[de-identified] Private residence  Current DME Used/Available at Home: None  Tub or Shower Type: Tub/Shower combination  Prior Level of Function/Work/Activity:  Mostly independent      Number of Personal Factors/Comorbidities that affect the Plan of Care: Expanded review of therapy/medical records (1-2):  MODERATE COMPLEXITY   ASSESSMENT OF OCCUPATIONAL PERFORMANCE[de-identified]   Activities of Daily Living:           Basic ADLs (From Assessment) Complex ADLs (From Assessment)   Basic ADL  Feeding: Minimum assistance  Oral Facial Hygiene/Grooming: Minimum assistance  Bathing: Maximum assistance  Upper Body Dressing: Contact guard assistance  Lower Body Dressing: Maximum assistance  Toileting: Maximum assistance     Grooming/Bathing/Dressing Activities of Daily Living     Cognitive Retraining  Safety/Judgement: Decreased insight into deficits; Fall prevention                 Functional Transfers  Toilet Transfer : Other (comment) (unable at this time)  Shower Transfer: Total assistance   Lower Body Dressing Assistance  Dressing Assistance: Maximum assistance; Total assistance(dependent)  Socks: Total assistance (dependent)  Leg Crossed Method Used: Yes  Position Performed: Seated edge of bed  Cues: Tactile cues provided;Verbal cues provided;Visual cues provided Bed/Mat Mobility  Rolling: Maximum assistance  Supine to Sit: Maximum assistance;Assist x2; Additional time  Sit to Supine: Maximum assistance;Assist x2; Additional time  Sit to Stand: Maximum assistance;Assist x2  Bed to Chair:  (unable at this time)  Scooting: Total assistance          Most Recent Physical Functioning:   Gross Assessment:  AROM:  (LUE limited, some function, edema: RUE decreased, functional)  Strength:  (LUE limited, some function, edema: RUE decreased, functional)  Coordination: Generally decreased, functional               Posture:  Posture (WDL): Exceptions to WDL  Posture Assessment: Forward head, Rounded shoulders  Balance:  Sitting: Impaired  Sitting - Static: Good (unsupported)  Sitting - Dynamic: Fair (occasional)  Standing: Impaired  Standing - Static: Poor  Standing - Dynamic : Poor Bed Mobility:  Rolling: Maximum assistance  Supine to Sit: Maximum assistance;Assist x2; Additional time  Sit to Supine: Maximum assistance;Assist x2; Additional time  Scooting: Total assistance  Wheelchair Mobility:     Transfers:  Sit to Stand: Maximum assistance;Assist x2  Stand to Sit: Moderate assistance  Bed to Chair:  (unable at this time)  Interventions: Verbal cues; Visual cues; Safety awareness training; Tactile cues;Manual cues  Duration: 15 Minutes                 Patient Vitals for the past 6 hrs:       BP BP Patient Position SpO2 Pulse   03/29/17 0803 130/55 At rest 94 % 96   03/29/17 1125 131/61 At rest 95 % 93        Mental Status  Neurologic State: Alert  Orientation Level: Oriented X4  Cognition: Decreased attention/concentration, Decreased command following  Perception: Appears intact  Perseveration: Perseverates during mobility  Safety/Judgement: Decreased insight into deficits, Fall prevention                               Physical Skills Involved:  1. Range of Motion  2. Balance  3. Mobility  4. Strength  5. Endurance  6. Fine or Gross Motor Coordination Cognitive Skills Affected (resulting in the inability to perform in a timely and safe manner):  1. Attending  2.  Problem Solving Psychosocial Skills Affected:  1. Interpersonal Interactions  2. Habits  3. Routines and Behaviors  4. Active Use of Coping Strategies  5. Environmental Adaptations   Number of elements that affect the Plan of Care: 5+:  HIGH COMPLEXITY   CLINICAL DECISION MAKIN [facility] Mobility Inpatient Short Form  How much help from another person does the patient currently need. .. Total A Lot A Little None   1. Putting on and taking off regular lower body clothing? [X] 1   [ ] 2   [ ] 3   [ ] 4   2. Bathing (including washing, rinsing, drying)? [ ] 1   [X] 2   [ ] 3   [ ] 4   3. Toileting, which includes using toilet, bedpan or urinal?   [X] 1   [ ] 2   [ ] 3   [ ] 4   4. Putting on and taking off regular upper body clothing?   [ ] 1   [X] 2   [ ] 3   [ ] 4   5. Taking care of personal grooming such as brushing teeth? [ ] 1   [ ] 2   [X] 3   [ ] 4   6. Eating meals? [ ] 1   [ ] 2   [X] 3   [ ] 4   © , Trustees of [address] [place], [place] [address], under license to [employer]. All rights reserved    Score:  Initial: 12 Most Recent: X (Date: -- )     Interpretation of Tool:  Represents activities that are increasingly more difficult (i.e. Bed mobility, Transfers, Gait). Score 24 23 22-20 19-15 14-10 9-7 6       Modifier CH CI CJ CK CL CM CN         · Self Care:               - CURRENT STATUS:    CL - 60%-79% impaired, limited or restricted               - GOAL STATUS:           CK - 40%-59% impaired, limited or restricted               - D/C STATUS:                       ---------------To be determined---------------  [place] / Product Type: Medicare /       Medical Necessity:     · Patient demonstrates fair rehab potential due to higher previous functional level. Reason for Services/Other Comments:  · Patient continues to require skilled intervention due to patient unable to attend/participate in therapy as expected.    Use of outcome tool(s) and clinical judgement create a POC that gives a: MODERATE COMPLEXITY             TREATMENT:   (In addition to Assessment/Re-Assessment sessions the following treatments were rendered)      Pre-treatment Symptoms/Complaints:  Decreased ability to perform ADLs, self care, and functional mobility; decreased tolerance for activities  Pain: Initial:   Pain Intensity 1: 2  Pain Location 1: Arm, Leg  Pain Intervention(s) 1: Repositioned, Emotional support  Post Session:  2/10      Assessment/Reassessment only, no treatment provided today     Braces/Orthotics/Lines/Etc:   · IV  · magana catheter  · O2 Device: Room air  Treatment/Session Assessment:    · Response to Treatment:  Agrees to therapy  · Interdisciplinary Collaboration:  · Physical Therapist  · Occupational Therapist  · Registered Nurse  · After treatment position/precautions:  · Supine in bed  · Bed/Chair-wheels locked  · Call light within reach  · RN notified  · Compliance with Program/Exercises: Will assess as treatment progresses. · Recommendations/Intent for next treatment session: \"Next visit will focus on advancements to more challenging activities and reduction in assistance provided\".   Total Treatment Duration:  OT Patient Time In/Time Out  Time In: 0900  Time Out: Ya Tomlin 97, OTR/L

## 2017-03-30 LAB
ALBUMIN SERPL BCP-MCNC: 1.9 G/DL (ref 3.2–4.6)
ALBUMIN/GLOB SERPL: 0.5 {RATIO} (ref 1.2–3.5)
ALP SERPL-CCNC: 96 U/L (ref 50–136)
ALT SERPL-CCNC: 13 U/L (ref 12–65)
AMORPH CRY URNS QL MICRO: ABNORMAL
ANION GAP BLD CALC-SCNC: 7 MMOL/L (ref 7–16)
APPEARANCE UR: ABNORMAL
AST SERPL W P-5'-P-CCNC: 20 U/L (ref 15–37)
BACTERIA URNS QL MICRO: ABNORMAL /HPF
BASOPHILS # BLD AUTO: 0 K/UL (ref 0–0.2)
BASOPHILS # BLD: 0 % (ref 0–2)
BILIRUB SERPL-MCNC: 0.6 MG/DL (ref 0.2–1.1)
BILIRUB UR QL: NEGATIVE
BUN SERPL-MCNC: 15 MG/DL (ref 8–23)
CALCIUM SERPL-MCNC: 8.4 MG/DL (ref 8.3–10.4)
CASTS URNS QL MICRO: ABNORMAL /LPF
CHLORIDE SERPL-SCNC: 105 MMOL/L (ref 98–107)
CO2 SERPL-SCNC: 28 MMOL/L (ref 21–32)
COLOR UR: ABNORMAL
CREAT SERPL-MCNC: 0.91 MG/DL (ref 0.8–1.5)
DIFFERENTIAL METHOD BLD: ABNORMAL
EOSINOPHIL # BLD: 0.1 K/UL (ref 0–0.8)
EOSINOPHIL NFR BLD: 1 % (ref 0.5–7.8)
ERYTHROCYTE [DISTWIDTH] IN BLOOD BY AUTOMATED COUNT: 17.5 % (ref 11.9–14.6)
GLOBULIN SER CALC-MCNC: 3.7 G/DL (ref 2.3–3.5)
GLUCOSE BLD STRIP.AUTO-MCNC: 108 MG/DL (ref 65–100)
GLUCOSE BLD STRIP.AUTO-MCNC: 109 MG/DL (ref 65–100)
GLUCOSE BLD STRIP.AUTO-MCNC: 119 MG/DL (ref 65–100)
GLUCOSE BLD STRIP.AUTO-MCNC: 136 MG/DL (ref 65–100)
GLUCOSE SERPL-MCNC: 103 MG/DL (ref 65–100)
GLUCOSE UR STRIP.AUTO-MCNC: NEGATIVE MG/DL
HCT VFR BLD AUTO: 26.6 % (ref 41.1–50.3)
HGB BLD-MCNC: 8.4 G/DL (ref 13.6–17.2)
HGB UR QL STRIP: ABNORMAL
IMM GRANULOCYTES # BLD: 0.1 K/UL (ref 0–0.5)
IMM GRANULOCYTES NFR BLD AUTO: 0.9 % (ref 0–5)
INR PPP: 2.5 (ref 0.9–1.2)
KETONES UR QL STRIP.AUTO: NEGATIVE MG/DL
LEUKOCYTE ESTERASE UR QL STRIP.AUTO: ABNORMAL
LYMPHOCYTES # BLD AUTO: 11 % (ref 13–44)
LYMPHOCYTES # BLD: 1.1 K/UL (ref 0.5–4.6)
MCH RBC QN AUTO: 29.6 PG (ref 26.1–32.9)
MCHC RBC AUTO-ENTMCNC: 31.6 G/DL (ref 31.4–35)
MCV RBC AUTO: 93.7 FL (ref 79.6–97.8)
MONOCYTES # BLD: 1 K/UL (ref 0.1–1.3)
MONOCYTES NFR BLD AUTO: 11 % (ref 4–12)
NEUTS SEG # BLD: 7.4 K/UL (ref 1.7–8.2)
NEUTS SEG NFR BLD AUTO: 76 % (ref 43–78)
NITRITE UR QL STRIP.AUTO: POSITIVE
PH UR STRIP: 6 [PH] (ref 5–9)
PLATELET # BLD AUTO: 310 K/UL (ref 150–450)
PMV BLD AUTO: 9.5 FL (ref 10.8–14.1)
POTASSIUM SERPL-SCNC: 4.4 MMOL/L (ref 3.5–5.1)
PROT SERPL-MCNC: 5.6 G/DL (ref 6.3–8.2)
PROT UR STRIP-MCNC: 30 MG/DL
PROTHROMBIN TIME: 27.2 SEC (ref 9.6–12)
RBC # BLD AUTO: 2.84 M/UL (ref 4.23–5.67)
SODIUM SERPL-SCNC: 140 MMOL/L (ref 136–145)
SP GR UR REFRACTOMETRY: 1.02 (ref 1–1.02)
UROBILINOGEN UR QL STRIP.AUTO: 1 EU/DL (ref 0.2–1)
WBC # BLD AUTO: 9.7 K/UL (ref 4.3–11.1)
WBC URNS QL MICRO: >100 /HPF

## 2017-03-30 PROCEDURE — 74011250637 HC RX REV CODE- 250/637: Performed by: PHYSICAL MEDICINE & REHABILITATION

## 2017-03-30 PROCEDURE — 86022 PLATELET ANTIBODIES: CPT | Performed by: INTERNAL MEDICINE

## 2017-03-30 PROCEDURE — 80053 COMPREHEN METABOLIC PANEL: CPT | Performed by: INTERNAL MEDICINE

## 2017-03-30 PROCEDURE — 87186 SC STD MICRODIL/AGAR DIL: CPT | Performed by: INTERNAL MEDICINE

## 2017-03-30 PROCEDURE — 82542 COL CHROMOTOGRAPHY QUAL/QUAN: CPT | Performed by: INTERNAL MEDICINE

## 2017-03-30 PROCEDURE — 74011000258 HC RX REV CODE- 258: Performed by: INTERNAL MEDICINE

## 2017-03-30 PROCEDURE — 99231 SBSQ HOSP IP/OBS SF/LOW 25: CPT | Performed by: PHYSICAL MEDICINE & REHABILITATION

## 2017-03-30 PROCEDURE — 74011250636 HC RX REV CODE- 250/636: Performed by: INTERNAL MEDICINE

## 2017-03-30 PROCEDURE — 85610 PROTHROMBIN TIME: CPT | Performed by: INTERNAL MEDICINE

## 2017-03-30 PROCEDURE — 65270000029 HC RM PRIVATE

## 2017-03-30 PROCEDURE — 74011250637 HC RX REV CODE- 250/637: Performed by: INTERNAL MEDICINE

## 2017-03-30 PROCEDURE — 97530 THERAPEUTIC ACTIVITIES: CPT

## 2017-03-30 PROCEDURE — 87086 URINE CULTURE/COLONY COUNT: CPT | Performed by: INTERNAL MEDICINE

## 2017-03-30 PROCEDURE — 74011250637 HC RX REV CODE- 250/637: Performed by: NURSE PRACTITIONER

## 2017-03-30 PROCEDURE — 82962 GLUCOSE BLOOD TEST: CPT

## 2017-03-30 PROCEDURE — 85025 COMPLETE CBC W/AUTO DIFF WBC: CPT | Performed by: INTERNAL MEDICINE

## 2017-03-30 PROCEDURE — 36415 COLL VENOUS BLD VENIPUNCTURE: CPT | Performed by: INTERNAL MEDICINE

## 2017-03-30 RX ORDER — WARFARIN 2 MG/1
4 TABLET ORAL
Status: DISCONTINUED | OUTPATIENT
Start: 2017-03-30 | End: 2017-04-01

## 2017-03-30 RX ADMIN — TAMSULOSIN HYDROCHLORIDE 0.4 MG: 0.4 CAPSULE ORAL at 11:12

## 2017-03-30 RX ADMIN — OLMESARTAN MEDOXOMIL 40 MG: 40 TABLET, FILM COATED ORAL at 11:12

## 2017-03-30 RX ADMIN — FAMOTIDINE 20 MG: 20 TABLET, FILM COATED ORAL at 11:10

## 2017-03-30 RX ADMIN — OXYCODONE HYDROCHLORIDE 10 MG: 10 TABLET, FILM COATED, EXTENDED RELEASE ORAL at 11:10

## 2017-03-30 RX ADMIN — OXYCODONE HYDROCHLORIDE 10 MG: 5 TABLET ORAL at 17:43

## 2017-03-30 RX ADMIN — CITALOPRAM HYDROBROMIDE 20 MG: 20 TABLET ORAL at 11:13

## 2017-03-30 RX ADMIN — GABAPENTIN 100 MG: 100 CAPSULE ORAL at 23:33

## 2017-03-30 RX ADMIN — Medication 10 ML: at 07:00

## 2017-03-30 RX ADMIN — RDII 250 MG CAPSULE 250 MG: at 11:11

## 2017-03-30 RX ADMIN — FAMOTIDINE 20 MG: 20 TABLET, FILM COATED ORAL at 17:44

## 2017-03-30 RX ADMIN — OXYCODONE HYDROCHLORIDE 10 MG: 5 TABLET ORAL at 06:03

## 2017-03-30 RX ADMIN — Medication 10 ML: at 17:45

## 2017-03-30 RX ADMIN — WARFARIN SODIUM 4 MG: 2 TABLET ORAL at 23:33

## 2017-03-30 RX ADMIN — CEFTRIAXONE 1 G: 1 INJECTION, POWDER, FOR SOLUTION INTRAMUSCULAR; INTRAVENOUS at 17:44

## 2017-03-30 RX ADMIN — OXYCODONE HYDROCHLORIDE 10 MG: 5 TABLET ORAL at 13:41

## 2017-03-30 RX ADMIN — RDII 250 MG CAPSULE 250 MG: at 17:43

## 2017-03-30 NOTE — PROGRESS NOTES
Pharmacy Note: Warfarin Consult    Warfarin has been discontinued due to drop in plt and hct yesterday. Plt today 310. Pharmacy has signed off warfarin dosing. Please re-consult as needed if warfarin is to restart.      Thanks,  Choco Byrne, PharmD  Clinical Pharmacist  [phone]

## 2017-03-30 NOTE — PROGRESS NOTES
PM&R Consult Progress Note      Patient: Jennifer Peraza  Admit Date: 3/21/2017  Admit Diagnosis: DVT, lower extremity, proximal, acute, bilateral ([facility] [address]  Recommendations: Continue Acute Rehab Program, Coordination of rehab/medical care, Counseling of PM & R care issues management, Subacute Rehab    History/Subjective/Complaint:     Very frustrated and upset that no one tells him anything. I explained, in detail, all medical issues that we are looking into.      Pain 1  Pain Scale 1: Numeric (0 - 10) (03/30/17 1110)  Pain Intensity 1: 7 (03/30/17 1110)  Patient Stated Pain Goal: 0 (03/30/17 1110)  Pain Reassessment 1: Yes (03/28/17 1230)  Pain Onset 1:  (acute) (03/30/17 1110)  Pain Location 1:  (legs) (03/30/17 1110)  Pain Orientation 1: Right;Left (03/30/17 1030)  Pain Description 1: Aching;Constant (03/30/17 1110)  Pain Intervention(s) 1: Medication (see MAR) (03/30/17 1110)     Objective:     Vitals:  Patient Vitals for the past 8 hrs:   BP Temp Pulse Resp SpO2   03/30/17 1127 142/68 97.9 °F (36.6 °C) 88 18 97 %   03/30/17 0820 129/85 98.6 °F (37 °C) 89 18 92 %      Intake and Output:  03/28 1901 - 03/30 0700  In: -   Out: 650 [Urine:650]    Allergies   Allergen Reactions    Sulfite Hives     Current Facility-Administered Medications   Medication Dose Route Frequency    oxyCODONE ER (OxyCONTIN) tablet 10 mg  10 mg Oral Q12H    oxyCODONE IR (ROXICODONE) tablet 10 mg  10 mg Oral Q6H    gabapentin (NEURONTIN) capsule 100 mg  100 mg Oral QHS    citalopram (CELEXA) tablet 20 mg  20 mg Oral DAILY    acetaminophen (TYLENOL) tablet 650 mg  650 mg Oral Q6H PRN    famotidine (PEPCID) tablet 20 mg  20 mg Oral BID    polyethylene glycol (MIRALAX) packet 17 g  17 g Oral DAILY    Saccharomyces boulardii (FLORASTOR) capsule 250 mg  250 mg Oral BID    tamsulosin (FLOMAX) capsule 0.4 mg  0.4 mg Oral DAILY    olmesartan (BENICAR) tablet 40 mg  40 mg Oral DAILY    bisacodyl (DULCOLAX) suppository 10 mg  10 mg Rectal DAILY PRN    LORazepam (ATIVAN) tablet 1 mg  1 mg Oral BID PRN    zolpidem (AMBIEN) tablet 5 mg  5 mg Oral QHS PRN    ondansetron (ZOFRAN) injection 4 mg  4 mg IntraVENous Q6H PRN    morphine injection 4 mg  4 mg IntraVENous Q4H PRN    hydrALAZINE (APRESOLINE) 20 mg/mL injection 20 mg  20 mg IntraVENous Q6H PRN    sodium chloride (NS) flush 5-10 mL  5-10 mL IntraVENous Q8H    sodium chloride (NS) flush 5-10 mL  5-10 mL IntraVENous PRN    insulin lispro (HUMALOG) injection   SubCUTAneous AC&HS       Physical Exam:  No significant changes  3+ edema R>L; RLE very sensitive as is L arm  Strength 2- prox robert LEs, wiggles toes. Functional Assessment:  Gross Assessment  AROM:  (LUE limited, some function, edema: RUE decreased, functional) (03/29/17 0900)  Strength:  (LUE limited, some function, edema: RUE decreased, functional) (03/29/17 0900)  Coordination: Generally decreased, functional (03/29/17 0900)           Bed Mobility  Rolling: Maximum assistance (03/30/17 1100)  Supine to Sit: Maximum assistance;Assist x2 (03/30/17 1100)  Sit to Supine: Maximum assistance;Assist x2 (03/30/17 1100)  Scooting: Total assistance (03/30/17 1100)     Balance  Sitting: Impaired (03/30/17 1100)  Sitting - Static: Good (unsupported) (03/30/17 1100)  Sitting - Dynamic: Fair (occasional) (03/30/17 1100)  Standing: Impaired (03/29/17 1000)  Standing - Static: Poor (03/29/17 1000)  Standing - Dynamic : Poor (03/29/17 1000)                       Bed/Mat Mobility  Rolling: Maximum assistance (03/30/17 1100)  Supine to Sit: Maximum assistance;Assist x2 (03/30/17 1100)  Sit to Supine: Maximum assistance;Assist x2 (03/30/17 1100)  Sit to Stand: Maximum assistance;Assist x2 (03/29/17 1000)  Bed to Chair:  (unable at this time) (03/29/17 1001)  Scooting:  Total assistance (03/30/17 1100)     Labs/Studies:  Recent Results (from the past 72 hour(s))   GLUCOSE, POC    Collection Time: 03/27/17 12:35 PM   Result Value Ref Range Glucose (POC) 126 (H) 65 - 100 mg/dL   PLEASE READ & DOCUMENT PPD TEST IN 72 HRS    Collection Time: 03/27/17  3:10 PM   Result Value Ref Range    PPD  Negative    mm Induration  0 mm   PTT    Collection Time: 03/27/17  5:01 PM   Result Value Ref Range    aPTT 31.4 23.5 - 31.7 SEC   GLUCOSE, POC    Collection Time: 03/27/17  5:23 PM   Result Value Ref Range    Glucose (POC) 110 (H) 65 - 100 mg/dL   GLUCOSE, POC    Collection Time: 03/27/17  7:48 PM   Result Value Ref Range    Glucose (POC) 125 (H) 65 - 100 mg/dL   FERRITIN    Collection Time: 03/27/17 10:25 PM   Result Value Ref Range    Ferritin 464 (H) 8 - 388 NG/ML   TRANSFERRIN SATURATION    Collection Time: 03/27/17 10:25 PM   Result Value Ref Range    Iron 21 (L) 35 - 150 ug/dL    TIBC 166 (L) 250 - 450 ug/dL    Transferrin Saturation 13 (L) >20 %   VITAMIN B12    Collection Time: 03/27/17 10:25 PM   Result Value Ref Range    Vitamin B12 514 193 - 986 pg/mL   FOLATE    Collection Time: 03/27/17 10:25 PM   Result Value Ref Range    Folate 6.9 3.1 - 17.5 ng/mL   PTT    Collection Time: 03/27/17 11:55 PM   Result Value Ref Range    aPTT 89.9 (H) 23.5 - 31.7 SEC   GLUCOSE, POC    Collection Time: 03/28/17  6:42 AM   Result Value Ref Range    Glucose (POC) 116 (H) 65 - 100 mg/dL   PROTHROMBIN TIME + INR    Collection Time: 03/28/17  7:35 AM   Result Value Ref Range    Prothrombin time 21.4 (H) 9.6 - 12.0 sec    INR 2.0 (H) 0.9 - 1.2     PTT    Collection Time: 03/28/17  7:35 AM   Result Value Ref Range    aPTT >110.0 (HH) 23.5 - 31.7 SEC   HEMOGLOBIN    Collection Time: 03/28/17  7:35 AM   Result Value Ref Range    HGB 9.1 (L) 13.6 - 17.2 g/dL   RETICULOCYTE COUNT    Collection Time: 03/28/17  7:35 AM   Result Value Ref Range    Reticulocyte count 6.2 (H) 0.3 - 2.0 %    Absolute Retic Cnt. 0.1884 (H) 0.026 - 0.095 M/ul    Immature Retic Fraction 29.0 (H) 2.3 - 13.4 %    Retic Hgb Conc. 28 (L) 29 - 35 pg   GLUCOSE, POC    Collection Time: 03/28/17 11:41 AM   Result Value Ref Range    Glucose (POC) 112 (H) 65 - 100 mg/dL   GLUCOSE, POC    Collection Time: 03/28/17  4:07 PM   Result Value Ref Range    Glucose (POC) 121 (H) 65 - 100 mg/dL   GLUCOSE, POC    Collection Time: 03/28/17  8:07 PM   Result Value Ref Range    Glucose (POC) 125 (H) 65 - 100 mg/dL   PTT, CRRT PROTOCOL (PTT DRIP)    Collection Time: 03/28/17  9:00 PM   Result Value Ref Range    PTT, CRRT PROTOCOL >110.0 (HH) 23.5 - 31.7 SEC   PROTHROMBIN TIME + INR    Collection Time: 03/29/17  6:08 AM   Result Value Ref Range    Prothrombin time 24.4 (H) 9.6 - 12.0 sec    INR 2.2 (H) 0.9 - 1.2     PTT    Collection Time: 03/29/17  6:08 AM   Result Value Ref Range    aPTT 69.2 (H) 23.5 - 31.7 SEC   CBC WITH AUTOMATED DIFF    Collection Time: 03/29/17  6:08 AM   Result Value Ref Range    WBC 11.8 (H) 4.3 - 11.1 K/uL    RBC 2.94 (L) 4.23 - 5.67 M/uL    HGB 8.8 (L) 13.6 - 17.2 g/dL    HCT 27.9 (L) 41.1 - 50.3 %    MCV 94.9 79.6 - 97.8 FL    MCH 29.9 26.1 - 32.9 PG    MCHC 31.5 31.4 - 35.0 g/dL    RDW 18.0 (H) 11.9 - 14.6 %    PLATELET 024 943 - 646 K/uL    MPV 10.5 (L) 10.8 - 14.1 FL    NEUTROPHILS 69 43 - 78 %    LYMPHOCYTES 18 13 - 44 %    MONOCYTES 10 4.0 - 12.0 %    EOSINOPHILS 2 0.5 - 7.8 %    BASOPHILS 1 0.0 - 2.0 %    ABS. NEUTROPHILS 8.1 1.7 - 8.2 K/UL    ABS. LYMPHOCYTES 2.1 0.5 - 4.6 K/UL    ABS. MONOCYTES 1.2 0.1 - 1.3 K/UL    ABS. EOSINOPHILS 0.2 0.0 - 0.8 K/UL    ABS. BASOPHILS 0.1 0.0 - 0.2 K/UL    ABS. IMM.  GRANS. 0.1 0.0 - 0.5 K/UL    RBC COMMENTS SLIGHT  ANISOCYTOSIS        RBC COMMENTS SLIGHT  POLYCHROMASIA        WBC COMMENTS Result Confirmed By Smear      PLATELET COMMENTS ADEQUATE      DF MANUAL     METABOLIC PANEL, COMPREHENSIVE    Collection Time: 03/29/17  6:08 AM   Result Value Ref Range    Sodium 140 136 - 145 mmol/L    Potassium 3.8 3.5 - 5.1 mmol/L    Chloride 103 98 - 107 mmol/L    CO2 25 21 - 32 mmol/L    Anion gap 12 7 - 16 mmol/L    Glucose 83 65 - 100 mg/dL    BUN 12 8 - 23 MG/DL    Creatinine 0. 95 0.8 - 1.5 MG/DL    GFR est AA >60 >60 ml/min/1.73m2    GFR est non-AA >60 >60 ml/min/1.73m2    Calcium 8.0 (L) 8.3 - 10.4 MG/DL    Bilirubin, total 0.7 0.2 - 1.1 MG/DL    ALT (SGPT) 12 12 - 65 U/L    AST (SGOT) 18 15 - 37 U/L    Alk.  phosphatase 91 50 - 136 U/L    Protein, total 5.8 (L) 6.3 - 8.2 g/dL    Albumin 1.9 (L) 3.2 - 4.6 g/dL    Globulin 3.9 (H) 2.3 - 3.5 g/dL    A-G Ratio 0.5 (L) 1.2 - 3.5     GLUCOSE, POC    Collection Time: 03/29/17  7:57 AM   Result Value Ref Range    Glucose (POC) 106 (H) 65 - 100 mg/dL   GLUCOSE, POC    Collection Time: 03/29/17 11:23 AM   Result Value Ref Range    Glucose (POC) 129 (H) 65 - 100 mg/dL   OCCULT BLOOD, STOOL    Collection Time: 03/29/17  3:00 PM   Result Value Ref Range    Occult blood, stool NEGATIVE  NEG     GLUCOSE, POC    Collection Time: 03/29/17  5:28 PM   Result Value Ref Range    Glucose (POC) 111 (H) 65 - 100 mg/dL   GLUCOSE, POC    Collection Time: 03/29/17  7:06 PM   Result Value Ref Range    Glucose (POC) 117 (H) 65 - 100 mg/dL   CBC W/O DIFF    Collection Time: 03/29/17  7:40 PM   Result Value Ref Range    WBC 12.9 (H) 4.3 - 11.1 K/uL    RBC 3.17 (L) 4.23 - 5.67 M/uL    HGB 9.5 (L) 13.6 - 17.2 g/dL    HCT 30.0 (L) 41.1 - 50.3 %    MCV 94.6 79.6 - 97.8 FL    MCH 30.0 26.1 - 32.9 PG    MCHC 31.7 31.4 - 35.0 g/dL    RDW 17.4 (H) 11.9 - 14.6 %    PLATELET 42 (L) 341 - 450 K/uL    MPV 9.9 (L) 10.8 - 14.1 FL   PROTHROMBIN TIME + INR    Collection Time: 03/30/17  6:02 AM   Result Value Ref Range    Prothrombin time 27.2 (H) 9.6 - 12.0 sec    INR 2.5 (H) 0.9 - 1.2     CBC WITH AUTOMATED DIFF    Collection Time: 03/30/17  6:02 AM   Result Value Ref Range    WBC 9.7 4.3 - 11.1 K/uL    RBC 2.84 (L) 4.23 - 5.67 M/uL    HGB 8.4 (L) 13.6 - 17.2 g/dL    HCT 26.6 (L) 41.1 - 50.3 %    MCV 93.7 79.6 - 97.8 FL    MCH 29.6 26.1 - 32.9 PG    MCHC 31.6 31.4 - 35.0 g/dL    RDW 17.5 (H) 11.9 - 14.6 %    PLATELET 808 506 - 613 K/uL    MPV 9.5 (L) 10.8 - 14.1 FL    DF AUTOMATED NEUTROPHILS 76 43 - 78 %    LYMPHOCYTES 11 (L) 13 - 44 %    MONOCYTES 11 4.0 - 12.0 %    EOSINOPHILS 1 0.5 - 7.8 %    BASOPHILS 0 0.0 - 2.0 %    IMMATURE GRANULOCYTES 0.9 0.0 - 5.0 %    ABS. NEUTROPHILS 7.4 1.7 - 8.2 K/UL    ABS. LYMPHOCYTES 1.1 0.5 - 4.6 K/UL    ABS. MONOCYTES 1.0 0.1 - 1.3 K/UL    ABS. EOSINOPHILS 0.1 0.0 - 0.8 K/UL    ABS. BASOPHILS 0.0 0.0 - 0.2 K/UL    ABS. IMM. GRANS. 0.1 0.0 - 0.5 K/UL   METABOLIC PANEL, COMPREHENSIVE    Collection Time: 03/30/17  6:02 AM   Result Value Ref Range    Sodium 140 136 - 145 mmol/L    Potassium 4.4 3.5 - 5.1 mmol/L    Chloride 105 98 - 107 mmol/L    CO2 28 21 - 32 mmol/L    Anion gap 7 7 - 16 mmol/L    Glucose 103 (H) 65 - 100 mg/dL    BUN 15 8 - 23 MG/DL    Creatinine 0.91 0.8 - 1.5 MG/DL    GFR est AA >60 >60 ml/min/1.73m2    GFR est non-AA >60 >60 ml/min/1.73m2    Calcium 8.4 8.3 - 10.4 MG/DL    Bilirubin, total 0.6 0.2 - 1.1 MG/DL    ALT (SGPT) 13 12 - 65 U/L    AST (SGOT) 20 15 - 37 U/L    Alk.  phosphatase 96 50 - 136 U/L    Protein, total 5.6 (L) 6.3 - 8.2 g/dL    Albumin 1.9 (L) 3.2 - 4.6 g/dL    Globulin 3.7 (H) 2.3 - 3.5 g/dL    A-G Ratio 0.5 (L) 1.2 - 3.5     GLUCOSE, POC    Collection Time: 03/30/17  8:24 AM   Result Value Ref Range    Glucose (POC) 108 (H) 65 - 100 mg/dL        Assessment:     Principal Problem:    DVT, lower extremity, proximal, acute ([facility] (3/21/2017)    Active Problems:    Type 2 diabetes mellitus without complication ([facility] (1/14/0456)      Benign non-nodular prostatic hyperplasia without lower urinary tract symptoms (7/22/2016)      Hypertriglyceridemia (7/22/2016)      Depression (7/22/2016)      Obstructive sleep apnea syndrome (7/22/2016)      Overview: Home CPAP      Morbid obesity due to excess calories ([facility] [address] (7/22/2016)      Primary insomnia (7/22/2016)      Benign essential HTN (9/13/2016)        Plan:     Recommendations: Continue Acute Rehab Program  Coordination of rehab/medical care  Counseling of PM & R care issues management  Monitoring and management of medical conditions per plan of care/orders   -Reiterated fact that pt has no medical reasons to not get OOB and pursue therapies.  I d/w him in the presence of the  therapist. He has not tolerated any OOB activities.   -I have encouraged him and told him that he is too impaired for [facility] to be appropriate at this present time-  -continue pain mgt with OC/luz marina scheduled  Discussion with pt/Staff  Reviewed Therapies/Labs/Medications/Records    Signed By:  Eleonora Fortune MD     March 30, 2017

## 2017-03-30 NOTE — PROGRESS NOTES
Problem: Mobility Impaired (Adult and Pediatric)  Goal: *Acute Goals and Plan of Care (Insert Text)  STG:  (1.)Mr. Reina Castle will move from supine to sit and sit to supine , scoot up and down and roll side to side with MINIMAL ASSIST within 5 day(s). (2.)Mr. Reina Castle will transfer from bed to chair and chair to bed with MODERATE ASSIST using the least restrictive device within 5 day(s). (3.)Mr. Reina Castle will ambulate with MODERATE ASSIST for 5 feet with the least restrictive device within 5 day(s). (4.)Mr. Reina Castle will tolerate 25 minutes of therapeutic activity/exercise within 5 days in order to Improve activity tolerance for mobility. (5.)Mr. Reina Castle will perform LE exercises with 1 to 2 cues for form within 3 days to improve strength for functional transfers and ambulation. LTG:  (1.)Mr. Reina Castle will move from supine to sit and sit to supine , scoot up and down and roll side to side in bed with CONTACT GUARD ASSIST within 10 day(s). (2.)Mr. Reina Castle will transfer from bed to chair and chair to bed with MINIMAL ASSIST using the least restrictive device within 10 day(s). (3.)Mr. Reina Castle will ambulate with MINIMAL ASSIST for 25 feet with the least restrictive device within 10 day(s). Goals to be updated as patient progresses. ________________________________________________________________________________________________      PHYSICAL THERAPY: Daily Note, Treatment Day: 3rd and AM 3/30/2017  INPATIENT: Hospital Day: 10  [place] / Product Type: Medicare /      NAME/AGE/GENDER: Clotilde Cisse is a 66 y.o. male            PRIMARY DIAGNOSIS: DVT, lower extremity, proximal, acute, bilateral (HCC) DVT, lower extremity, proximal, acute ([address] DVT, lower extremity, proximal, acute ([facility] [address]        ICD-10: Treatment Diagnosis:       · Difficulty in walking, Not elsewhere classified (R26.2)   Precaution/Allergies:  Sulfite       ASSESSMENT:      Mr. Reina Castle is a 66 y.o. male admitted with extensive DVTs of the LEs. Pt today has continued complaints of LE pain (L>R) and L UE pain. He is frustrated and upset, states \"I don't know what's going on. Everyone is saying different things. \" Ensured pt that from a therapy standpoint, he is able to do as much activity as he is able to tolerate. Agreeable to PT treatment with some encouragement. Performs rolling with MOD/MAX A. Able to move LLE slowly but independently and RLE with some assistance. Sits to EOB with MAX A x2 and total assist to scoot. Bed mobility limited decreased use of LUE. Increased pain R calf upon sitting due to dependent position. Sat at bedside ~10 min and performed BLE exercises. Some C/O dizziness with RLE activity. Unwilling to attempt standing, stating it hurt too much yesterday. Attempted scooting towards HOB but unable to perform. Returned to supine with MOD/MAX A x2. Positioned comfortably in bed with rehab MD at bedside, confirming activity with PT as tolerated. Some improvement in bed mobility assistance and sitting tolerance today with fewer complaints. Will continue POC. Hopefully after conversation with MD, pt will increase participation and motivation with PT. This section established at most recent assessment   PROBLEM LIST (Impairments causing functional limitations):  1. Decreased Strength  2. Decreased ADL/Functional Activities  3. Decreased Transfer Abilities  4. Decreased Ambulation Ability/Technique  5. Decreased Balance  6. Increased Pain  7. Decreased Knowledge of Precautions  8. Decreased [place] with Home Exercise Program    INTERVENTIONS PLANNED: (Benefits and precautions of physical therapy have been discussed with the patient.)  1. Balance Exercise  2. Bed Mobility  3. Family Education  4. Gait Training  5. Home Exercise Program (HEP)  6. Therapeutic Activites  7. Therapeutic Exercise/Strengthening  8. Transfer Training  9. Patient Education  10.  Group Therapy      TREATMENT PLAN: Frequency/Duration: 3 times a week for duration of hospital stay  Rehabilitation Potential For Stated Goals: Aliza Grady REHABILITATION/EQUIPMENT: (at time of discharge pending progress): Continue Skilled Therapy and Rehab. HISTORY:   History of Present Injury/Illness (Reason for Referral):  Per MD Note: \"Eliud Pitts is a 66 y.o. male that presented to the ED with 2 week history of worsening swelling of the bilateral lower extremities with increasing difficulty with ambulation. He had OP US today showing extensive DVT. He is currently undergoing rehab at [facility] following admission at [facility] for JIM requiring 2 runs of HD. He has history of DVT in 2013 and had IVC filter placed at that time. Patient denies dyspnea or chest pain. The hospitalist have been asked to admit. \"  Past Medical History/Comorbidities:   Mr. Cora Arvizu  has a past medical history of Calculus of kidney; Diabetes ([address] and DVT (deep venous thrombosis) ([address] (2013). Mr. Cora Arvizu  has a past surgical history that includes urological.  Social History/Living Environment:   Home Environment: Skilled nursing facility  One/Two Story Residence: One story  Living Alone: No  Support Systems: Spouse/Significant Other/Partner  Patient Expects to be Discharged to[de-identified] Private residence  Current DME Used/Available at Home: None  Tub or Shower Type: Tub/Shower combination  Prior Level of Function/Work/Activity:  Patient ambulatory prior to initial hospitalization. Unsure of current \"baseline\" at rehab.       Number of Personal Factors/Comorbidities that affect the Plan of Care: 1-2: MODERATE COMPLEXITY   EXAMINATION:   Most Recent Physical Functioning:   Gross Assessment:                  Posture:     Balance:  Sitting: Impaired  Sitting - Static: Good (unsupported)  Sitting - Dynamic: Fair (occasional) Bed Mobility:  Rolling: Maximum assistance  Supine to Sit: Maximum assistance;Assist x2  Sit to Supine: Maximum assistance;Assist x2  Scooting: Total assistance  Level of Assistance: Maximum assistance  Interventions: Safety awareness training; Tactile cues; Verbal cues;Manual cues; Weight shifting training/Pressure relief  Duration: 25 Minutes  Wheelchair Mobility:     Transfers:     Gait:             Body Structures Involved:  1. Heart  2. Lungs  3. Muscles Body Functions Affected:  1. Sensory/Pain  2. Hematological  3. Neuromusculoskeletal  4. Movement Related Activities and Participation Affected:  1. Mobility  2. Self Care  3. Domestic Life  4. Interpersonal Interactions and Relationships  5. Community, Social and [place]   Number of elements that affect the Plan of Care: 4+: HIGH COMPLEXITY   CLINICAL PRESENTATION:   Presentation: Evolving clinical presentation with changing clinical characteristics: MODERATE COMPLEXITY   CLINICAL DECISION MAKIN [facility] Inpatient Short Form  How much difficulty does the patient currently have. .. Unable A Lot A Little None   1. Turning over in bed (including adjusting bedclothes, sheets and blankets)? [ ] 1   [X] 2   [ ] 3   [ ] 4   2. Sitting down on and standing up from a chair with arms ( e.g., wheelchair, bedside commode, etc.)   [X] 1   [ ] 2   [ ] 3   [ ] 4   3. Moving from lying on back to sitting on the side of the bed? [ ] 1   [X] 2   [ ] 3   [ ] 4   How much help from another person does the patient currently need. .. Total A Lot A Little None   4. Moving to and from a bed to a chair (including a wheelchair)? [X] 1   [ ] 2   [ ] 3   [ ] 4   5. Need to walk in hospital room? [X] 1   [ ] 2   [ ] 3   [ ] 4   6. Climbing 3-5 steps with a railing? [X] 1   [ ] 2   [ ] 3   [ ] 4   © , Trustees of [address] [place] [address] [address] [id number], under license to [employer].  All rights reserved    Score:  Initial: 8 Most Recent: X (Date: -- )     Interpretation of Tool:  Represents activities that are increasingly more difficult (i.e. Bed mobility, Transfers, Gait).       Score 24 23 22-20 19-15 14-10 9-7 6       Modifier CH CI CJ CK CL CM CN         · Mobility - Walking and Moving Around:               - CURRENT STATUS:    CM - 80%-99% impaired, limited or restricted               - GOAL STATUS:           CL - 60%-79% impaired, limited or restricted               - D/C STATUS:                       ---------------To be determined---------------  [place] / [place] / Product Type: Medicare /       Medical Necessity:     · Patient demonstrates good rehab potential due to higher previous functional level. Reason for Services/Other Comments:  · Patient continues to require modification of therapeutic interventions to increase complexity of exercises. Use of outcome tool(s) and clinical judgement create a POC that gives a: Questionable prediction of patient's progress: MODERATE COMPLEXITY                 TREATMENT:   (In addition to Assessment/Re-Assessment sessions the following treatments were rendered)   Pre-treatment Symptoms/Complaints:  \"I don't even know what I'm supposed to do with therapy\"  Pain: Initial:   Pain Intensity 1: 8  Pain Location 1: Leg  Pain Orientation 1: Right, Left  Pain Intervention(s) 1: Exercise, Repositioned, Nurse notified  Post Session:  Continued pain B LE pain      Therapeutic Activity: (25 Minutes   ):  Therapeutic activities including Bed mobility (sit to supine), seated balance and positioning/scooting, BLE exercises to improve mobility, strength, balance, coordination and activity tolerance. Required maximal   assistance of 2 and max verbal/manual cues to promote static balance in standing and promote coordination of bilateral, lower extremity(s).       Date:  3/27/17 Date:  3/30/17 Date:     Activity/Exercise Parameters Parameters Parameters   AAROM L heel slides 1x8     AAROM L hip abduction 1x8     Ankle pumps 1x10 1x10    Seated LAQ  1x10    Seated hip flexion  1x10 Braces/Orthotics/Lines/Etc:   · magana catheter  Treatment/Session Assessment:    · Response to Treatment: Increased pain with activity but some improvement in sitting tolerance  · Interdisciplinary Collaboration:  · Physical Therapist, Registered Nurse and Physician  · After treatment position/precautions:  · Supine in bed, Bed/Chair-wheels locked, Bed in low position and Call light within reach  · Compliance with Program/Exercises: Will assess as treatment progresses. · Recommendations/Intent for next treatment session: \"Next visit will focus on advancements to more challenging activities and reduction in assistance provided\".   Total Treatment Duration:  PT Patient Time In/Time Out  Time In: 1030  Time Out: [facility]

## 2017-03-30 NOTE — PROGRESS NOTES
Inpatient Hematology / Oncology Progress Note      Admission Date: 3/21/2017  7:10 PM  Reason for Admission/Hospital Course: DVT, lower extremity, proximal, acute, bilateral (HCC)      24 Hour Events:  Feeling a little better today  Continues with BLE pain, still unable to ambulate  Hematoma and pain to Lt medial FA            ROS:  Constitutional: Positive for fatigue and weakness; negative for fever, chills. CV: Negative for chest pain, palpitations, edema. Respiratory: Negative for dyspnea, cough, wheezing. GI: Negative for nausea, abdominal pain, diarrhea. 10 point review of systems is otherwise negative with the exception of the elements mentioned above in the HPI. Allergies   Allergen Reactions    Sulfite Hives       OBJECTIVE:  Patient Vitals for the past 8 hrs:   BP Temp Pulse Resp SpO2   17 1127 142/68 97.9 °F (36.6 °C) 88 18 97 %   17 0820 129/85 98.6 °F (37 °C) 89 18 92 %     Temp (24hrs), Av.5 °F (36.9 °C), Min:97.8 °F (36.6 °C), Max:99.3 °F (37.4 °C)     0701 -  1900  In: 240 [P.O.:240]  Out: -     Physical Exam:  Constitutional: Well developed, well nourished male in no acute distress, lying comfortably in the hospital bed. HEENT: Normocephalic and atraumatic. Oropharynx is clear, mucous membranes are moist. Sclerae anicteric. Neck supple without JVD. No thyromegaly present. Skin Warm and dry. No rash noted. No erythema. No pallor. Bruising to right arm-interior. Hematoma and pain to Lt medial FA. Respiratory Lungs are clear to auscultation bilaterally without wheezes, rales or rhonchi, normal air exchange without accessory muscle use. CVS Normal rate, regular rhythm and normal S1 and S2. No murmurs, gallops, or rubs. Abdomen Soft, nontender and nondistended, normoactive bowel sounds. No palpable mass. Neuro Grossly nonfocal with no obvious sensory or motor deficits. MSK Normal range of motion in general.  BLE edema with tenderness. Psych Appropriate mood and affect.         Labs:      Recent Labs      03/30/17   0602  03/29/17   1940  03/29/17   0608   WBC  9.7  12.9*  11.8*   RBC  2.84*  3.17*  2.94*   HGB  8.4*  9.5*  8.8*   HCT  26.6*  30.0*  27.9*   MCV  93.7  94.6  94.9   MCH  29.6  30.0  29.9   MCHC  31.6  31.7  31.5   RDW  17.5*  17.4*  18.0*   PLT  310  42*  291   GRANS  76   --   69   LYMPH  11*   --   18   MONOS  11   --   10   EOS  1   --   2   BASOS  0   --   1   IG  0.9   --    --    DF  AUTOMATED   --   MANUAL   ANEU  7.4   --   8.1   ABL  1.1   --   2.1   ABM  1.0   --   1.2   ANNIE  0.1   --   0.2   ABB  0.0   --   0.1   AIG  0.1   --   0.1        Recent Labs      03/30/17   0602  03/29/17   0608   NA  140  140   K  4.4  3.8   CL  105  103   CO2  28  25   AGAP  7  12   GLU  103*  83   BUN  15  12   CREA  0.91  0.95   GFRAA  >60  >60   GFRNA  >60  >60   CA  8.4  8.0*   SGOT  20  18   AP  96  91   TP  5.6*  5.8*   ALB  1.9*  1.9*   GLOB  3.7*  3.9*   AGRAT  0.5*  0.5*         Imaging:      ASSESSMENT:    Problem List  Date Reviewed: 3/21/2017          Codes Class Noted    * (Principal)DVT, lower extremity, proximal, acute ([facility] ICD-10-CM: I82.4Y9  ICD-9-CM: 453.41  3/21/2017        Benign essential HTN ICD-10-CM: I10  ICD-9-CM: 401.1  9/13/2016        Type 2 diabetes mellitus without complication ([facility] LGT-39-FK: E11.9  ICD-9-CM: 250.00  7/22/2016        Benign non-nodular prostatic hyperplasia without lower urinary tract symptoms ICD-10-CM: N40.0  ICD-9-CM: 600.90  7/22/2016        Hypertriglyceridemia ICD-10-CM: E78.1  ICD-9-CM: 272.1  7/22/2016        Corporo-venous occlusive erectile dysfunction ICD-10-CM: N52.02  ICD-9-CM: 607.84  7/22/2016        Depression ICD-10-CM: F32.9  ICD-9-CM: 311  7/22/2016        Obstructive sleep apnea syndrome ICD-10-CM: G47.33  ICD-9-CM: 327.23  7/22/2016    Overview Signed 3/21/2017 10:40 PM by BIJAN Zavala     Home CPAP             Morbid obesity due to excess calories ([address] ICD-10-CM: E66.01  ICD-9-CM: 278.01  7/22/2016        Primary insomnia ICD-10-CM: F51.01  ICD-9-CM: 307.42  7/22/2016                PLAN:    -Acute Anemia  Check Haptoglobin, Iron studies, Vitamin B12, Folate, Retic Count, Guiac-stool  We also ordered a xray of his left FA due to his pain that he states continues since his fall, denies previous imaging to that area. 3/29: will obtain abdominal CT to assess for any bleeding for reason of anemia, B12 and folate are normal, retic count is elevated at 6.2, which we find to be a positive sign of increased production, Haptoglobin is normal at 128, Crawford Colin has yet to be obtained  3/30: No bleeding on CT, pt now has hematoma and persistent pain to Lt medial FA, will obtain MRI to determine if source of bleed, Hgb is stable at 8.4 today, INR 2.5 will restart Coumadin. There has been no change in edema and pain to his LEs, will repeat dopplers.                 Yocasta Mckeon NP   [facility]  [address] [address]  Office : [phone]  Fax : [phone]

## 2017-03-30 NOTE — PROGRESS NOTES
Expresses frustration with not feeing fully aware of his current condition and plan of treatment. Requests MD 'take some time and talk to me'  Will f/u with provider. Time spent listening and refilling ice paks and repositioning.

## 2017-03-30 NOTE — PROGRESS NOTES
Warfarin dosing per pharmacist    Shyla Novak is a 66 y.o. male. Height: 5' 6\" (167.6 cm)    Weight: 126.2 kg (278 lb 4.8 oz)    Indication:  Bilateral LE DVT, thrombosed IVC    Goal INR:  2-3    Home dose:  New start    Risk factors or significant drug interactions: Other anticoagulants:  Heparin bridge - Stopped 3/29/17    Daily Monitoring  Date  INR     Warfarin dose HGB              Notes  3/24  1.8  5 mg  9.4  3/25  1.1  5 mg   ---  3/26  1.2  5 mg  7.1  3/27  1.7  4 mg  ---  3/28  2.0  4 mg  9.1  3/29  2.2  4 mg  ---  3/30  2.5  4 mg  8.4    Pharmacy consulted to dose coumadin on 3/24. Pt s/p EKOS and started on coumadin bridged with heparin. Pharmacy re-consulted on 3/30 after warfarin was briefly stopped as patient worked up for possible bleed. Noted hematoma in left forearm and suspected to be source of dropping Hgb. Heme/Onc plans for MRI of the area to determine if source of bleeding. INR remains therapeutic today at 2.5. Warfarin was stopped, but then resumed today, so we will resume dosing with 4 mg tonight. Will continue to check INR daily moving forward.     Thank you,  Columba Costa, PharmD  Clinical Pharmacist  [phone]

## 2017-03-30 NOTE — PROGRESS NOTES
Spoke with MD Akanksha Figueroa regarding pts and daughters c/o pain from magana site. This pain is described as intermittent and he believes its when he is actually urinating. MD offered to remove magana.  Pt declined magana removal reporting he could not use the urinal.

## 2017-03-30 NOTE — INTERDISCIPLINARY ROUNDS
Interdisciplinary team rounds were held 3/30/2017 with the following team members:Care Management, Nursing, Pastoral Care, Pharmacy and Physician. Plan of care discussed. See clinical pathway and/or care plan for interventions and desired outcomes.

## 2017-03-30 NOTE — PROGRESS NOTES
Mary from MRI called requesting more information regarding MRI order. Reportedly per radiologist? [address] would be the study of choice. Ms Sudhakar Watt was paged via triage nurse who may forward the number to Ms Stephen Lopez to call MRI department or 6th floor. Numbers were provided. [phone]-Miss Sudhakar Watt called back, MRI to be cancelled, Ultrasound to be ordered. Thank you Mary in MRI. Thank you Miss Stephen Lopez.

## 2017-03-30 NOTE — PROGRESS NOTES
Hospitalist Progress Note    Patient: Sharda Evans MRN: [id number]  SSN: [id number]    YOB: 1945  Age: 66 y.o. Sex: male      Admit Date: 3/21/2017    LOS: 9 days     Subjective:     66year old CM with a PMH of HTN, depression, DM2, ELIDIA, and questionable history of DVT/PE(?) since he has an IVC filter was recently at [facility] for compression fracture after a mechanical fall and was sent to rehab. While at rehab the patient's legs started swell so they sent him to see his PCP who did a doppler and discovered bilateral DVTs up to the iliac veins. He was admitted and received direct TPA by IR and has been on a Heparin bridge with Coumadin. His Hgb continued to drop down to 6.3 before getting 1U PRBC and hematology was consulted. 3/28 - This mornign the patient is emotional. He is complaining of left forearm pain where his IV site is and just below it. Denies CP/SOB. Denies F/C/N/V.  3/29 - The patient continues to have LUE swelling and pain despite removing IV and icing. States that his legs and back hurt too much to get out of bed. Sat up to side of bed with PT, but could not stand. 3/30 - The patient complains of bladder spasms and left forearm pain today. He continues to refuse to try to work with PT stating that it hurts too much and his legs are too heavy. I stressed the importance of working with PT as he has no medical reasons not to do this. Review of systems negative except stated above. Objective:     Vitals:    03/30/17 0353 03/30/17 0820 03/30/17 1127 03/30/17 1530   BP: 122/57 129/85 142/68 138/78   Pulse: 97 89 88 94   Resp: 18 18 18 18   Temp: 99.3 °F (37.4 °C) 98.6 °F (37 °C) 97.9 °F (36.6 °C) 98.5 °F (36.9 °C)   SpO2: 92% 92% 97% 93%   Weight:       Height:            Intake and Output: Not Accurate    Physical Exam:   GENERAL: alert, cooperative, no distress, appears stated age  EYE: conjunctivae/corneas clear. PERRL.   THROAT & NECK: normal and no erythema or exudates noted.   LUNG: clear to auscultation bilaterally  HEART: regular rate and rhythm, S1S2, no murmur, no JVD  ABDOMEN: soft, non-tender, non-distended. Bowel sounds normal.   EXTREMITIES:  3+ bilateral LE edema  SKIN: Ecchymoses on RUE  NEUROLOGIC: A&Ox3. Cranial nerves 2-12 grossly intact.     Lab/Data Review:  BMP:   Lab Results   Component Value Date/Time     03/30/2017 06:02 AM    K 4.4 03/30/2017 06:02 AM     03/30/2017 06:02 AM    CO2 28 03/30/2017 06:02 AM    AGAP 7 03/30/2017 06:02 AM     (H) 03/30/2017 06:02 AM    BUN 15 03/30/2017 06:02 AM    CREA 0.91 03/30/2017 06:02 AM    GFRAA >60 03/30/2017 06:02 AM    GFRNA >60 03/30/2017 06:02 AM     CBC:   Lab Results   Component Value Date/Time    WBC 9.7 03/30/2017 06:02 AM    HGB 8.4 (L) 03/30/2017 06:02 AM    HCT 26.6 (L) 03/30/2017 06:02 AM     03/30/2017 06:02 AM     COAGS:   Lab Results   Component Value Date/Time    PTP 27.2 (H) 03/30/2017 06:02 AM    INR 2.5 (H) 03/30/2017 06:02 AM        Assessment:     Principal Problem:    DVT, lower extremity, proximal, acute ([address] (3/21/2017)    Active Problems:    Type 2 diabetes mellitus without complication ([address] (3/31/2145)      Benign non-nodular prostatic hyperplasia without lower urinary tract symptoms (7/22/2016)      Hypertriglyceridemia (7/22/2016)      Depression (7/22/2016)      Obstructive sleep apnea syndrome (7/22/2016)      Overview: Home CPAP      Morbid obesity due to excess calories ([address] (7/22/2016)      Primary insomnia (7/22/2016)      Benign essential HTN (9/13/2016)        Plan:     - Check Hgb daily.  - Stop Heparin gtt today  - Start Ceftriaxone for UTI - check urine culture  - Coumadin stopped by hematology - monitor  - No LUE DVT  - Oxycontin 10mg Q12H scheduled, Roxicodone Q6H  - Will need to work with PT  - Appreciate hematology's input and assitance - will need hypercoagulation w/o as OP?  - Appreciate PM&Rs input and assistance  - SW to assist with discharge planning due to patient not wanting to return to rehab    Signed By: Deepali Escalona DO     March 30, 2017

## 2017-03-31 ENCOUNTER — APPOINTMENT (OUTPATIENT)
Dept: ULTRASOUND IMAGING | Age: 72
DRG: 271 | End: 2017-03-31
Attending: NURSE PRACTITIONER
Payer: MEDICARE

## 2017-03-31 LAB
ALBUMIN SERPL BCP-MCNC: 2 G/DL (ref 3.2–4.6)
ALBUMIN/GLOB SERPL: 0.5 {RATIO} (ref 1.2–3.5)
ALP SERPL-CCNC: 109 U/L (ref 50–136)
ALT SERPL-CCNC: 16 U/L (ref 12–65)
ANION GAP BLD CALC-SCNC: 9 MMOL/L (ref 7–16)
AST SERPL W P-5'-P-CCNC: 26 U/L (ref 15–37)
BASOPHILS # BLD AUTO: 0 K/UL (ref 0–0.2)
BASOPHILS # BLD: 0 % (ref 0–2)
BILIRUB SERPL-MCNC: 0.6 MG/DL (ref 0.2–1.1)
BUN SERPL-MCNC: 17 MG/DL (ref 8–23)
CALCIUM SERPL-MCNC: 8.3 MG/DL (ref 8.3–10.4)
CHLORIDE SERPL-SCNC: 104 MMOL/L (ref 98–107)
CO2 SERPL-SCNC: 26 MMOL/L (ref 21–32)
CREAT SERPL-MCNC: 0.91 MG/DL (ref 0.8–1.5)
DIFFERENTIAL METHOD BLD: ABNORMAL
EOSINOPHIL # BLD: 0.1 K/UL (ref 0–0.8)
EOSINOPHIL NFR BLD: 1 % (ref 0.5–7.8)
ERYTHROCYTE [DISTWIDTH] IN BLOOD BY AUTOMATED COUNT: 17.1 % (ref 11.9–14.6)
GLOBULIN SER CALC-MCNC: 3.7 G/DL (ref 2.3–3.5)
GLUCOSE BLD STRIP.AUTO-MCNC: 114 MG/DL (ref 65–100)
GLUCOSE BLD STRIP.AUTO-MCNC: 124 MG/DL (ref 65–100)
GLUCOSE BLD STRIP.AUTO-MCNC: 127 MG/DL (ref 65–100)
GLUCOSE SERPL-MCNC: 114 MG/DL (ref 65–100)
HCT VFR BLD AUTO: 27.7 % (ref 41.1–50.3)
HEPARIN INDUCED PLT,XHIPA: NEGATIVE
HGB BLD-MCNC: 8.6 G/DL (ref 13.6–17.2)
HIT INTERPRETATION,XINTPR: NEGATIVE
HIT PROFILE,XHITT: NORMAL
IMM GRANULOCYTES # BLD: 0.1 K/UL (ref 0–0.5)
IMM GRANULOCYTES NFR BLD AUTO: 0.9 % (ref 0–5)
INR PPP: 2.7 (ref 0.9–1.2)
LYMPHOCYTES # BLD AUTO: 13 % (ref 13–44)
LYMPHOCYTES # BLD: 1.3 K/UL (ref 0.5–4.6)
MCH RBC QN AUTO: 29.4 PG (ref 26.1–32.9)
MCHC RBC AUTO-ENTMCNC: 31 G/DL (ref 31.4–35)
MCV RBC AUTO: 94.5 FL (ref 79.6–97.8)
MONOCYTES # BLD: 0.8 K/UL (ref 0.1–1.3)
MONOCYTES NFR BLD AUTO: 8 % (ref 4–12)
NEUTS SEG # BLD: 7.6 K/UL (ref 1.7–8.2)
NEUTS SEG NFR BLD AUTO: 77 % (ref 43–78)
OPTICAL DENSITY READ,XHITAO: 0.2 ABS
PLATELET # BLD AUTO: 322 K/UL (ref 150–450)
PMV BLD AUTO: 9.2 FL (ref 10.8–14.1)
POTASSIUM SERPL-SCNC: 3.9 MMOL/L (ref 3.5–5.1)
PROT SERPL-MCNC: 5.7 G/DL (ref 6.3–8.2)
PROTHROMBIN TIME: 29.8 SEC (ref 9.6–12)
RBC # BLD AUTO: 2.93 M/UL (ref 4.23–5.67)
SODIUM SERPL-SCNC: 139 MMOL/L (ref 136–145)
WBC # BLD AUTO: 10 K/UL (ref 4.3–11.1)

## 2017-03-31 PROCEDURE — 74011250637 HC RX REV CODE- 250/637: Performed by: INTERNAL MEDICINE

## 2017-03-31 PROCEDURE — 74011000258 HC RX REV CODE- 258: Performed by: INTERNAL MEDICINE

## 2017-03-31 PROCEDURE — 80053 COMPREHEN METABOLIC PANEL: CPT | Performed by: INTERNAL MEDICINE

## 2017-03-31 PROCEDURE — 93970 EXTREMITY STUDY: CPT

## 2017-03-31 PROCEDURE — 74011250636 HC RX REV CODE- 250/636: Performed by: FAMILY MEDICINE

## 2017-03-31 PROCEDURE — 97530 THERAPEUTIC ACTIVITIES: CPT

## 2017-03-31 PROCEDURE — 74011250637 HC RX REV CODE- 250/637: Performed by: PHYSICAL MEDICINE & REHABILITATION

## 2017-03-31 PROCEDURE — 85610 PROTHROMBIN TIME: CPT | Performed by: INTERNAL MEDICINE

## 2017-03-31 PROCEDURE — 65270000029 HC RM PRIVATE

## 2017-03-31 PROCEDURE — 82962 GLUCOSE BLOOD TEST: CPT

## 2017-03-31 PROCEDURE — 74011250636 HC RX REV CODE- 250/636: Performed by: INTERNAL MEDICINE

## 2017-03-31 PROCEDURE — 99231 SBSQ HOSP IP/OBS SF/LOW 25: CPT | Performed by: PHYSICAL MEDICINE & REHABILITATION

## 2017-03-31 PROCEDURE — 74011250637 HC RX REV CODE- 250/637: Performed by: NURSE PRACTITIONER

## 2017-03-31 PROCEDURE — 76881 US COMPL JOINT R-T W/IMG: CPT

## 2017-03-31 PROCEDURE — 85025 COMPLETE CBC W/AUTO DIFF WBC: CPT | Performed by: NURSE PRACTITIONER

## 2017-03-31 PROCEDURE — 36415 COLL VENOUS BLD VENIPUNCTURE: CPT | Performed by: INTERNAL MEDICINE

## 2017-03-31 RX ORDER — PHENAZOPYRIDINE HYDROCHLORIDE 95 MG/1
190 TABLET ORAL
Status: DISCONTINUED | OUTPATIENT
Start: 2017-03-31 | End: 2017-04-04 | Stop reason: HOSPADM

## 2017-03-31 RX ADMIN — WARFARIN SODIUM 4 MG: 2 TABLET ORAL at 21:59

## 2017-03-31 RX ADMIN — OXYCODONE HYDROCHLORIDE 10 MG: 5 TABLET ORAL at 19:53

## 2017-03-31 RX ADMIN — RDII 250 MG CAPSULE 250 MG: at 09:17

## 2017-03-31 RX ADMIN — RDII 250 MG CAPSULE 250 MG: at 17:26

## 2017-03-31 RX ADMIN — URINARY PAIN RELIEF 190 MG: 95 TABLET ORAL at 11:05

## 2017-03-31 RX ADMIN — FAMOTIDINE 20 MG: 20 TABLET, FILM COATED ORAL at 09:17

## 2017-03-31 RX ADMIN — OXYCODONE HYDROCHLORIDE 10 MG: 5 TABLET ORAL at 02:41

## 2017-03-31 RX ADMIN — OXYCODONE HYDROCHLORIDE 10 MG: 10 TABLET, FILM COATED, EXTENDED RELEASE ORAL at 09:17

## 2017-03-31 RX ADMIN — TAMSULOSIN HYDROCHLORIDE 0.4 MG: 0.4 CAPSULE ORAL at 09:17

## 2017-03-31 RX ADMIN — CITALOPRAM HYDROBROMIDE 20 MG: 20 TABLET ORAL at 09:17

## 2017-03-31 RX ADMIN — Medication 4 MG: at 09:23

## 2017-03-31 RX ADMIN — OXYCODONE HYDROCHLORIDE 10 MG: 10 TABLET, FILM COATED, EXTENDED RELEASE ORAL at 22:08

## 2017-03-31 RX ADMIN — Medication 10 ML: at 22:00

## 2017-03-31 RX ADMIN — OLMESARTAN MEDOXOMIL 40 MG: 40 TABLET, FILM COATED ORAL at 09:17

## 2017-03-31 RX ADMIN — GABAPENTIN 100 MG: 100 CAPSULE ORAL at 21:59

## 2017-03-31 RX ADMIN — FAMOTIDINE 20 MG: 20 TABLET, FILM COATED ORAL at 17:26

## 2017-03-31 RX ADMIN — OXYCODONE HYDROCHLORIDE 10 MG: 5 TABLET ORAL at 14:39

## 2017-03-31 RX ADMIN — Medication 10 ML: at 09:29

## 2017-03-31 NOTE — PROGRESS NOTES
Warfarin dosing per pharmacist    Shon Avila is a 66 y.o. male. Height: 5' 6\" (167.6 cm)    Weight: 126.2 kg (278 lb 4.8 oz)    Indication:  Bilateral LE DVT, thrombosed IVC    Goal INR:  2-3    Home dose:  New start    Risk factors or significant drug interactions: Other anticoagulants:  Heparin bridge - Stopped 3/29/17    Daily Monitoring  Date  INR     Warfarin dose HGB              Notes  3/24  1.8  5 mg  9.4  3/25  1.1  5 mg   ---  3/26  1.2  5 mg  7.1  3/27  1.7  4 mg  ---  3/28  2.0  4 mg  9.1  3/29  2.2  4 mg  ---  3/30  2.5  4 mg  8.4  3/31  2.7  4 mg  8.6    Pharmacy consulted to dose coumadin on 3/24. Pt s/p EKOS and started on coumadin bridged with heparin. Pharmacy re-consulted on 3/30 after warfarin was briefly stopped as patient worked up for possible bleed. INR remains therapeutic at 2.7. Will continue dosing with warfarin 4 mg tonight. Will continue to check INR daily moving forward.     Thank you,  Andrey Torres, PharmD  Clinical Pharmacist  [phone]

## 2017-03-31 NOTE — PROGRESS NOTES
Hospitalist Progress Note    Patient: Amandeep Up MRN: [id number]  SSN: [id number]    YOB: 1945  Age: 66 y.o. Sex: male      Admit Date: 3/21/2017    LOS: 10 days     Subjective:     66year old CM with a PMH of HTN, depression, DM2, ELIDIA, and questionable history of DVT/PE(?) since he has an IVC filter was recently at [facility] for compression fracture after a mechanical fall and was sent to rehab. While at rehab the patient's legs started swell so they sent him to see his PCP who did a doppler and discovered bilateral DVTs up to the iliac veins. He was admitted and received direct TPA by IR and has been on a Heparin bridge with Coumadin. His Hgb continued to drop down to 6.3 before getting 1U PRBC and hematology was consulted. 3/29 - The patient continues to have LUE swelling and pain despite removing IV and icing. States that his legs and back hurt too much to get out of bed. Sat up to side of bed with PT, but could not stand. 3/30 - The patient complains of bladder spasms and left forearm pain today. He continues to refuse to try to work with PT stating that it hurts too much and his legs are too heavy. I stressed the importance of working with PT as he has no medical reasons not to do this. 3/31 - The patient states taht his bladder/urethral spasms are worse today. Left forearm more tender. He states that he wants to get to the chair today. No F/C/N/V. No CP/SOB. Review of systems negative except stated above. Objective:     Vitals:    03/30/17 1909 03/30/17 2343 03/31/17 0344 03/31/17 0758   BP: 132/54 119/57 128/52 130/58   Pulse: 89 94 87 89   Resp: 18 18 16 20   Temp: 98.5 °F (36.9 °C) 98.4 °F (36.9 °C) 98.6 °F (37 °C) 98.6 °F (37 °C)   SpO2: 93% 94% 91% 94%   Weight:       Height:            Intake and Output: Not Accurate    Physical Exam:   GENERAL: alert, cooperative, no distress, appears stated age  EYE: conjunctivae/corneas clear. PERRL.   THROAT & NECK: normal and no erythema or exudates noted. LUNG: clear to auscultation bilaterally  HEART: regular rate and rhythm, S1S2, no murmur, no JVD  ABDOMEN: soft, non-tender, non-distended. Bowel sounds normal.   EXTREMITIES:  3+ bilateral LE edema, LUE swollen and tender  SKIN: Ecchymoses on RUE  NEUROLOGIC: A&Ox3. Cranial nerves 2-12 grossly intact. Lab/Data Review:  BMP:   Lab Results   Component Value Date/Time     03/31/2017 07:45 AM    K 3.9 03/31/2017 07:45 AM     03/31/2017 07:45 AM    CO2 26 03/31/2017 07:45 AM    AGAP 9 03/31/2017 07:45 AM     (H) 03/31/2017 07:45 AM    BUN 17 03/31/2017 07:45 AM    CREA 0.91 03/31/2017 07:45 AM    GFRAA >60 03/31/2017 07:45 AM    GFRNA >60 03/31/2017 07:45 AM     CBC:   Lab Results   Component Value Date/Time    WBC 10.0 03/31/2017 07:45 AM    HGB 8.6 (L) 03/31/2017 07:45 AM    HCT 27.7 (L) 03/31/2017 07:45 AM     03/31/2017 07:45 AM     COAGS:   Lab Results   Component Value Date/Time    PTP 29.8 (H) 03/31/2017 07:45 AM    INR 2.7 (H) 03/31/2017 07:45 AM        Assessment:     Principal Problem:    DVT, lower extremity, proximal, acute ([address] (3/21/2017)    Active Problems:    Type 2 diabetes mellitus without complication ([address] (7/68/4922)      Benign non-nodular prostatic hyperplasia without lower urinary tract symptoms (7/22/2016)      Hypertriglyceridemia (7/22/2016)      Depression (7/22/2016)      Obstructive sleep apnea syndrome (7/22/2016)      Overview: Home CPAP      Morbid obesity due to excess calories ([address] (7/22/2016)      Primary insomnia (7/22/2016)      Benign essential HTN (9/13/2016)        Plan:     - Check Hgb daily.  Currently stable  - Ceftriaxone for UTI - urine culture showing GNR  - Remove [place]  - Coumadin restarted - per pharmacy  - No LUE DVT - getting LUE U/S to look for source of bleeding into arm  - Oxycontin 10mg Q12H scheduled, Roxicodone Q6H  - Will need to work with PT  - Appreciate hematology's input and assitance - will need hypercoagulation w/o as OP?  - Appreciate PM&Rs input and assistance  - SW to assist with discharge planning    Signed By: Ju Lizama DO     March 31, 2017

## 2017-03-31 NOTE — PROGRESS NOTES
PM&R Consult Progress Note      Patient: Sha Henriquez  Admit Date: 3/21/2017  Admit Diagnosis: DVT, lower extremity, proximal, acute, bilateral ([address]  Recommendations: Continue Acute Rehab Program, Coordination of rehab/medical care, Counseling of PM & R care issues management, Subacute Rehab  MUST BE ABLE TO TOLERATE OOB ACTIVITIES; HE HAS YET TO ATTEMPT TRANSFERS OR SITTING UP IN CHAIR. History/Subjective/Complaint:     Records reviewed. Spoke with wife in length re; IRC vs SNF criteria and expectations. She had not been here all week and was unaware that pt has been given all medical clearance to participate in therapies. He, however, has not tolerated anything other than EOB activities. She was wary of him returning to a SNF due to previous experience    Pain 1  Pain Scale 1: Visual (03/31/17 1026)  Pain Intensity 1: 0 (03/31/17 1026)  Patient Stated Pain Goal: 0 (03/31/17 0934)  Pain Reassessment 1: Patient sleeping (03/31/17 1026)  Pain Onset 1:  (pta) (03/30/17 1743)  Pain Location 1: Groin (03/31/17 0934)  Pain Orientation 1: Other (comment) (when \"voiding\") (03/31/17 0727)  Pain Description 1: Burning; Sharp (03/31/17 0934)  Pain Intervention(s) 1: Medication (see MAR) (03/31/17 0934)     Objective:     Vitals:  Patient Vitals for the past 8 hrs:   BP Temp Pulse Resp SpO2   03/31/17 0758 130/58 98.6 °F (37 °C) 89 20 94 %      Intake and Output:  03/29 1901 - 03/31 0700  In: 240 [P.O.:240]  Out: 675 [Urine:675]    Allergies   Allergen Reactions    Sulfite Hives     Current Facility-Administered Medications   Medication Dose Route Frequency    phenazopyridine (PYRIDIUM) tab 190 mg  190 mg Oral TID PRN    warfarin (COUMADIN) tablet 4 mg  4 mg Oral QHS    cefTRIAXone (ROCEPHIN) 1 g in 0.9% sodium chloride (MBP/ADV) 50 mL  1 g IntraVENous Q24H    oxyCODONE ER (OxyCONTIN) tablet 10 mg  10 mg Oral Q12H    oxyCODONE IR (ROXICODONE) tablet 10 mg  10 mg Oral Q6H    gabapentin (NEURONTIN) capsule 100 mg 100 mg Oral QHS    citalopram (CELEXA) tablet 20 mg  20 mg Oral DAILY    acetaminophen (TYLENOL) tablet 650 mg  650 mg Oral Q6H PRN    famotidine (PEPCID) tablet 20 mg  20 mg Oral BID    Saccharomyces boulardii (FLORASTOR) capsule 250 mg  250 mg Oral BID    tamsulosin (FLOMAX) capsule 0.4 mg  0.4 mg Oral DAILY    olmesartan (BENICAR) tablet 40 mg  40 mg Oral DAILY    bisacodyl (DULCOLAX) suppository 10 mg  10 mg Rectal DAILY PRN    LORazepam (ATIVAN) tablet 1 mg  1 mg Oral BID PRN    zolpidem (AMBIEN) tablet 5 mg  5 mg Oral QHS PRN    ondansetron (ZOFRAN) injection 4 mg  4 mg IntraVENous Q6H PRN    morphine injection 4 mg  4 mg IntraVENous Q4H PRN    hydrALAZINE (APRESOLINE) 20 mg/mL injection 20 mg  20 mg IntraVENous Q6H PRN    sodium chloride (NS) flush 5-10 mL  5-10 mL IntraVENous Q8H    sodium chloride (NS) flush 5-10 mL  5-10 mL IntraVENous PRN    insulin lispro (HUMALOG) injection   SubCUTAneous AC&HS       Physical Exam:  Pt off floor, not examined        Functional Assessment:  Gross Assessment  AROM:  (LUE limited, some function, edema: RUE decreased, functional) (03/29/17 0900)  Strength:  (LUE limited, some function, edema: RUE decreased, functional) (03/29/17 0900)  Coordination: Generally decreased, functional (03/29/17 0900)           Bed Mobility  Rolling: Maximum assistance (03/30/17 1100)  Supine to Sit: Maximum assistance;Assist x2 (03/30/17 1100)  Sit to Supine: Maximum assistance;Assist x2 (03/30/17 1100)  Scooting:  Total assistance (03/30/17 1100)     Balance  Sitting: Impaired (03/30/17 1100)  Sitting - Static: Good (unsupported) (03/30/17 1100)  Sitting - Dynamic: Fair (occasional) (03/30/17 1100)  Standing: Impaired (03/29/17 1000)  Standing - Static: Poor (03/29/17 1000)  Standing - Dynamic : Poor (03/29/17 1000)                       Bed/Mat Mobility  Rolling: Maximum assistance (03/30/17 1100)  Supine to Sit: Maximum assistance;Assist x2 (03/30/17 1100)  Sit to Supine: Maximum assistance;Assist x2 (03/30/17 1100)  Sit to Stand: Maximum assistance;Assist x2 (03/29/17 1000)  Bed to Chair:  (unable at this time) (03/29/17 1001)  Scooting: Total assistance (03/30/17 1100)     Labs/Studies:  Recent Results (from the past 72 hour(s))   GLUCOSE, POC    Collection Time: 03/28/17  4:07 PM   Result Value Ref Range    Glucose (POC) 121 (H) 65 - 100 mg/dL   GLUCOSE, POC    Collection Time: 03/28/17  8:07 PM   Result Value Ref Range    Glucose (POC) 125 (H) 65 - 100 mg/dL   PTT, CRRT PROTOCOL (PTT DRIP)    Collection Time: 03/28/17  9:00 PM   Result Value Ref Range    PTT, CRRT PROTOCOL >110.0 (HH) 23.5 - 31.7 SEC   PROTHROMBIN TIME + INR    Collection Time: 03/29/17  6:08 AM   Result Value Ref Range    Prothrombin time 24.4 (H) 9.6 - 12.0 sec    INR 2.2 (H) 0.9 - 1.2     PTT    Collection Time: 03/29/17  6:08 AM   Result Value Ref Range    aPTT 69.2 (H) 23.5 - 31.7 SEC   CBC WITH AUTOMATED DIFF    Collection Time: 03/29/17  6:08 AM   Result Value Ref Range    WBC 11.8 (H) 4.3 - 11.1 K/uL    RBC 2.94 (L) 4.23 - 5.67 M/uL    HGB 8.8 (L) 13.6 - 17.2 g/dL    HCT 27.9 (L) 41.1 - 50.3 %    MCV 94.9 79.6 - 97.8 FL    MCH 29.9 26.1 - 32.9 PG    MCHC 31.5 31.4 - 35.0 g/dL    RDW 18.0 (H) 11.9 - 14.6 %    PLATELET 741 732 - 998 K/uL    MPV 10.5 (L) 10.8 - 14.1 FL    NEUTROPHILS 69 43 - 78 %    LYMPHOCYTES 18 13 - 44 %    MONOCYTES 10 4.0 - 12.0 %    EOSINOPHILS 2 0.5 - 7.8 %    BASOPHILS 1 0.0 - 2.0 %    ABS. NEUTROPHILS 8.1 1.7 - 8.2 K/UL    ABS. LYMPHOCYTES 2.1 0.5 - 4.6 K/UL    ABS. MONOCYTES 1.2 0.1 - 1.3 K/UL    ABS. EOSINOPHILS 0.2 0.0 - 0.8 K/UL    ABS. BASOPHILS 0.1 0.0 - 0.2 K/UL    ABS. IMM.  GRANS. 0.1 0.0 - 0.5 K/UL    RBC COMMENTS SLIGHT  ANISOCYTOSIS        RBC COMMENTS SLIGHT  POLYCHROMASIA        WBC COMMENTS Result Confirmed By Smear      PLATELET COMMENTS ADEQUATE      DF MANUAL     METABOLIC PANEL, COMPREHENSIVE    Collection Time: 03/29/17  6:08 AM   Result Value Ref Range    Sodium 140 136 - 145 mmol/L    Potassium 3.8 3.5 - 5.1 mmol/L    Chloride 103 98 - 107 mmol/L    CO2 25 21 - 32 mmol/L    Anion gap 12 7 - 16 mmol/L    Glucose 83 65 - 100 mg/dL    BUN 12 8 - 23 MG/DL    Creatinine 0.95 0.8 - 1.5 MG/DL    GFR est AA >60 >60 ml/min/1.73m2    GFR est non-AA >60 >60 ml/min/1.73m2    Calcium 8.0 (L) 8.3 - 10.4 MG/DL    Bilirubin, total 0.7 0.2 - 1.1 MG/DL    ALT (SGPT) 12 12 - 65 U/L    AST (SGOT) 18 15 - 37 U/L    Alk.  phosphatase 91 50 - 136 U/L    Protein, total 5.8 (L) 6.3 - 8.2 g/dL    Albumin 1.9 (L) 3.2 - 4.6 g/dL    Globulin 3.9 (H) 2.3 - 3.5 g/dL    A-G Ratio 0.5 (L) 1.2 - 3.5     GLUCOSE, POC    Collection Time: 03/29/17  7:57 AM   Result Value Ref Range    Glucose (POC) 106 (H) 65 - 100 mg/dL   GLUCOSE, POC    Collection Time: 03/29/17 11:23 AM   Result Value Ref Range    Glucose (POC) 129 (H) 65 - 100 mg/dL   OCCULT BLOOD, STOOL    Collection Time: 03/29/17  3:00 PM   Result Value Ref Range    Occult blood, stool NEGATIVE  NEG     URINALYSIS W/ RFLX MICROSCOPIC    Collection Time: 03/29/17  3:15 PM   Result Value Ref Range    Color SIERRA      Appearance TURBID      Specific gravity 1.018 1.001 - 1.023      pH (UA) 6.0 5.0 - 9.0      Protein 30 (A) NEG mg/dL    Glucose NEGATIVE  mg/dL    Ketone NEGATIVE  NEG mg/dL    Bilirubin NEGATIVE  NEG      Blood MODERATE (A) NEG      Urobilinogen 1.0 0.2 - 1.0 EU/dL    Nitrites POSITIVE (A) NEG      Leukocyte Esterase MODERATE (A) NEG      WBC >100 0 /hpf    Bacteria 4+ (H) 0 /hpf    Casts GRANULAR 0 /lpf    Amorphous Crystals 3+ (H) 0   GLUCOSE, POC    Collection Time: 03/29/17  5:28 PM   Result Value Ref Range    Glucose (POC) 111 (H) 65 - 100 mg/dL   GLUCOSE, POC    Collection Time: 03/29/17  7:06 PM   Result Value Ref Range    Glucose (POC) 117 (H) 65 - 100 mg/dL   CBC W/O DIFF    Collection Time: 03/29/17  7:40 PM   Result Value Ref Range    WBC 12.9 (H) 4.3 - 11.1 K/uL    RBC 3.17 (L) 4.23 - 5.67 M/uL    HGB 9.5 (L) 13.6 - 17.2 g/dL HCT 30.0 (L) 41.1 - 50.3 %    MCV 94.6 79.6 - 97.8 FL    MCH 30.0 26.1 - 32.9 PG    MCHC 31.7 31.4 - 35.0 g/dL    RDW 17.4 (H) 11.9 - 14.6 %    PLATELET 42 (L) 697 - 450 K/uL    MPV 9.9 (L) 10.8 - 14.1 FL   HIT PROFILE    Collection Time: 03/30/17  6:02 AM   Result Value Ref Range    HIT PROFILE PERFORMED BY [facility]      HEPARIN INDUCED PLT AB. NEGATIVE       OPTICAL DENSITY READ 0.198 <0.400 ABS    HIT INTERPRETATION NEGATIVE      PROTHROMBIN TIME + INR    Collection Time: 03/30/17  6:02 AM   Result Value Ref Range    Prothrombin time 27.2 (H) 9.6 - 12.0 sec    INR 2.5 (H) 0.9 - 1.2     CBC WITH AUTOMATED DIFF    Collection Time: 03/30/17  6:02 AM   Result Value Ref Range    WBC 9.7 4.3 - 11.1 K/uL    RBC 2.84 (L) 4.23 - 5.67 M/uL    HGB 8.4 (L) 13.6 - 17.2 g/dL    HCT 26.6 (L) 41.1 - 50.3 %    MCV 93.7 79.6 - 97.8 FL    MCH 29.6 26.1 - 32.9 PG    MCHC 31.6 31.4 - 35.0 g/dL    RDW 17.5 (H) 11.9 - 14.6 %    PLATELET 831 241 - 242 K/uL    MPV 9.5 (L) 10.8 - 14.1 FL    DF AUTOMATED      NEUTROPHILS 76 43 - 78 %    LYMPHOCYTES 11 (L) 13 - 44 %    MONOCYTES 11 4.0 - 12.0 %    EOSINOPHILS 1 0.5 - 7.8 %    BASOPHILS 0 0.0 - 2.0 %    IMMATURE GRANULOCYTES 0.9 0.0 - 5.0 %    ABS. NEUTROPHILS 7.4 1.7 - 8.2 K/UL    ABS. LYMPHOCYTES 1.1 0.5 - 4.6 K/UL    ABS. MONOCYTES 1.0 0.1 - 1.3 K/UL    ABS. EOSINOPHILS 0.1 0.0 - 0.8 K/UL    ABS. BASOPHILS 0.0 0.0 - 0.2 K/UL    ABS. IMM.  GRANS. 0.1 0.0 - 0.5 K/UL   METABOLIC PANEL, COMPREHENSIVE    Collection Time: 03/30/17  6:02 AM   Result Value Ref Range    Sodium 140 136 - 145 mmol/L    Potassium 4.4 3.5 - 5.1 mmol/L    Chloride 105 98 - 107 mmol/L    CO2 28 21 - 32 mmol/L    Anion gap 7 7 - 16 mmol/L    Glucose 103 (H) 65 - 100 mg/dL    BUN 15 8 - 23 MG/DL    Creatinine 0.91 0.8 - 1.5 MG/DL    GFR est AA >60 >60 ml/min/1.73m2    GFR est non-AA >60 >60 ml/min/1.73m2    Calcium 8.4 8.3 - 10.4 MG/DL    Bilirubin, total 0.6 0.2 - 1.1 MG/DL    ALT (SGPT) 13 12 - 65 U/L    AST (SGOT) 20 15 - 37 U/L    Alk. phosphatase 96 50 - 136 U/L    Protein, total 5.6 (L) 6.3 - 8.2 g/dL    Albumin 1.9 (L) 3.2 - 4.6 g/dL    Globulin 3.7 (H) 2.3 - 3.5 g/dL    A-G Ratio 0.5 (L) 1.2 - 3.5     GLUCOSE, POC    Collection Time: 03/30/17  8:24 AM   Result Value Ref Range    Glucose (POC) 108 (H) 65 - 100 mg/dL   GLUCOSE, POC    Collection Time: 03/30/17 11:32 AM   Result Value Ref Range    Glucose (POC) 109 (H) 65 - 100 mg/dL   CULTURE, URINE    Collection Time: 03/30/17  3:15 PM   Result Value Ref Range    Special Requests: NO SPECIAL REQUESTS      Culture result: >100,000  COLONIES/mL  GRAM NEGATIVE RODS   (A)      Culture result: SUBCULTURE IN PROGRESS     GLUCOSE, POC    Collection Time: 03/30/17  4:18 PM   Result Value Ref Range    Glucose (POC) 119 (H) 65 - 100 mg/dL   GLUCOSE, POC    Collection Time: 03/30/17  7:08 PM   Result Value Ref Range    Glucose (POC) 136 (H) 65 - 100 mg/dL   PROTHROMBIN TIME + INR    Collection Time: 03/31/17  7:45 AM   Result Value Ref Range    Prothrombin time 29.8 (H) 9.6 - 12.0 sec    INR 2.7 (H) 0.9 - 1.2     METABOLIC PANEL, COMPREHENSIVE    Collection Time: 03/31/17  7:45 AM   Result Value Ref Range    Sodium 139 136 - 145 mmol/L    Potassium 3.9 3.5 - 5.1 mmol/L    Chloride 104 98 - 107 mmol/L    CO2 26 21 - 32 mmol/L    Anion gap 9 7 - 16 mmol/L    Glucose 114 (H) 65 - 100 mg/dL    BUN 17 8 - 23 MG/DL    Creatinine 0.91 0.8 - 1.5 MG/DL    GFR est AA >60 >60 ml/min/1.73m2    GFR est non-AA >60 >60 ml/min/1.73m2    Calcium 8.3 8.3 - 10.4 MG/DL    Bilirubin, total 0.6 0.2 - 1.1 MG/DL    ALT (SGPT) 16 12 - 65 U/L    AST (SGOT) 26 15 - 37 U/L    Alk.  phosphatase 109 50 - 136 U/L    Protein, total 5.7 (L) 6.3 - 8.2 g/dL    Albumin 2.0 (L) 3.2 - 4.6 g/dL    Globulin 3.7 (H) 2.3 - 3.5 g/dL    A-G Ratio 0.5 (L) 1.2 - 3.5     CBC WITH AUTOMATED DIFF    Collection Time: 03/31/17  7:45 AM   Result Value Ref Range    WBC 10.0 4.3 - 11.1 K/uL    RBC 2.93 (L) 4.23 - 5.67 M/uL    HGB 8.6 (L) 13.6 - 17.2 g/dL    HCT 27.7 (L) 41.1 - 50.3 %    MCV 94.5 79.6 - 97.8 FL    MCH 29.4 26.1 - 32.9 PG    MCHC 31.0 (L) 31.4 - 35.0 g/dL    RDW 17.1 (H) 11.9 - 14.6 %    PLATELET 987 595 - 797 K/uL    MPV 9.2 (L) 10.8 - 14.1 FL    DF AUTOMATED      NEUTROPHILS 77 43 - 78 %    LYMPHOCYTES 13 13 - 44 %    MONOCYTES 8 4.0 - 12.0 %    EOSINOPHILS 1 0.5 - 7.8 %    BASOPHILS 0 0.0 - 2.0 %    IMMATURE GRANULOCYTES 0.9 0.0 - 5.0 %    ABS. NEUTROPHILS 7.6 1.7 - 8.2 K/UL    ABS. LYMPHOCYTES 1.3 0.5 - 4.6 K/UL    ABS. MONOCYTES 0.8 0.1 - 1.3 K/UL    ABS. EOSINOPHILS 0.1 0.0 - 0.8 K/UL    ABS. BASOPHILS 0.0 0.0 - 0.2 K/UL    ABS. IMM.  GRANS. 0.1 0.0 - 0.5 K/UL   GLUCOSE, POC    Collection Time: 03/31/17 11:20 AM   Result Value Ref Range    Glucose (POC) 127 (H) 65 - 100 mg/dL        Assessment:     Principal Problem:    DVT, lower extremity, proximal, acute ([facility] [address] (3/21/2017)    Active Problems:    Type 2 diabetes mellitus without complication ([address] (1/53/9129)      Benign non-nodular prostatic hyperplasia without lower urinary tract symptoms (7/22/2016)      Hypertriglyceridemia (7/22/2016)      Depression (7/22/2016)      Obstructive sleep apnea syndrome (7/22/2016)      Overview: Home CPAP      Morbid obesity due to excess calories ([address] (7/22/2016)      Primary insomnia (7/22/2016)      Benign essential HTN (9/13/2016)        Plan:     Recommendations: Continue Acute Rehab Program  Coordination of rehab/medical care  Counseling of PM & R care issues management  Monitoring and management of medical conditions per plan of care/orders   -cont to encourage therapy participation  -consider ditropan for bladder spasms due to UTI/ on pyridium for dysuria; on Rocephin; Ident/sens pending  -pain mgt; OC/Kathy scheduled; will increase Neurontin to 200 bid; will gauge pain mgt with activity level  -depression; cont Celexa; pt thinks its helped some  -will f/u Monday; depending on participation and progress over weekend; will decide then if [facility] is appropriate; discussed with wife and she is now aware that the pt needs to take responsibility regarding rehab participation, or lack of.  She will encourage him to actively engage with PT/OT and work thru his pain    Reviewed Therapies/Labs/Medications/Records    Signed By:  Kun Hayes MD     March 31, 2017

## 2017-03-31 NOTE — PROGRESS NOTES
Problem: Self Care Deficits Care Plan (Adult)  Goal: *Acute Goals and Plan of Care (Insert Text)  1. Patient will complete lower body bathing and dressing with moderate assistance and adaptive equipment as needed. 2. Patient will complete toileting with moderate assistance. 3. Patient will tolerate 20 minutes of OT treatment with 2 rest breaks to increase activity tolerance for ADLs. 4. Patient will complete functional transfers with moderate assistance and adaptive equipment as needed. 5. Patient will tolerate sitting edge of bed for 5 minutes with modified independence for ADLs. 6. Patient will complete grooming with setup assistance and adaptive equipment as needed. Timeframe: 7 visits       OCCUPATIONAL THERAPY: Daily Note and Treatment Day: 1st 3/31/2017  INPATIENT: Hospital Day: 11  [place] / Product Type: Medicare /      NAME/AGE/GENDER: Raoul Lee is a 66 y.o. male            PRIMARY DIAGNOSIS:  DVT, lower extremity, proximal, acute, bilateral (HCC) DVT, lower extremity, proximal, acute ([address] DVT, lower extremity, proximal, acute ([facility] [address]        ICD-10: Treatment Diagnosis:        · Generalized Muscle Weakness (M62.81)  · Other lack of cordination (R27.8)   Precautions/Allergies:         Sulfite       ASSESSMENT:      Mr. Belem Arriaga presents supine in bed, agreeable to OT treatment session. He reports 6/10 pain in BLE and LUE. RN was made aware. He participated in bed mobility today and required max A x2 for rolling, supine to sit, and sit to supine. He required total A x2 for scooting. He tolerated EOB sitting for 6 minutes until he fatigued. He relied on constant prop sitting to maintain static sitting balance. He was left in supine with all needs met. Mr. Belem Arriaga was much more motivated to participate in therapy today, as opposed to evaluation note, and his wife is very supportive of his rehab needs.  He will continue to benefit from therapy in order to improve functional deficits and increase independence with ADLs and functional mobility. Will continue therapeutic efforts. This section established at most recent assessment   PROBLEM LIST (Impairments causing functional limitations):  1. Decreased Strength  2. Decreased ADL/Functional Activities  3. Decreased Transfer Abilities  4. Decreased Ambulation Ability/Technique  5. Decreased Balance  6. Increased Pain  7. Decreased Activity Tolerance  8. Decreased Work Simplification/Energy Conservation Techniques  9. Increased Fatigue  10. Decreased Flexibility/Joint Mobility  11. Decreased Skin Integrity/Hygeine  12. Decreased [place] with Home Exercise Program    INTERVENTIONS PLANNED: (Benefits and precautions of occupational therapy have been discussed with the patient.)  1. Activities of daily living training  2. Adaptive equipment training  3. Balance training  4. Clothing management  5. Donning&doffing training  6. Group therapy  7. Hygiene training  8. Theraputic activity  9. Theraputic exercise      TREATMENT PLAN: Frequency/Duration: Follow patient 3x/week to address above goals. Rehabilitation Potential For Stated Goals: GOOD      RECOMMENDED REHABILITATION/EQUIPMENT: (at time of discharge pending progress): Continue Skilled Therapy. OCCUPATIONAL PROFILE AND HISTORY:   History of Present Injury/Illness (Reason for Referral):  Amandeep Up is a 66 y.o. male that presented to the ED with 2 week history of worsening swelling of the bilateral lower extremities with increasing difficulty with ambulation. He had OP US today showing extensive DVT. He is currently undergoing rehab at [facility] following admission at [facility] for JIM requiring 2 runs of HD. He has history of DVT in 2013 and had IVC filter placed at that time. Patient denies dyspnea or chest pain. The hospitalist have been asked to admit.    Past Medical History/Comorbidities:   Mr. Estanislao Goodpasture  has a past medical history of Calculus of kidney; Diabetes ([facility] [address] and DVT (deep venous thrombosis) ([facility] [address] (2013). Mr. Ryan Wells  has a past surgical history that includes urological.  Social History/Living Environment:   Home Environment: Skilled nursing facility  One/Two Story Residence: One story  Living Alone: No  Support Systems: Spouse/Significant Other/Partner  Patient Expects to be Discharged to[de-identified] Private residence  Current DME Used/Available at Home: None  Tub or Shower Type: Tub/Shower combination  Prior Level of Function/Work/Activity:  Mostly independent      Number of Personal Factors/Comorbidities that affect the Plan of Care: Expanded review of therapy/medical records (1-2):  MODERATE COMPLEXITY   ASSESSMENT OF OCCUPATIONAL PERFORMANCE[de-identified]   Activities of Daily Living:           Basic ADLs (From Assessment) Complex ADLs (From Assessment)   Basic ADL  Feeding: Minimum assistance  Oral Facial Hygiene/Grooming: Minimum assistance  Bathing: Maximum assistance  Upper Body Dressing: Contact guard assistance  Lower Body Dressing: Maximum assistance  Toileting: Maximum assistance     Grooming/Bathing/Dressing Activities of Daily Living     Cognitive Retraining  Safety/Judgement: Awareness of environment; Insight into deficits                       Bed/Mat Mobility  Supine to Sit: Maximum assistance;Assist x2  Sit to Supine: Maximum assistance;Assist x2  Scooting: Total assistance;Assist x2          Most Recent Physical Functioning:   Gross Assessment:                  Posture:  Posture (WDL): Exceptions to WDL  Posture Assessment: Forward head, Rounded shoulders  Balance:    Bed Mobility:  Supine to Sit: Maximum assistance;Assist x2  Sit to Supine: Maximum assistance;Assist x2  Scooting:  Total assistance;Assist x2  Wheelchair Mobility:     Transfers:                    Patient Vitals for the past 6 hrs:   BP SpO2 Pulse   03/31/17 1452 128/52 93 % 81        Mental Status  Neurologic State: Alert  Orientation Level: Oriented X4  Cognition: Appropriate decision making, Appropriate for age attention/concentration, Follows commands  Perception: Appears intact  Perseveration: No perseveration noted  Safety/Judgement: Awareness of environment, Insight into deficits                               Physical Skills Involved:  1. Range of Motion  2. Balance  3. Mobility  4. Strength  5. Endurance  6. Fine or Gross Motor Coordination Cognitive Skills Affected (resulting in the inability to perform in a timely and safe manner):  1. Attending  2. Problem Solving Psychosocial Skills Affected:  1. Interpersonal Interactions  2. Habits  3. Routines and Behaviors  4. Active Use of Coping Strategies  5. Environmental Adaptations   Number of elements that affect the Plan of Care: 5+:  HIGH COMPLEXITY   CLINICAL DECISION MAKIN [facility] Mobility Inpatient Short Form  How much help from another person does the patient currently need. .. Total A Lot A Little None   1. Putting on and taking off regular lower body clothing? [X] 1   [ ] 2   [ ] 3   [ ] 4   2. Bathing (including washing, rinsing, drying)? [ ] 1   [X] 2   [ ] 3   [ ] 4   3. Toileting, which includes using toilet, bedpan or urinal?   [X] 1   [ ] 2   [ ] 3   [ ] 4   4. Putting on and taking off regular upper body clothing?   [ ] 1   [X] 2   [ ] 3   [ ] 4   5. Taking care of personal grooming such as brushing teeth? [ ] 1   [ ] 2   [X] 3   [ ] 4   6. Eating meals? [ ] 1   [ ] 2   [X] 3   [ ] 4   © , Trustees of [address] [place] [address], under license to [employer]. All rights reserved    Score:  Initial: 12 Most Recent: X (Date: -- )     Interpretation of Tool:  Represents activities that are increasingly more difficult (i.e. Bed mobility, Transfers, Gait).        Score 24 23 22-20 19-15 14-10 9-7 6       Modifier CH CI CJ CK CL CM CN         · Self Care:               - CURRENT STATUS:    CL - 60%-79% impaired, limited or restricted               - GOAL STATUS:           CK - 40%-59% impaired, limited or restricted               - D/C STATUS:                       ---------------To be determined---------------  [place] / Product Type: Medicare /       Medical Necessity:     · Patient demonstrates fair rehab potential due to higher previous functional level. Reason for Services/Other Comments:  · Patient continues to require skilled intervention due to patient unable to attend/participate in therapy as expected. Use of outcome tool(s) and clinical judgement create a POC that gives a: MODERATE COMPLEXITY             TREATMENT:   (In addition to Assessment/Re-Assessment sessions the following treatments were rendered)      Pre-treatment Symptoms/Complaints:  Decreased ability to perform ADLs, self care, and functional mobility; decreased tolerance for activities  Pain: Initial:   Pain Intensity 1: 6  Pain Location 1: Leg  Pain Orientation 1: Posterior, Left, Right  Pain Intervention(s) 1: Repositioned, Relaxation technique, Nurse notified  Post Session:  6/10      Therapeutic Activity: (    30): Therapeutic activities including Bed transfers, static sitting balance, sitting tolerance to improve mobility, strength and balance. Required maximal assistance to perform bed mobility and minimal verbal and tactile cueing promote static balance in sitting. Braces/Orthotics/Lines/Etc:   · room air  Treatment/Session Assessment:    · Response to Treatment:  Agrees to therapy  · Interdisciplinary Collaboration:  · Occupational Therapist, Registered Nurse and Rehabilitation Attendant  · After treatment position/precautions:  · Supine in bed, Bed/Chair-wheels locked, Bed in low position, Caregiver at bedside, Call light within reach, RN notified and Side rails x 3  · Compliance with Program/Exercises: Will assess as treatment progresses. · Recommendations/Intent for next treatment session:   \"Next visit will focus on advancements to more challenging activities and reduction in assistance provided\".   Total Treatment Duration:  OT Patient Time In/Time Out  Time In: 1345  Time Out: Estelle 58

## 2017-03-31 NOTE — PROGRESS NOTES
Attempt made by this nurse and another nurse for a new IV site. No success,  MD still needing to continue IV antibiotics. Call has been placed to Vascular for assistance.

## 2017-03-31 NOTE — PROGRESS NOTES
SW spoke with pt this morning for continued discharge planning. Pt continues to be adamant that 9th floor is the only place he is willing to go for rehab, pt states he will not go back to [facility]. SW encouraged pt to consider other STR options in the event that he is unable to go to 9th - pt deferred to his spouse for decisions. SNOW met with spouse later this same date: Pamela: [phone] Cell [phone]. Spouse reiterates that they are only willing to accept 9th floor IRC. SW attempted to advise that pt would have to meet criteria and in the event that pt is unable to be accepted we would need an alternate  plan in place. Spouse stated that her  would be able to participate in therapies once his \"medical problems\" are fixed. Spouse was unwilling to consider any STR options as a Plan B option and attempted to advise this writer of her reason for not considering each STR in the area-   Spouse would like to talk to [facility] [place] [facility] as available and asked that we make her contact numbers known to physician for call back as schedule permits. Spouse stated that if pt can not go to 9th her plan is to take him home- with outpatient therapy? ?

## 2017-03-31 NOTE — PROGRESS NOTES
Problem: Mobility Impaired (Adult and Pediatric)  Goal: *Acute Goals and Plan of Care (Insert Text)  STG:  (1.)Mr. Jorge Alfaro will move from supine to sit and sit to supine , scoot up and down and roll side to side with MINIMAL ASSIST within 5 day(s). (2.)Mr. Jorge Alfaro will transfer from bed to chair and chair to bed with MODERATE ASSIST using the least restrictive device within 5 day(s). (3.)Mr. Jorge Alafro will ambulate with MODERATE ASSIST for 5 feet with the least restrictive device within 5 day(s). (4.)Mr. Jorge Alfaro will tolerate 25 minutes of therapeutic activity/exercise within 5 days in order to Improve activity tolerance for mobility. (5.)Mr. Jorge Alfaro will perform LE exercises with 1 to 2 cues for form within 3 days to improve strength for functional transfers and ambulation. LTG:  (1.)Mr. Jorge Alfaro will move from supine to sit and sit to supine , scoot up and down and roll side to side in bed with CONTACT GUARD ASSIST within 10 day(s). (2.)Mr. Jorge Alfaro will transfer from bed to chair and chair to bed with MINIMAL ASSIST using the least restrictive device within 10 day(s). (3.)Mr. Jorge Alfaro will ambulate with MINIMAL ASSIST for 25 feet with the least restrictive device within 10 day(s). Goals to be updated as patient progresses. ________________________________________________________________________________________________      PHYSICAL THERAPY: Daily Note, Treatment Day: 4th and PM 3/31/2017  INPATIENT: Hospital Day: 11  [place] / Product Type: Medicare /      NAME/AGE/GENDER: Francisca Patterson is a 66 y.o. male            PRIMARY DIAGNOSIS: DVT, lower extremity, proximal, acute, bilateral (HCC) DVT, lower extremity, proximal, acute (HCC) DVT, lower extremity, proximal, acute ([facility] [address]        ICD-10: Treatment Diagnosis:       · Difficulty in walking, Not elsewhere classified (R26.2)   Precaution/Allergies:  Sulfite       ASSESSMENT:      Mr. Jorge Alfaro is a 66 y.o. male admitted with extensive DVTs of the LEs. Pt today has continued complaints of LE pain (L>R) . He instructs this writer to assist with bed mobility by only touching his feet not his calves. He is supine but is agreeable to therapy. Performs rolling with supervision with additional time for task completion. Able to move LLE slowly but independently and RLE with some assistance. Sits to EOB unsupported after set up and  performed BLE exercises. After treatment patient returned to supine. Positioned comfortably in bed with needs within reach. Progress demonstrated. Patient very tearful during the treatment session. Will continue POC and PT efforts. This section established at most recent assessment   PROBLEM LIST (Impairments causing functional limitations):  1. Decreased Strength  2. Decreased ADL/Functional Activities  3. Decreased Transfer Abilities  4. Decreased Ambulation Ability/Technique  5. Decreased Balance  6. Increased Pain  7. Decreased Knowledge of Precautions  8. Decreased [place] with Home Exercise Program    INTERVENTIONS PLANNED: (Benefits and precautions of physical therapy have been discussed with the patient.)  1. Balance Exercise  2. Bed Mobility  3. Family Education  4. Gait Training  5. Home Exercise Program (HEP)  6. Therapeutic Activites  7. Therapeutic Exercise/Strengthening  8. Transfer Training  9. Patient Education  10. Group Therapy      TREATMENT PLAN: Frequency/Duration: 3 times a week for duration of hospital stay  Rehabilitation Potential For Stated Goals: FAIR      RECOMMENDED REHABILITATION/EQUIPMENT: (at time of discharge pending progress): Continue Skilled Therapy and Rehab. HISTORY:   History of Present Injury/Illness (Reason for Referral):  Per MD Note: \"Eliud Villa is a 66 y.o. male that presented to the ED with 2 week history of worsening swelling of the bilateral lower extremities with increasing difficulty with ambulation.  He had OP US today showing extensive DVT. He is currently undergoing rehab at [facility] following admission at [facility] for JIM requiring 2 runs of HD. He has history of DVT in 2013 and had IVC filter placed at that time. Patient denies dyspnea or chest pain. The hospitalist have been asked to admit. \"  Past Medical History/Comorbidities:   Mr. Jorge Alfaro  has a past medical history of Calculus of kidney; Diabetes ([facility] [address] and DVT (deep venous thrombosis) ([facility] [address] (2013). Mr. Jorge Alfaro  has a past surgical history that includes urological.  Social History/Living Environment:   Home Environment: Skilled nursing facility  One/Two Story Residence: One story  Living Alone: No  Support Systems: Spouse/Significant Other/Partner  Patient Expects to be Discharged to[de-identified] Private residence  Current DME Used/Available at Home: None  Tub or Shower Type: Tub/Shower combination  Prior Level of Function/Work/Activity:  Patient ambulatory prior to initial hospitalization. Unsure of current \"baseline\" at rehab. Number of Personal Factors/Comorbidities that affect the Plan of Care: 1-2: MODERATE COMPLEXITY   EXAMINATION:   Most Recent Physical Functioning:   Gross Assessment:                  Posture:     Balance:  Sitting: Intact  Sitting - Static: Good (unsupported)  Sitting - Dynamic: Good (unsupported)  Standing:  (n/a) Bed Mobility:  Rolling: Supervision  Supine to Sit: Minimum assistance  Sit to Supine: Minimum assistance  Scooting: Supervision  Wheelchair Mobility:     Transfers:     Gait:             Body Structures Involved:  1. Heart  2. Lungs  3. Muscles Body Functions Affected:  1. Sensory/Pain  2. Hematological  3. Neuromusculoskeletal  4. Movement Related Activities and Participation Affected:  1. Mobility  2. Self Care  3. Domestic Life  4. Interpersonal Interactions and Relationships  5.  Community, Social and [place]   Number of elements that affect the Plan of Care: 4+: HIGH COMPLEXITY   CLINICAL PRESENTATION:   Presentation: Evolving clinical presentation with changing clinical characteristics: MODERATE COMPLEXITY   CLINICAL DECISION MAKIN [facility] Mobility Inpatient Short Form  How much difficulty does the patient currently have. .. Unable A Lot A Little None   1. Turning over in bed (including adjusting bedclothes, sheets and blankets)? [ ] 1   [X] 2   [ ] 3   [ ] 4   2. Sitting down on and standing up from a chair with arms ( e.g., wheelchair, bedside commode, etc.)   [X] 1   [ ] 2   [ ] 3   [ ] 4   3. Moving from lying on back to sitting on the side of the bed? [ ] 1   [X] 2   [ ] 3   [ ] 4   How much help from another person does the patient currently need. .. Total A Lot A Little None   4. Moving to and from a bed to a chair (including a wheelchair)? [X] 1   [ ] 2   [ ] 3   [ ] 4   5. Need to walk in hospital room? [X] 1   [ ] 2   [ ] 3   [ ] 4   6. Climbing 3-5 steps with a railing? [X] 1   [ ] 2   [ ] 3   [ ] 4   © , Trustees of [address] [place] [address], under license to [employer]. All rights reserved    Score:  Initial: 8 Most Recent: X (Date: -- )     Interpretation of Tool:  Represents activities that are increasingly more difficult (i.e. Bed mobility, Transfers, Gait). Score 24 23 22-20 19-15 14-10 9-7 6       Modifier CH CI CJ CK CL CM CN         · Mobility - Walking and Moving Around:               - CURRENT STATUS:    CM - 80%-99% impaired, limited or restricted               - GOAL STATUS:           CL - 60%-79% impaired, limited or restricted               - D/C STATUS:                       ---------------To be determined---------------  [place] / Product Type: Medicare /       Medical Necessity:     · Patient demonstrates good rehab potential due to higher previous functional level.   Reason for Services/Other Comments:  · Patient continues to require modification of therapeutic interventions to increase complexity of exercises. Use of outcome tool(s) and clinical judgement create a POC that gives a: Questionable prediction of patient's progress: MODERATE COMPLEXITY                 TREATMENT:      Pre-treatment Symptoms/Complaints:  \"sure. Come on in\"  Pain: Initial:     Post Session:  Right calf pain 3/10      Therapeutic Activity: (   33 minutes): Therapeutic activities including Bed mobility (sit to supine), seated balance and positioning/scooting, BLE exercises to improve mobility, strength, balance, coordination and activity tolerance. Required minimum assist and some  verbal/manual cues to promote static balance in standing and promote coordination of bilateral, lower extremity(s). Date:  3/27/17 Date:  3/30/17 Date:     Activity/Exercise Parameters Parameters Parameters   AAROM L heel slides 1x8     AAROM L hip abduction 1x8     Ankle pumps 1x10 1x10    Seated LAQ  1x10    Seated hip flexion  1x10                       Braces/Orthotics/Lines/Etc:   · magana catheter  Treatment/Session Assessment:    · Response to Treatment: tolerated well  · Interdisciplinary Collaboration:  · Physical Therapy Assistant and Registered Nurse  · After treatment position/precautions:  · Supine in bed, Bed/Chair-wheels locked, Bed in low position, Call light within reach and RN notified  · Compliance with Program/Exercises: Will assess as treatment progresses. · Recommendations/Intent for next treatment session: \"Next visit will focus on advancements to more challenging activities and reduction in assistance provided\".   Total Treatment Duration:  PT Patient Time In/Time Out  Time In: 1640  Time Out: 1713     [facility]

## 2017-04-01 LAB
ALBUMIN SERPL BCP-MCNC: 1.9 G/DL (ref 3.2–4.6)
ALBUMIN/GLOB SERPL: 0.5 {RATIO} (ref 1.2–3.5)
ALP SERPL-CCNC: 108 U/L (ref 50–136)
ALT SERPL-CCNC: 13 U/L (ref 12–65)
ANION GAP BLD CALC-SCNC: 8 MMOL/L (ref 7–16)
AST SERPL W P-5'-P-CCNC: 25 U/L (ref 15–37)
BASOPHILS # BLD AUTO: 0 K/UL (ref 0–0.2)
BASOPHILS # BLD: 1 % (ref 0–2)
BILIRUB SERPL-MCNC: 0.6 MG/DL (ref 0.2–1.1)
BUN SERPL-MCNC: 18 MG/DL (ref 8–23)
CALCIUM SERPL-MCNC: 8.3 MG/DL (ref 8.3–10.4)
CHLORIDE SERPL-SCNC: 104 MMOL/L (ref 98–107)
CO2 SERPL-SCNC: 28 MMOL/L (ref 21–32)
CREAT SERPL-MCNC: 0.88 MG/DL (ref 0.8–1.5)
DIFFERENTIAL METHOD BLD: ABNORMAL
EOSINOPHIL # BLD: 0.3 K/UL (ref 0–0.8)
EOSINOPHIL NFR BLD: 3 % (ref 0.5–7.8)
ERYTHROCYTE [DISTWIDTH] IN BLOOD BY AUTOMATED COUNT: 17.5 % (ref 11.9–14.6)
GLOBULIN SER CALC-MCNC: 3.6 G/DL (ref 2.3–3.5)
GLUCOSE BLD STRIP.AUTO-MCNC: 108 MG/DL (ref 65–100)
GLUCOSE BLD STRIP.AUTO-MCNC: 109 MG/DL (ref 65–100)
GLUCOSE BLD STRIP.AUTO-MCNC: 119 MG/DL (ref 65–100)
GLUCOSE BLD STRIP.AUTO-MCNC: 132 MG/DL (ref 65–100)
GLUCOSE SERPL-MCNC: 109 MG/DL (ref 65–100)
HCT VFR BLD AUTO: 27.4 % (ref 41.1–50.3)
HGB BLD-MCNC: 8.4 G/DL (ref 13.6–17.2)
IMM GRANULOCYTES # BLD: 0.1 K/UL (ref 0–0.5)
IMM GRANULOCYTES NFR BLD AUTO: 1.5 % (ref 0–5)
INR PPP: 2.9 (ref 0.9–1.2)
LYMPHOCYTES # BLD AUTO: 16 % (ref 13–44)
LYMPHOCYTES # BLD: 1.2 K/UL (ref 0.5–4.6)
MCH RBC QN AUTO: 29.2 PG (ref 26.1–32.9)
MCHC RBC AUTO-ENTMCNC: 30.7 G/DL (ref 31.4–35)
MCV RBC AUTO: 95.1 FL (ref 79.6–97.8)
MONOCYTES # BLD: 0.7 K/UL (ref 0.1–1.3)
MONOCYTES NFR BLD AUTO: 9 % (ref 4–12)
NEUTS SEG # BLD: 5.2 K/UL (ref 1.7–8.2)
NEUTS SEG NFR BLD AUTO: 70 % (ref 43–78)
PLATELET # BLD AUTO: 298 K/UL (ref 150–450)
PMV BLD AUTO: 9 FL (ref 10.8–14.1)
POTASSIUM SERPL-SCNC: 3.9 MMOL/L (ref 3.5–5.1)
PROT SERPL-MCNC: 5.5 G/DL (ref 6.3–8.2)
PROTHROMBIN TIME: 32.3 SEC (ref 9.6–12)
RBC # BLD AUTO: 2.88 M/UL (ref 4.23–5.67)
SODIUM SERPL-SCNC: 140 MMOL/L (ref 136–145)
WBC # BLD AUTO: 7.5 K/UL (ref 4.3–11.1)

## 2017-04-01 PROCEDURE — 74011250637 HC RX REV CODE- 250/637: Performed by: INTERNAL MEDICINE

## 2017-04-01 PROCEDURE — 77030027138 HC INCENT SPIROMETER -A

## 2017-04-01 PROCEDURE — 74011000258 HC RX REV CODE- 258: Performed by: INTERNAL MEDICINE

## 2017-04-01 PROCEDURE — 36415 COLL VENOUS BLD VENIPUNCTURE: CPT | Performed by: INTERNAL MEDICINE

## 2017-04-01 PROCEDURE — 74011250636 HC RX REV CODE- 250/636: Performed by: INTERNAL MEDICINE

## 2017-04-01 PROCEDURE — 74011250637 HC RX REV CODE- 250/637: Performed by: PHYSICAL MEDICINE & REHABILITATION

## 2017-04-01 PROCEDURE — 85025 COMPLETE CBC W/AUTO DIFF WBC: CPT | Performed by: NURSE PRACTITIONER

## 2017-04-01 PROCEDURE — 80053 COMPREHEN METABOLIC PANEL: CPT | Performed by: INTERNAL MEDICINE

## 2017-04-01 PROCEDURE — 82962 GLUCOSE BLOOD TEST: CPT

## 2017-04-01 PROCEDURE — 85610 PROTHROMBIN TIME: CPT | Performed by: INTERNAL MEDICINE

## 2017-04-01 PROCEDURE — 65270000029 HC RM PRIVATE

## 2017-04-01 RX ORDER — OXYCODONE HYDROCHLORIDE 5 MG/1
20 TABLET ORAL EVERY 6 HOURS
Status: DISCONTINUED | OUTPATIENT
Start: 2017-04-01 | End: 2017-04-03

## 2017-04-01 RX ADMIN — FAMOTIDINE 20 MG: 20 TABLET, FILM COATED ORAL at 09:58

## 2017-04-01 RX ADMIN — OXYCODONE HYDROCHLORIDE 20 MG: 5 TABLET ORAL at 17:57

## 2017-04-01 RX ADMIN — OXYCODONE HYDROCHLORIDE 10 MG: 10 TABLET, FILM COATED, EXTENDED RELEASE ORAL at 22:09

## 2017-04-01 RX ADMIN — Medication 10 ML: at 22:12

## 2017-04-01 RX ADMIN — TAMSULOSIN HYDROCHLORIDE 0.4 MG: 0.4 CAPSULE ORAL at 09:58

## 2017-04-01 RX ADMIN — OXYCODONE HYDROCHLORIDE 10 MG: 10 TABLET, FILM COATED, EXTENDED RELEASE ORAL at 09:58

## 2017-04-01 RX ADMIN — OXYCODONE HYDROCHLORIDE 10 MG: 5 TABLET ORAL at 00:10

## 2017-04-01 RX ADMIN — Medication 10 ML: at 06:00

## 2017-04-01 RX ADMIN — CITALOPRAM HYDROBROMIDE 20 MG: 20 TABLET ORAL at 09:58

## 2017-04-01 RX ADMIN — RDII 250 MG CAPSULE 250 MG: at 17:57

## 2017-04-01 RX ADMIN — CEFTRIAXONE 1 G: 1 INJECTION, POWDER, FOR SOLUTION INTRAMUSCULAR; INTRAVENOUS at 17:58

## 2017-04-01 RX ADMIN — OXYCODONE HYDROCHLORIDE 10 MG: 5 TABLET ORAL at 06:01

## 2017-04-01 RX ADMIN — RDII 250 MG CAPSULE 250 MG: at 09:58

## 2017-04-01 RX ADMIN — OLMESARTAN MEDOXOMIL 40 MG: 40 TABLET, FILM COATED ORAL at 09:58

## 2017-04-01 RX ADMIN — FAMOTIDINE 20 MG: 20 TABLET, FILM COATED ORAL at 17:57

## 2017-04-01 RX ADMIN — WARFARIN SODIUM 3 MG: 2 TABLET ORAL at 22:13

## 2017-04-01 RX ADMIN — Medication 10 ML: at 14:00

## 2017-04-01 RX ADMIN — OXYCODONE HYDROCHLORIDE 20 MG: 5 TABLET ORAL at 12:28

## 2017-04-01 RX ADMIN — GABAPENTIN 100 MG: 100 CAPSULE ORAL at 22:09

## 2017-04-01 NOTE — PROGRESS NOTES
Inpatient Hematology / Oncology Progress Note      Admission Date: 3/21/2017  7:10 PM  Reason for Admission/Hospital Course: DVT, lower extremity, proximal, acute, bilateral (HCC)      24 Hour Events:  Feeling a little better today  Still has right leg pain and concerned that he cannot participate in PT because of it. There is a 3.5 cm hematoma in left forearm. [place] shows non-occlusive thrombi in both LEs.             ROS:  Constitutional: Positive for fatigue and weakness; negative for fever, chills. CV: Negative for chest pain, palpitations, edema. Respiratory: Negative for dyspnea, cough, wheezing. GI: Negative for nausea, abdominal pain, diarrhea. 10 point review of systems is otherwise negative with the exception of the elements mentioned above in the HPI. Allergies   Allergen Reactions    Sulfite Hives       OBJECTIVE:  Patient Vitals for the past 8 hrs:   BP Temp Pulse Resp SpO2   17 1140 120/70 98.4 °F (36.9 °C) 81 18 93 %   17 0754 119/43 97.8 °F (36.6 °C) 77 18 91 %   17 0449 122/55 97.4 °F (36.3 °C) 77 20 94 %     Temp (24hrs), Av.9 °F (36.6 °C), Min:97.4 °F (36.3 °C), Max:98.4 °F (36.9 °C)    701 -  1900  In: 120 [P.O.:120]  Out: -     Physical Exam:  Constitutional: Well developed, well nourished male in no acute distress, lying comfortably in the hospital bed. HEENT: Normocephalic and atraumatic. Oropharynx is clear, mucous membranes are moist. Sclerae anicteric. Neck supple without JVD. No thyromegaly present. Skin Warm and dry. No rash noted. No erythema. No pallor. Bruising to right arm-interior. Hematoma and pain to Lt medial FA. Respiratory Lungs are clear to auscultation bilaterally without wheezes, rales or rhonchi, normal air exchange without accessory muscle use. CVS Normal rate, regular rhythm and normal S1 and S2. No murmurs, gallops, or rubs. Abdomen Soft, nontender and nondistended, normoactive bowel sounds.   No palpable mass. Neuro Grossly nonfocal with no obvious sensory or motor deficits. MSK Normal range of motion in general.  BLE edema with tenderness. Psych Appropriate mood and affect.         Labs:      Recent Labs      04/01/17   0708  03/31/17   0745  03/30/17   0602   WBC  7.5  10.0  9.7   RBC  2.88*  2.93*  2.84*   HGB  8.4*  8.6*  8.4*   HCT  27.4*  27.7*  26.6*   MCV  95.1  94.5  93.7   MCH  29.2  29.4  29.6   MCHC  30.7*  31.0*  31.6   RDW  17.5*  17.1*  17.5*   PLT  298  322  310   GRANS  70  77  76   LYMPH  16  13  11*   MONOS  9  8  11   EOS  3  1  1   BASOS  1  0  0   IG  1.5  0.9  0.9   DF  AUTOMATED  AUTOMATED  AUTOMATED   ANEU  5.2  7.6  7.4   ABL  1.2  1.3  1.1   ABM  0.7  0.8  1.0   ANNIE  0.3  0.1  0.1   ABB  0.0  0.0  0.0   AIG  0.1  0.1  0.1        Recent Labs      04/01/17   0708  03/31/17   0745  03/30/17   0602   NA  140  139  140   K  3.9  3.9  4.4   CL  104  104  105   CO2  28  26  28   AGAP  8  9  7   GLU  109*  114*  103*   BUN  18  17  15   CREA  0.88  0.91  0.91   GFRAA  >60  >60  >60   GFRNA  >60  >60  >60   CA  8.3  8.3  8.4   SGOT  25  26  20   AP  108  109  96   TP  5.5*  5.7*  5.6*   ALB  1.9*  2.0*  1.9*   GLOB  3.6*  3.7*  3.7*   AGRAT  0.5*  0.5*  0.5*         Imaging:      ASSESSMENT:    Problem List  Date Reviewed: 3/21/2017          Codes Class Noted    * (Principal)DVT, lower extremity, proximal, acute ([facility] ICD-10-CM: I82.4Y9  ICD-9-CM: 453.41  3/21/2017        Benign essential HTN ICD-10-CM: I10  ICD-9-CM: 401.1  9/13/2016        Type 2 diabetes mellitus without complication (HCC) [id number]: E11.9  ICD-9-CM: 250.00  7/22/2016        Benign non-nodular prostatic hyperplasia without lower urinary tract symptoms ICD-10-CM: N40.0  ICD-9-CM: 600.90  7/22/2016        Hypertriglyceridemia ICD-10-CM: E78.1  ICD-9-CM: 272.1  7/22/2016        Corporo-venous occlusive erectile dysfunction ICD-10-CM: N52.02  ICD-9-CM: 607.84  7/22/2016        Depression ICD-10-CM: F32.9  ICD-9-CM: 311  7/22/2016        Obstructive sleep apnea syndrome ICD-10-CM: G47.33  ICD-9-CM: 327.23  7/22/2016    Overview Signed 3/21/2017 10:40 PM by BIJAN Feldman     Home CPAP             Morbid obesity due to excess calories [facility]) ICD-10-CM: E66.01  ICD-9-CM: 278.01  7/22/2016        Primary insomnia ICD-10-CM: F51.01  ICD-9-CM: 307.42  7/22/2016                PLAN:    -Acute Anemia  Check Haptoglobin, Iron studies, Vitamin B12, Folate, Retic Count, Guiac-stool  We also ordered a xray of his left FA due to his pain that he states continues since his fall, denies previous imaging to that area. 3/29: will obtain abdominal CT to assess for any bleeding for reason of anemia, B12 and folate are normal, retic count is elevated at 6.2, which we find to be a positive sign of increased production, Haptoglobin is normal at 128, Cronin Level has yet to be obtained  3/30: No bleeding on CT, pt now has hematoma and persistent pain to Lt medial FA, will obtain MRI to determine if source of bleed, Hgb is stable at 8.4 today, INR 2.5 will restart Coumadin. There has been no change in edema and pain to his LEs, will repeat dopplers. 4/1/17 His counts are stable on current CBC while on warfarin. He has what appears to be a resolving hematoma in his left arm, perhaps in part associated with his prior drop in HCT. Not surprisingly he has some phlebitic pain in his right leg. Will adjust meds. He might also benefit from low dose ASA for phlebitic process.               Alyssa Lr MD   [facility] Hematology Oncology  [address]  Office : [phone]  Fax : [phone]

## 2017-04-01 NOTE — PROGRESS NOTES
Hospitalist Progress Note    Patient: Roselie Siemens MRN: [id number]  SSN: [id number]    YOB: 1945  Age: 66 y.o. Sex: male      Admit Date: 3/21/2017    LOS: 11 days     Subjective:     66year old CM with a PMH of HTN, depression, DM2, ELIDIA, and questionable history of DVT/PE(?) since he has an IVC filter was recently at [facility] for compression fracture after a mechanical fall and was sent to rehab. While at rehab the patient's legs started swell so they sent him to see his PCP who did a doppler and discovered bilateral DVTs up to the iliac veins. He was admitted and received direct TPA by IR and has been on a Heparin bridge with Coumadin. His Hgb continued to drop down to 6.3 before getting 1U PRBC and hematology was consulted. 3/29 - The patient continues to have LUE swelling and pain despite removing IV and icing. States that his legs and back hurt too much to get out of bed. Sat up to side of bed with PT, but could not stand. 3/30 - The patient complains of bladder spasms and left forearm pain today. He continues to refuse to try to work with PT stating that it hurts too much and his legs are too heavy. I stressed the importance of working with PT as he has no medical reasons not to do this. 3/31 - The patient states taht his bladder/urethral spasms are worse today. Left forearm more tender. He states that he wants to get to the chair today. No F/C/N/V. No CP/SOB. 4/1 - No new acute complaints. He continues to refuse to try to slide to chair or participate in getting up. He will try to sit to side of bed but says it hurts too much. Review of systems negative except stated above.     Objective:     Vitals:    04/01/17 0449 04/01/17 0754 04/01/17 1140 04/01/17 1538   BP: 122/55 119/43 120/70 130/51   Pulse: 77 77 81 81   Resp: 20 18 18 18   Temp: 97.4 °F (36.3 °C) 97.8 °F (36.6 °C) 98.4 °F (36.9 °C) 98.7 °F (37.1 °C)   SpO2: 94% 91% 93% 92%   Weight:       Height:            Intake and Output: Not Accurate    Physical Exam:   GENERAL: alert, cooperative, no distress, appears stated age  EYE: conjunctivae/corneas clear. PERRL. THROAT & NECK: normal and no erythema or exudates noted. LUNG: clear to auscultation bilaterally  HEART: regular rate and rhythm, S1S2, no murmur, no JVD  ABDOMEN: soft, non-tender, non-distended. Bowel sounds normal.   EXTREMITIES:  3+ bilateral LE edema, LUE swollen and tender  SKIN: Ecchymoses on RUE  NEUROLOGIC: A&Ox3. Cranial nerves 2-12 grossly intact. Lab/Data Review:  BMP:   Lab Results   Component Value Date/Time     04/01/2017 07:08 AM    K 3.9 04/01/2017 07:08 AM     04/01/2017 07:08 AM    CO2 28 04/01/2017 07:08 AM    AGAP 8 04/01/2017 07:08 AM     (H) 04/01/2017 07:08 AM    BUN 18 04/01/2017 07:08 AM    CREA 0.88 04/01/2017 07:08 AM    GFRAA >60 04/01/2017 07:08 AM    GFRNA >60 04/01/2017 07:08 AM     CBC:   Lab Results   Component Value Date/Time    WBC 7.5 04/01/2017 07:08 AM    HGB 8.4 (L) 04/01/2017 07:08 AM    HCT 27.4 (L) 04/01/2017 07:08 AM     04/01/2017 07:08 AM     COAGS:   Lab Results   Component Value Date/Time    PTP 32.3 (H) 04/01/2017 07:08 AM    INR 2.9 (H) 04/01/2017 07:08 AM        Assessment:     Principal Problem:    DVT, lower extremity, proximal, acute ([address] (3/21/2017)    Active Problems:    Type 2 diabetes mellitus without complication ([address] (3/68/7609)      Benign non-nodular prostatic hyperplasia without lower urinary tract symptoms (7/22/2016)      Hypertriglyceridemia (7/22/2016)      Depression (7/22/2016)      Obstructive sleep apnea syndrome (7/22/2016)      Overview: Home CPAP      Morbid obesity due to excess calories ([address] (7/22/2016)      Primary insomnia (7/22/2016)      Benign essential HTN (9/13/2016)        Plan:     - Check Hgb daily.  Currently stable  - Ceftriaxone for UTI - urine culture showing GNR  - Remove [place]  - Coumadin restarted - per pharmacy  - No LUE DVT - getting LUE U/S to look for source of bleeding into arm  - Oxycontin 10mg Q12H scheduled, Roxicodone Q6H  - Will need to work with PT  - Appreciate hematology's input and assitance - will need hypercoagulation w/o as OP?  - Appreciate PM&Rs input and assistance  - SW to assist with discharge planning    Signed By: Aamir Knox DO     April 1, 2017

## 2017-04-01 NOTE — PROGRESS NOTES
Warfarin dosing per pharmacist    Freedom Whitehead is a 66 y.o. male. Height: 5' 6\" (167.6 cm)    Weight: 126.2 kg (278 lb 4.8 oz)    Indication:  Bilateral LE DVT, thrombosed IVC    Goal INR:  2-3    Home dose:  New start    Risk factors or significant drug interactions: Other anticoagulants:  Heparin bridge - Stopped 3/29/17    Daily Monitoring  Date  INR     Warfarin dose HGB              Notes  3/24  1.8  5 mg  9.4  3/25  1.1  5 mg   ---  3/26  1.2  5 mg  7.1  3/27  1.7  4 mg  ---  3/28  2.0  4 mg  9.1  3/29  2.2  4 mg  ---  3/30  2.5  4 mg  8.4  3/31  2.7  4 mg  8.6  4/1  2.9  3 mg  8.4    Pharmacy consulted to dose coumadin on 3/24. Pt s/p EKOS and started on coumadin bridged with heparin. Pharmacy re-consulted on 3/30 after warfarin was briefly stopped as patient worked up for possible bleed. No doses missed though. INR with steady increase to 2.9 on 4 mg dose. Will change to 3 mg this evening. Following daily INR    Thank you,  Justina Rush, Pharm. D.   Clinical Pharmacist  [phone]

## 2017-04-02 LAB
ABO + RH BLD: NORMAL
ALBUMIN SERPL BCP-MCNC: 1.8 G/DL (ref 3.2–4.6)
ALBUMIN/GLOB SERPL: 0.5 {RATIO} (ref 1.2–3.5)
ALP SERPL-CCNC: 121 U/L (ref 50–136)
ALT SERPL-CCNC: 15 U/L (ref 12–65)
ANION GAP BLD CALC-SCNC: 6 MMOL/L (ref 7–16)
AST SERPL W P-5'-P-CCNC: 40 U/L (ref 15–37)
BACTERIA SPEC CULT: ABNORMAL
BACTERIA SPEC CULT: ABNORMAL
BASOPHILS # BLD AUTO: 0.1 K/UL (ref 0–0.2)
BASOPHILS # BLD: 1 % (ref 0–2)
BILIRUB SERPL-MCNC: 0.4 MG/DL (ref 0.2–1.1)
BLD PROD TYP BPU: NORMAL
BLOOD BAG HEMOLYSIS,BLBAG: NORMAL
BUN SERPL-MCNC: 19 MG/DL (ref 8–23)
CALCIUM SERPL-MCNC: 8.2 MG/DL (ref 8.3–10.4)
CHLORIDE SERPL-SCNC: 107 MMOL/L (ref 98–107)
CLERICAL ERRORS,CLERR: NORMAL
CO2 SERPL-SCNC: 30 MMOL/L (ref 21–32)
CREAT SERPL-MCNC: 1.03 MG/DL (ref 0.8–1.5)
DAT P TRANSF RBC QL: NORMAL
DAT RBC QL: NORMAL
DIFFERENTIAL METHOD BLD: ABNORMAL
EOSINOPHIL # BLD: 0.3 K/UL (ref 0–0.8)
EOSINOPHIL NFR BLD: 4 % (ref 0.5–7.8)
ERYTHROCYTE [DISTWIDTH] IN BLOOD BY AUTOMATED COUNT: 17.8 % (ref 11.9–14.6)
GLOBULIN SER CALC-MCNC: 4 G/DL (ref 2.3–3.5)
GLUCOSE BLD STRIP.AUTO-MCNC: 104 MG/DL (ref 65–100)
GLUCOSE BLD STRIP.AUTO-MCNC: 107 MG/DL (ref 65–100)
GLUCOSE BLD STRIP.AUTO-MCNC: 109 MG/DL (ref 65–100)
GLUCOSE SERPL-MCNC: 96 MG/DL (ref 65–100)
HCT VFR BLD AUTO: 28.4 % (ref 41.1–50.3)
HEMOLYSIS, POST TXN,PSTHE: NORMAL
HEMOLYSIS,PRE TXN,PREHE: NORMAL
HGB BLD-MCNC: 8.6 G/DL (ref 13.6–17.2)
IMM GRANULOCYTES # BLD: 0.2 K/UL (ref 0–0.5)
IMM GRANULOCYTES NFR BLD AUTO: 2.2 % (ref 0–5)
INR PPP: 2.9 (ref 0.9–1.2)
LYMPHOCYTES # BLD AUTO: 18 % (ref 13–44)
LYMPHOCYTES # BLD: 1.5 K/UL (ref 0.5–4.6)
MCH RBC QN AUTO: 29.3 PG (ref 26.1–32.9)
MCHC RBC AUTO-ENTMCNC: 30.3 G/DL (ref 31.4–35)
MCV RBC AUTO: 96.6 FL (ref 79.6–97.8)
MONOCYTES # BLD: 0.7 K/UL (ref 0.1–1.3)
MONOCYTES NFR BLD AUTO: 8 % (ref 4–12)
NEUTS SEG # BLD: 5.2 K/UL (ref 1.7–8.2)
NEUTS SEG NFR BLD AUTO: 67 % (ref 43–78)
PATH REV BLD -IMP: NORMAL
PLATELET # BLD AUTO: 381 K/UL (ref 150–450)
PMV BLD AUTO: 9.3 FL (ref 10.8–14.1)
POTASSIUM SERPL-SCNC: 4 MMOL/L (ref 3.5–5.1)
PROT SERPL-MCNC: 5.8 G/DL (ref 6.3–8.2)
PROTHROMBIN TIME: 31.6 SEC (ref 9.6–12)
RBC # BLD AUTO: 2.94 M/UL (ref 4.23–5.67)
SERVICE CMNT-IMP: ABNORMAL
SODIUM SERPL-SCNC: 143 MMOL/L (ref 136–145)
UNIT NUMBER,UN: NORMAL
WBC # BLD AUTO: 7.9 K/UL (ref 4.3–11.1)

## 2017-04-02 PROCEDURE — 77030019605

## 2017-04-02 PROCEDURE — 74011250636 HC RX REV CODE- 250/636: Performed by: INTERNAL MEDICINE

## 2017-04-02 PROCEDURE — 74011250637 HC RX REV CODE- 250/637: Performed by: INTERNAL MEDICINE

## 2017-04-02 PROCEDURE — 36415 COLL VENOUS BLD VENIPUNCTURE: CPT | Performed by: INTERNAL MEDICINE

## 2017-04-02 PROCEDURE — 74011000258 HC RX REV CODE- 258: Performed by: INTERNAL MEDICINE

## 2017-04-02 PROCEDURE — 80053 COMPREHEN METABOLIC PANEL: CPT | Performed by: INTERNAL MEDICINE

## 2017-04-02 PROCEDURE — 82962 GLUCOSE BLOOD TEST: CPT

## 2017-04-02 PROCEDURE — 85025 COMPLETE CBC W/AUTO DIFF WBC: CPT | Performed by: NURSE PRACTITIONER

## 2017-04-02 PROCEDURE — 74011250637 HC RX REV CODE- 250/637: Performed by: PHYSICAL MEDICINE & REHABILITATION

## 2017-04-02 PROCEDURE — 97530 THERAPEUTIC ACTIVITIES: CPT

## 2017-04-02 PROCEDURE — 85610 PROTHROMBIN TIME: CPT | Performed by: INTERNAL MEDICINE

## 2017-04-02 PROCEDURE — 65270000029 HC RM PRIVATE

## 2017-04-02 RX ORDER — DIPHENHYDRAMINE HCL 25 MG
25 CAPSULE ORAL
Status: DISCONTINUED | OUTPATIENT
Start: 2017-04-02 | End: 2017-04-04 | Stop reason: HOSPADM

## 2017-04-02 RX ADMIN — OLMESARTAN MEDOXOMIL 40 MG: 40 TABLET, FILM COATED ORAL at 08:03

## 2017-04-02 RX ADMIN — GABAPENTIN 100 MG: 100 CAPSULE ORAL at 23:20

## 2017-04-02 RX ADMIN — Medication 10 ML: at 23:22

## 2017-04-02 RX ADMIN — OXYCODONE HYDROCHLORIDE 20 MG: 5 TABLET ORAL at 00:49

## 2017-04-02 RX ADMIN — OXYCODONE HYDROCHLORIDE 20 MG: 5 TABLET ORAL at 16:24

## 2017-04-02 RX ADMIN — TAMSULOSIN HYDROCHLORIDE 0.4 MG: 0.4 CAPSULE ORAL at 08:03

## 2017-04-02 RX ADMIN — OXYCODONE HYDROCHLORIDE 20 MG: 5 TABLET ORAL at 05:29

## 2017-04-02 RX ADMIN — OXYCODONE HYDROCHLORIDE 10 MG: 10 TABLET, FILM COATED, EXTENDED RELEASE ORAL at 08:03

## 2017-04-02 RX ADMIN — CEFTRIAXONE 1 G: 1 INJECTION, POWDER, FOR SOLUTION INTRAMUSCULAR; INTRAVENOUS at 16:24

## 2017-04-02 RX ADMIN — Medication 10 ML: at 14:00

## 2017-04-02 RX ADMIN — CITALOPRAM HYDROBROMIDE 20 MG: 20 TABLET ORAL at 08:04

## 2017-04-02 RX ADMIN — WARFARIN SODIUM 3 MG: 2 TABLET ORAL at 23:20

## 2017-04-02 RX ADMIN — FAMOTIDINE 20 MG: 20 TABLET, FILM COATED ORAL at 16:24

## 2017-04-02 RX ADMIN — FAMOTIDINE 20 MG: 20 TABLET, FILM COATED ORAL at 08:04

## 2017-04-02 RX ADMIN — Medication 10 ML: at 05:30

## 2017-04-02 RX ADMIN — OXYCODONE HYDROCHLORIDE 20 MG: 5 TABLET ORAL at 11:22

## 2017-04-02 NOTE — PROGRESS NOTES
Dressing to left calf changed. Old dressing saturated with serous drainage. No odor noted. Small  Abrasion to left buttock. allevyn applied.  Patient turning self, encouraged to stay on side

## 2017-04-02 NOTE — PROGRESS NOTES
Problem: Mobility Impaired (Adult and Pediatric)  Goal: *Acute Goals and Plan of Care (Insert Text)  STG:  (1.)Mr. Pretty Dubon will move from supine to sit and sit to supine , scoot up and down and roll side to side with MINIMAL ASSIST within 5 day(s). (2.)Mr. Pretty Dubon will transfer from bed to chair and chair to bed with MODERATE ASSIST using the least restrictive device within 5 day(s). (3.)Mr. Pretty Dubon will ambulate with MODERATE ASSIST for 5 feet with the least restrictive device within 5 day(s). (4.)Mr. Pretty Dubon will tolerate 25 minutes of therapeutic activity/exercise within 5 days in order to Improve activity tolerance for mobility. (5.)Mr. Pretty Dubon will perform LE exercises with 1 to 2 cues for form within 3 days to improve strength for functional transfers and ambulation. LTG:  (1.)Mr. Pretty Dubon will move from supine to sit and sit to supine , scoot up and down and roll side to side in bed with CONTACT GUARD ASSIST within 10 day(s). (2.)Mr. Pretty Dubon will transfer from bed to chair and chair to bed with MINIMAL ASSIST using the least restrictive device within 10 day(s). (3.)Mr. Pretty Dubon will ambulate with MINIMAL ASSIST for 25 feet with the least restrictive device within 10 day(s). Goals to be updated as patient progresses. ________________________________________________________________________________________________      PHYSICAL THERAPY: Daily Note, Treatment Day: 5th and AM 4/2/2017  INPATIENT: Hospital Day: 13  [place] / Product Type: Medicare /      NAME/AGE/GENDER: Josh Alberts is a 66 y.o. male            PRIMARY DIAGNOSIS: DVT, lower extremity, proximal, acute, bilateral (HCC) DVT, lower extremity, proximal, acute ([address] DVT, lower extremity, proximal, acute ([facility]        ICD-10: Treatment Diagnosis:       · Difficulty in walking, Not elsewhere classified (R26.2)   Precaution/Allergies:  Sulfite       ASSESSMENT:      Mr. Pretty Dubon is a 66 y.o. male admitted with extensive DVTs of the LEs. Pt today has continued complaints of LE pain (L>R) . He required min-modA x 2 to sit up at the edge of the bed with cues to use his arms to assist (able to move LLE slowly but independently and RLE with some assistance). He was able to sit up at the edge of the bed and scoot forward with min-modA x 2 for better position prior to standing. He then required mod-maxA x 2 to perform sit to stand and maintain standing for 10-15 seconds (x 3 bouts performed with 1-2 minute sitting rest breaks between). Did not attempt transfer to bedside chair due to pt leaning forward significantly and not able to obtain full upright position in standing even with maximal manual cues due to lower extremity pain. He returned to supine position at end of session with Juanito x 2. Pt required increased time throughout session due to anxiety and weakness. Will continue POC and PT efforts. This section established at most recent assessment   PROBLEM LIST (Impairments causing functional limitations):  1. Decreased Strength  2. Decreased ADL/Functional Activities  3. Decreased Transfer Abilities  4. Decreased Ambulation Ability/Technique  5. Decreased Balance  6. Increased Pain  7. Decreased Knowledge of Precautions  8. Decreased [place] with Home Exercise Program    INTERVENTIONS PLANNED: (Benefits and precautions of physical therapy have been discussed with the patient.)  1. Balance Exercise  2. Bed Mobility  3. Family Education  4. Gait Training  5. Home Exercise Program (HEP)  6. Therapeutic Activites  7. Therapeutic Exercise/Strengthening  8. Transfer Training  9. Patient Education  10. Group Therapy      TREATMENT PLAN: Frequency/Duration: 3 times a week for duration of hospital stay  Rehabilitation Potential For Stated Goals: FAIR      RECOMMENDED REHABILITATION/EQUIPMENT: (at time of discharge pending progress): Continue Skilled Therapy and Rehab.                    HISTORY:   History of Present Injury/Illness (Reason for Referral):  Per MD Note: \"Eliud King is a 66 y.o. male that presented to the ED with 2 week history of worsening swelling of the bilateral lower extremities with increasing difficulty with ambulation. He had OP US today showing extensive DVT. He is currently undergoing rehab at [facility] following admission at [address] for JIM requiring 2 runs of HD. He has history of DVT in 2013 and had IVC filter placed at that time. Patient denies dyspnea or chest pain. The hospitalist have been asked to admit. \"  Past Medical History/Comorbidities:   Mr. Tamy Hilton  has a past medical history of Calculus of kidney; Diabetes ([facility] and DVT (deep venous thrombosis) ([facility] (2013). Mr. Tamy Hilton  has a past surgical history that includes urological.  Social History/Living Environment:   Home Environment: Skilled nursing facility  One/Two Story Residence: One story  Living Alone: No  Support Systems: Spouse/Significant Other/Partner  Patient Expects to be Discharged to[de-identified] Private residence  Current DME Used/Available at Home: None  Tub or Shower Type: Tub/Shower combination  Prior Level of Function/Work/Activity:  Patient ambulatory prior to initial hospitalization. Unsure of current \"baseline\" at rehab. Number of Personal Factors/Comorbidities that affect the Plan of Care: 1-2: MODERATE COMPLEXITY   EXAMINATION:   Most Recent Physical Functioning:   Gross Assessment:                  Posture:     Balance:  Sitting: Intact  Sitting - Static: Good (unsupported)  Sitting - Dynamic: Good (unsupported)  Standing: Impaired;Pull to stand  Standing - Static: Poor  Standing - Dynamic : Poor Bed Mobility:  Rolling: Minimum assistance; Moderate assistance;Assist x2  Supine to Sit: Minimum assistance; Moderate assistance;Assist x2  Sit to Supine: Minimum assistance;Assist x2  Interventions: Tactile cues; Verbal cues;Manual cues; Safety awareness training (slight assist at B LEs and for upright trunk)  Wheelchair Mobility:     Transfers:  Sit to Stand: Moderate assistance;Maximum assistance;Assist x2  Stand to Sit: Moderate assistance;Assist x2  Bed to Chair: Other (comment) (not attempted due to decreased safety of pt in standing)  Gait:             Body Structures Involved:  1. Heart  2. Lungs  3. Muscles Body Functions Affected:  1. Sensory/Pain  2. Hematological  3. Neuromusculoskeletal  4. Movement Related Activities and Participation Affected:  1. Mobility  2. Self Care  3. Domestic Life  4. Interpersonal Interactions and Relationships  5. Community, Social and [place]   Number of elements that affect the Plan of Care: 4+: HIGH COMPLEXITY   CLINICAL PRESENTATION:   Presentation: Evolving clinical presentation with changing clinical characteristics: MODERATE COMPLEXITY   CLINICAL DECISION MAKIN [facility] Mobility Inpatient Short Form  How much difficulty does the patient currently have. .. Unable A Lot A Little None   1. Turning over in bed (including adjusting bedclothes, sheets and blankets)? [ ] 1   [X] 2   [ ] 3   [ ] 4   2. Sitting down on and standing up from a chair with arms ( e.g., wheelchair, bedside commode, etc.)   [X] 1   [ ] 2   [ ] 3   [ ] 4   3. Moving from lying on back to sitting on the side of the bed? [ ] 1   [X] 2   [ ] 3   [ ] 4   How much help from another person does the patient currently need. .. Total A Lot A Little None   4. Moving to and from a bed to a chair (including a wheelchair)? [X] 1   [ ] 2   [ ] 3   [ ] 4   5. Need to walk in hospital room? [X] 1   [ ] 2   [ ] 3   [ ] 4   6. Climbing 3-5 steps with a railing? [X] 1   [ ] 2   [ ] 3   [ ] 4   © , Trustees of [address] [place] [address] [address] [address], under license to [employer]. All rights reserved    Score:  Initial: 8 Most Recent: X (Date: -- )     Interpretation of Tool:  Represents activities that are increasingly more difficult (i.e. Bed mobility, Transfers, Gait).        Score 24 23 22-20 19-15 14-10 9-7 6       Modifier CH CI CJ CK CL CM CN         · Mobility - Walking and Moving Around:               - CURRENT STATUS:    CM - 80%-99% impaired, limited or restricted               - GOAL STATUS:           CL - 60%-79% impaired, limited or restricted               - D/C STATUS:                       ---------------To be determined---------------  [place] / Product Type: Medicare /       Medical Necessity:     · Patient demonstrates good rehab potential due to higher previous functional level. Reason for Services/Other Comments:  · Patient continues to require modification of therapeutic interventions to increase complexity of exercises. Use of outcome tool(s) and clinical judgement create a POC that gives a: Questionable prediction of patient's progress: MODERATE COMPLEXITY                 TREATMENT:      Pre-treatment Symptoms/Complaints: leg pain noted  Pain: Initial: 5/10     Post Session: 4/10      Therapeutic Activity: (   28 minutes): Therapeutic activities including Bed mobility, seated balance and positioning/scooting, sit to stands, and standing (x 3 bouts of 10-15 seconds) to improve mobility, strength, balance, coordination and activity tolerance. Required minimum assist and some  verbal/manual cues to promote static balance in standing and promote coordination of bilateral, lower extremity(s). Date:  3/27/17 Date:  3/30/17 Date:     Activity/Exercise Parameters Parameters Parameters   AAROM L heel slides 1x8     AAROM L hip abduction 1x8     Ankle pumps 1x10 1x10    Seated LAQ  1x10    Seated hip flexion  1x10                       Braces/Orthotics/Lines/Etc:   · room air  Treatment/Session Assessment:    · Response to Treatment: Pt able to stand this session, but was too unsafe to attempt to transfer to bedside chair.   · Interdisciplinary Collaboration:  · Physical Therapist, Registered Nurse and MD (2 physical therapists)  · After treatment position/precautions:  · Supine in bed, Bed/Chair-wheels locked, Bed in low position, Call light within reach and RN notified  · Compliance with Program/Exercises: Will assess as treatment progresses. · Recommendations/Intent for next treatment session: \"Next visit will focus on advancements to more challenging activities and reduction in assistance provided\".   Total Treatment Duration:  PT Patient Time In/Time Out  Time In: 0952  Time Out: 81 Jennifer Hopkins DPT

## 2017-04-02 NOTE — PROGRESS NOTES
Inpatient Hematology / Oncology Progress Note      Admission Date: 3/21/2017  7:10 PM  Reason for Admission/Hospital Course: DVT, lower extremity, proximal, acute, bilateral (HCC)      24 Hour Events:  Feeling a little better today  Working with PT this morning trying to get up with walker. HGB is stable to improved on anticoagulation. ROS:  Constitutional: Positive for fatigue and weakness; negative for fever, chills. CV: Negative for chest pain, palpitations, edema. Respiratory: Negative for dyspnea, cough, wheezing. GI: Negative for nausea, abdominal pain, diarrhea. 10 point review of systems is otherwise negative with the exception of the elements mentioned above in the HPI. Allergies   Allergen Reactions    Sulfite Hives       OBJECTIVE:  Patient Vitals for the past 8 hrs:   BP Temp Pulse Resp SpO2   17 0648 107/45 98.1 °F (36.7 °C) 76 18 94 %   17 0257 108/51 98.4 °F (36.9 °C) 78 18 94 %     Temp (24hrs), Av.5 °F (36.9 °C), Min:98.1 °F (36.7 °C), Max:98.7 °F (37.1 °C)         Physical Exam:  Constitutional: Well developed, well nourished male in no acute distress, lying comfortably in the hospital bed. HEENT: Normocephalic and atraumatic. Oropharynx is clear, mucous membranes are moist. Sclerae anicteric. Neck supple without JVD. No thyromegaly present. Skin Warm and dry. No rash noted. No erythema. No pallor. Bruising to right arm-interior. Hematoma and pain to Lt medial FA. Respiratory Lungs are clear to auscultation bilaterally without wheezes, rales or rhonchi, normal air exchange without accessory muscle use. CVS Normal rate, regular rhythm and normal S1 and S2. No murmurs, gallops, or rubs. Abdomen Soft, nontender and nondistended, normoactive bowel sounds. No palpable mass. Neuro Grossly nonfocal with no obvious sensory or motor deficits. MSK Normal range of motion in general.  BLE edema with tenderness.    Psych Appropriate mood and affect.         Labs:      Recent Labs      04/02/17   0555  04/01/17   0708  03/31/17   0745   WBC  7.9  7.5  10.0   RBC  2.94*  2.88*  2.93*   HGB  8.6*  8.4*  8.6*   HCT  28.4*  27.4*  27.7*   MCV  96.6  95.1  94.5   MCH  29.3  29.2  29.4   MCHC  30.3*  30.7*  31.0*   RDW  17.8*  17.5*  17.1*   PLT  381  298  322   GRANS  67  70  77   LYMPH  18  16  13   MONOS  8  9  8   EOS  4  3  1   BASOS  1  1  0   IG  2.2  1.5  0.9   DF  AUTOMATED  AUTOMATED  AUTOMATED   ANEU  5.2  5.2  7.6   ABL  1.5  1.2  1.3   ABM  0.7  0.7  0.8   ANNIE  0.3  0.3  0.1   ABB  0.1  0.0  0.0   AIG  0.2  0.1  0.1        Recent Labs      04/02/17   0555  04/01/17   0708  03/31/17   0745   NA  143  140  139   K  4.0  3.9  3.9   CL  107  104  104   CO2  30  28  26   AGAP  6*  8  9   GLU  96  109*  114*   BUN  19  18  17   CREA  1.03  0.88  0.91   GFRAA  >60  >60  >60   GFRNA  >60  >60  >60   CA  8.2*  8.3  8.3   SGOT  40*  25  26   AP  121  108  109   TP  5.8*  5.5*  5.7*   ALB  1.8*  1.9*  2.0*   GLOB  4.0*  3.6*  3.7*   AGRAT  0.5*  0.5*  0.5*         Imaging:      ASSESSMENT:    Problem List  Date Reviewed: 3/21/2017          Codes Class Noted    * (Principal)DVT, lower extremity, proximal, acute ([facility] ICD-10-CM: I82.4Y9  ICD-9-CM: 453.41  3/21/2017        Benign essential HTN ICD-10-CM: I10  ICD-9-CM: 401.1  9/13/2016        Type 2 diabetes mellitus without complication ([facility] JDW-53-MS: E11.9  ICD-9-CM: 250.00  7/22/2016        Benign non-nodular prostatic hyperplasia without lower urinary tract symptoms ICD-10-CM: N40.0  ICD-9-CM: 600.90  7/22/2016        Hypertriglyceridemia ICD-10-CM: E78.1  ICD-9-CM: 272.1  7/22/2016        Corporo-venous occlusive erectile dysfunction ICD-10-CM: N52.02  ICD-9-CM: 607.84  7/22/2016        Depression ICD-10-CM: F32.9  ICD-9-CM: 311  7/22/2016        Obstructive sleep apnea syndrome ICD-10-CM: G47.33  ICD-9-CM: 327.23  7/22/2016    Overview Signed 3/21/2017 10:40 PM by BIJAN Arndt     Home CPAP             Morbid obesity due to excess calories [facility]) ICD-10-CM: E66.01  ICD-9-CM: 278.01  7/22/2016        Primary insomnia ICD-10-CM: F51.01  ICD-9-CM: 307.42  7/22/2016                PLAN:    -Acute Anemia  Check Haptoglobin, Iron studies, Vitamin B12, Folate, Retic Count, Guiac-stool  We also ordered a xray of his left FA due to his pain that he states continues since his fall, denies previous imaging to that area. 3/29: will obtain abdominal CT to assess for any bleeding for reason of anemia, B12 and folate are normal, retic count is elevated at 6.2, which we find to be a positive sign of increased production, Haptoglobin is normal at 128, Ferdinand Rainer has yet to be obtained  3/30: No bleeding on CT, pt now has hematoma and persistent pain to Lt medial FA, will obtain MRI to determine if source of bleed, Hgb is stable at 8.4 today, INR 2.5 will restart Coumadin. There has been no change in edema and pain to his LEs, will repeat dopplers. 4/1/17 His counts are stable on current CBC while on warfarin. He has what appears to be a resolving hematoma in his left arm, perhaps in part associated with his prior drop in HCT. Not surprisingly he has some phlebitic pain in his right leg. Will adjust meds. He might also benefit from low dose ASA for phlebitic process. 4/2/17 HGB stable, Arm hematoma resolving. Still with leg pain but agreeable to PT assessment. He should have follow-up with me in about 3-4 post discharge.                MD Antony [facility]  [address] [address]  Office : [phone]  Fax : [phone]

## 2017-04-02 NOTE — PROGRESS NOTES
Hospitalist Progress Note    Patient: Loan Rivera MRN: [id number]  SSN: [id number]    YOB: 1945  Age: 66 y.o. Sex: male      Admit Date: 3/21/2017    LOS: 12 days     Subjective:     66year old CM with a PMH of HTN, depression, DM2, ELIDIA, and questionable history of DVT/PE(?) since he has an IVC filter was recently at [facility] for compression fracture after a mechanical fall and was sent to rehab. While at rehab the patient's legs started swell so they sent him to see his PCP who did a doppler and discovered bilateral DVTs up to the iliac veins. He was admitted and received direct TPA by IR and has been on a Heparin bridge with Coumadin. His Hgb continued to drop down to 6.3 before getting 1U PRBC and hematology was consulted. 3/31 - The patient states taht his bladder/urethral spasms are worse today. Left forearm more tender. He states that he wants to get to the chair today. No F/C/N/V. No CP/SOB. 4/1 - No new acute complaints. He continues to refuse to try to slide to chair or participate in getting up. He will try to sit to side of bed but says it hurts too much. 4/2 - No new acute complaints. He states that he feels better. He wants to try to get to the chair today. Left arm swelling improved. Review of systems negative except stated above. Objective:     Vitals:    04/01/17 2024 04/01/17 2310 04/02/17 0257 04/02/17 0648   BP: 128/51 131/57 108/51 107/45   Pulse: 85 82 78 76   Resp: 18 18 18 18   Temp: 98.7 °F (37.1 °C) 98.6 °F (37 °C) 98.4 °F (36.9 °C) 98.1 °F (36.7 °C)   SpO2: 92% 95% 94% 94%   Weight:       Height:            Intake and Output: Not Accurate    Physical Exam:   GENERAL: alert, cooperative, no distress, appears stated age  EYE: conjunctivae/corneas clear. PERRL. THROAT & NECK: normal and no erythema or exudates noted. LUNG: clear to auscultation bilaterally  HEART: regular rate and rhythm, S1S2, no murmur, no JVD  ABDOMEN: soft, non-tender, non-distended. Bowel sounds normal.   EXTREMITIES:  3+ bilateral LE edema, LUE swollen and tender  SKIN: Ecchymoses on RUE  NEUROLOGIC: A&Ox3. Cranial nerves 2-12 grossly intact. Lab/Data Review:  BMP:   Lab Results   Component Value Date/Time     04/02/2017 05:55 AM    K 4.0 04/02/2017 05:55 AM     04/02/2017 05:55 AM    CO2 30 04/02/2017 05:55 AM    AGAP 6 (L) 04/02/2017 05:55 AM    GLU 96 04/02/2017 05:55 AM    BUN 19 04/02/2017 05:55 AM    CREA 1.03 04/02/2017 05:55 AM    GFRAA >60 04/02/2017 05:55 AM    GFRNA >60 04/02/2017 05:55 AM     CBC:   Lab Results   Component Value Date/Time    WBC 7.9 04/02/2017 05:55 AM    HGB 8.6 (L) 04/02/2017 05:55 AM    HCT 28.4 (L) 04/02/2017 05:55 AM     04/02/2017 05:55 AM     COAGS:   Lab Results   Component Value Date/Time    PTP 31.6 (H) 04/02/2017 05:55 AM    INR 2.9 (H) 04/02/2017 05:55 AM        Assessment:     Principal Problem:    DVT, lower extremity, proximal, acute ([address] (3/21/2017)    Active Problems:    Type 2 diabetes mellitus without complication ([address] (6/74/0777)      Benign non-nodular prostatic hyperplasia without lower urinary tract symptoms (7/22/2016)      Hypertriglyceridemia (7/22/2016)      Depression (7/22/2016)      Obstructive sleep apnea syndrome (7/22/2016)      Overview: Home CPAP      Morbid obesity due to excess calories ([address] (7/22/2016)      Primary insomnia (7/22/2016)      Benign essential HTN (9/13/2016)        Plan:     - Will stop checking Hgb daily. Currently stable. - Continue Ceftriaxone for E.  Coli UTI - Cipro has too many interactions with his meds  - Coumadin restarted - per pharmacy  - No LUE DVT - getting LUE U/S to look for source of bleeding into arm  - Oxycontin 10mg Q12H scheduled, Roxicodone Q6H  - Will need to work with PT - needs to be aggressive in getting him up - no limitations  - Appreciate hematology's input and assitance - will need hypercoagulation w/o as OP?  - Appreciate PM&Rs input and assistance  - SW to assist with discharge planning    Signed By: Dao Hunt DO     April 2, 2017

## 2017-04-02 NOTE — PROGRESS NOTES
Warfarin dosing per pharmacist    Jennifer Peraza is a 66 y.o. male. Height: 5' 6\" (167.6 cm)    Weight: 126.2 kg (278 lb 4.8 oz)    Indication:  Bilateral LE DVT, thrombosed IVC    Goal INR:  2-3    Home dose:  New start    Risk factors or significant drug interactions: Other anticoagulants:  Heparin bridge - Stopped 3/29/17    Daily Monitoring  Date  INR     Warfarin dose HGB              Notes  3/24  1.8  5 mg  9.4  3/25  1.1  5 mg   ---  3/26  1.2  5 mg  7.1  3/27  1.7  4 mg  ---  3/28  2.0  4 mg  9.1  3/29  2.2  4 mg  ---  3/30  2.5  4 mg  8.4  3/31  2.7  4 mg  8.6  4/1  2.9  3 mg  8.4  4/2  2.9  3 mg  8.6    Pharmacy consulted to dose coumadin on 3/24. Pt s/p EKOS and started on coumadin bridged with heparin. Pharmacy re-consulted on 3/30 after warfarin was briefly stopped as patient worked up for possible bleed. No doses missed though. INR 2.9 again today after decrease to 3 mg last night. Continue 3 mg. Following daily INR    Thank you,  Mk Hall, Pharm. D.   Clinical Pharmacist  [phone]

## 2017-04-03 LAB
ERYTHROCYTE [DISTWIDTH] IN BLOOD BY AUTOMATED COUNT: 17.7 % (ref 11.9–14.6)
GLUCOSE BLD STRIP.AUTO-MCNC: 100 MG/DL (ref 65–100)
GLUCOSE BLD STRIP.AUTO-MCNC: 102 MG/DL (ref 65–100)
GLUCOSE BLD STRIP.AUTO-MCNC: 108 MG/DL (ref 65–100)
GLUCOSE BLD STRIP.AUTO-MCNC: 110 MG/DL (ref 65–100)
HCT VFR BLD AUTO: 28.8 % (ref 41.1–50.3)
HGB BLD-MCNC: 8.8 G/DL (ref 13.6–17.2)
INR PPP: 2.6 (ref 0.9–1.2)
MCH RBC QN AUTO: 29.7 PG (ref 26.1–32.9)
MCHC RBC AUTO-ENTMCNC: 30.6 G/DL (ref 31.4–35)
MCV RBC AUTO: 97.3 FL (ref 79.6–97.8)
PLATELET # BLD AUTO: 349 K/UL (ref 150–450)
PMV BLD AUTO: 9.1 FL (ref 10.8–14.1)
PROTHROMBIN TIME: 29 SEC (ref 9.6–12)
RBC # BLD AUTO: 2.96 M/UL (ref 4.23–5.67)
WBC # BLD AUTO: 6.9 K/UL (ref 4.3–11.1)

## 2017-04-03 PROCEDURE — 85610 PROTHROMBIN TIME: CPT | Performed by: INTERNAL MEDICINE

## 2017-04-03 PROCEDURE — 74011250637 HC RX REV CODE- 250/637: Performed by: INTERNAL MEDICINE

## 2017-04-03 PROCEDURE — 36415 COLL VENOUS BLD VENIPUNCTURE: CPT | Performed by: INTERNAL MEDICINE

## 2017-04-03 PROCEDURE — 74011250637 HC RX REV CODE- 250/637: Performed by: FAMILY MEDICINE

## 2017-04-03 PROCEDURE — 97110 THERAPEUTIC EXERCISES: CPT

## 2017-04-03 PROCEDURE — 65270000029 HC RM PRIVATE

## 2017-04-03 PROCEDURE — 85027 COMPLETE CBC AUTOMATED: CPT | Performed by: INTERNAL MEDICINE

## 2017-04-03 PROCEDURE — 82962 GLUCOSE BLOOD TEST: CPT

## 2017-04-03 PROCEDURE — 97530 THERAPEUTIC ACTIVITIES: CPT

## 2017-04-03 PROCEDURE — 99231 SBSQ HOSP IP/OBS SF/LOW 25: CPT | Performed by: PHYSICAL MEDICINE & REHABILITATION

## 2017-04-03 PROCEDURE — 74011250637 HC RX REV CODE- 250/637: Performed by: PHYSICAL MEDICINE & REHABILITATION

## 2017-04-03 RX ORDER — OXYCODONE HYDROCHLORIDE 5 MG/1
10 TABLET ORAL EVERY 6 HOURS
Status: DISCONTINUED | OUTPATIENT
Start: 2017-04-03 | End: 2017-04-04 | Stop reason: HOSPADM

## 2017-04-03 RX ORDER — WARFARIN 2 MG/1
4 TABLET ORAL
Status: DISCONTINUED | OUTPATIENT
Start: 2017-04-03 | End: 2017-04-04 | Stop reason: HOSPADM

## 2017-04-03 RX ORDER — WARFARIN 2 MG/1
2 TABLET ORAL
Status: DISCONTINUED | OUTPATIENT
Start: 2017-04-09 | End: 2017-04-04 | Stop reason: HOSPADM

## 2017-04-03 RX ORDER — NITROFURANTOIN MACROCRYSTALS 50 MG/1
100 CAPSULE ORAL EVERY 6 HOURS
Status: DISCONTINUED | OUTPATIENT
Start: 2017-04-03 | End: 2017-04-04 | Stop reason: HOSPADM

## 2017-04-03 RX ADMIN — Medication 10 ML: at 21:23

## 2017-04-03 RX ADMIN — GABAPENTIN 100 MG: 100 CAPSULE ORAL at 21:22

## 2017-04-03 RX ADMIN — NITROFURANTOIN MACROCRYSTALS 100 MG: 50 CAPSULE ORAL at 17:22

## 2017-04-03 RX ADMIN — OXYCODONE HYDROCHLORIDE 10 MG: 5 TABLET ORAL at 23:13

## 2017-04-03 RX ADMIN — Medication 10 ML: at 05:57

## 2017-04-03 RX ADMIN — FAMOTIDINE 20 MG: 20 TABLET, FILM COATED ORAL at 08:44

## 2017-04-03 RX ADMIN — OXYCODONE HYDROCHLORIDE 10 MG: 10 TABLET, FILM COATED, EXTENDED RELEASE ORAL at 08:44

## 2017-04-03 RX ADMIN — DIPHENHYDRAMINE HYDROCHLORIDE 25 MG: 25 CAPSULE ORAL at 04:00

## 2017-04-03 RX ADMIN — TAMSULOSIN HYDROCHLORIDE 0.4 MG: 0.4 CAPSULE ORAL at 08:44

## 2017-04-03 RX ADMIN — FAMOTIDINE 20 MG: 20 TABLET, FILM COATED ORAL at 17:22

## 2017-04-03 RX ADMIN — RDII 250 MG CAPSULE 250 MG: at 17:22

## 2017-04-03 RX ADMIN — RDII 250 MG CAPSULE 250 MG: at 08:44

## 2017-04-03 RX ADMIN — OXYCODONE HYDROCHLORIDE 10 MG: 10 TABLET, FILM COATED, EXTENDED RELEASE ORAL at 21:22

## 2017-04-03 RX ADMIN — Medication 10 ML: at 13:30

## 2017-04-03 RX ADMIN — OXYCODONE HYDROCHLORIDE 10 MG: 5 TABLET ORAL at 11:28

## 2017-04-03 RX ADMIN — OXYCODONE HYDROCHLORIDE 10 MG: 5 TABLET ORAL at 17:23

## 2017-04-03 RX ADMIN — OLMESARTAN MEDOXOMIL 40 MG: 40 TABLET, FILM COATED ORAL at 08:44

## 2017-04-03 RX ADMIN — CITALOPRAM HYDROBROMIDE 20 MG: 20 TABLET ORAL at 08:44

## 2017-04-03 RX ADMIN — NITROFURANTOIN MACROCRYSTALS 100 MG: 50 CAPSULE ORAL at 13:29

## 2017-04-03 RX ADMIN — NITROFURANTOIN MACROCRYSTALS 100 MG: 50 CAPSULE ORAL at 23:13

## 2017-04-03 RX ADMIN — WARFARIN SODIUM 4 MG: 2 TABLET ORAL at 17:25

## 2017-04-03 NOTE — PROGRESS NOTES
SW spoke w spouse today who wanted to know if [facility] has accepted. SW advised that no decision made yet and agreed to notify spouse once decision is made if pt can go to [facility] at discharge. [facility] declined admission. Referrals being sent to [address] and [facility].

## 2017-04-03 NOTE — PROGRESS NOTES
Pt resting quietly in bed. Rates pain 5/10 at this time. Alert and oriented x4. Lungs sounds clear but diminished bilaterally with respirations even and unlabored. Bowel sounds active in all quadrants. +2 pulses bilaterally in upper and lower extremities. 3+ pitting edema BLE. LILLY IV capped with no signs of phlebitis. Instructed to call for assistance. Call light in reach. Voiced understanding and identified call light.

## 2017-04-03 NOTE — PROGRESS NOTES
Inpatient Hematology / Oncology Progress Note      Admission Date: 3/21/2017  7:10 PM  Reason for Admission/Hospital Course: DVT, lower extremity, proximal, acute, bilateral (HCC)      24 Hour Events:  Feeling a little better today  HGB is stable to improved   On coumadin  Left upper arm hematoma improving  Complaint of right lower leg pain    ROS:  Constitutional: Positive for fatigue and weakness; negative for fever, chills. CV: Negative for chest pain, palpitations, edema. Respiratory: Negative for dyspnea, cough, wheezing. GI: Negative for nausea, abdominal pain, diarrhea. 10 point review of systems is otherwise negative with the exception of the elements mentioned above in the HPI. Allergies   Allergen Reactions    Sulfite Hives       OBJECTIVE:  Patient Vitals for the past 8 hrs:   BP Temp Pulse Resp SpO2   17 1118 115/78 98.3 °F (36.8 °C) 79 19 98 %   17 0720 101/59 97.9 °F (36.6 °C) 83 19 95 %     Temp (24hrs), Av.3 °F (36.8 °C), Min:97.7 °F (36.5 °C), Max:98.7 °F (37.1 °C)     07 -  1900  In: 360 [P.O.:360]  Out: -     Physical Exam:  Constitutional: Well developed, well nourished male in no acute distress, lying comfortably in the hospital bed. HEENT: Normocephalic and atraumatic. Oropharynx is clear, mucous membranes are moist. Sclerae anicteric. Skin Warm and dry. No rash noted. No erythema. No pallor. Bruising to right arm-interior. Respiratory Lungs are clear to auscultation bilaterally without wheezes, rales or rhonchi, normal air exchange without accessory muscle use. CVS Normal rate, regular rhythm and normal S1 and S2. No murmurs, gallops, or rubs. Abdomen Soft, nontender and nondistended, normoactive bowel sounds. No palpable mass. Neuro Grossly nonfocal with no obvious sensory or motor deficits. MSK Normal range of motion in general.  BLE edema with tenderness. Psych Appropriate mood and affect.         Labs:      Recent Labs      04/03/17   0647  04/02/17   0555  04/01/17   0708   WBC  6.9  7.9  7.5   RBC  2.96*  2.94*  2.88*   HGB  8.8*  8.6*  8.4*   HCT  28.8*  28.4*  27.4*   MCV  97.3  96.6  95.1   MCH  29.7  29.3  29.2   MCHC  30.6*  30.3*  30.7*   RDW  17.7*  17.8*  17.5*   PLT  349  381  298   GRANS   --   67  70   LYMPH   --   18  16   MONOS   --   8  9   EOS   --   4  3   BASOS   --   1  1   IG   --   2.2  1.5   DF   --   AUTOMATED  AUTOMATED   ANEU   --   5.2  5.2   ABL   --   1.5  1.2   ABM   --   0.7  0.7   ANNIE   --   0.3  0.3   ABB   --   0.1  0.0   AIG   --   0.2  0.1        Recent Labs      04/02/17   0555  04/01/17   0708   NA  143  140   K  4.0  3.9   CL  107  104   CO2  30  28   AGAP  6*  8   GLU  96  109*   BUN  19  18   CREA  1.03  0.88   GFRAA  >60  >60   GFRNA  >60  >60   CA  8.2*  8.3   SGOT  40*  25   AP  121  108   TP  5.8*  5.5*   ALB  1.8*  1.9*   GLOB  4.0*  3.6*   AGRAT  0.5*  0.5*         Imaging:      ASSESSMENT:    Problem List  Date Reviewed: 3/21/2017          Codes Class Noted    * (Principal)DVT, lower extremity, proximal, acute ([facility] ICD-10-CM: I82.4Y9  ICD-9-CM: 453.41  3/21/2017        Benign essential HTN ICD-10-CM: I10  ICD-9-CM: 401.1  9/13/2016        Type 2 diabetes mellitus without complication ([facility] CLL-48-YY: E11.9  ICD-9-CM: 250.00  7/22/2016        Benign non-nodular prostatic hyperplasia without lower urinary tract symptoms ICD-10-CM: N40.0  ICD-9-CM: 600.90  7/22/2016        Hypertriglyceridemia ICD-10-CM: E78.1  ICD-9-CM: 272.1  7/22/2016        Corporo-venous occlusive erectile dysfunction ICD-10-CM: N52.02  ICD-9-CM: 607.84  7/22/2016        Depression ICD-10-CM: F32.9  ICD-9-CM: 311  7/22/2016        Obstructive sleep apnea syndrome ICD-10-CM: G47.33  ICD-9-CM: 327.23  7/22/2016    Overview Signed 3/21/2017 10:40 PM by BIJAN Chaney     Home CPAP             Morbid obesity due to excess calories ([facility] ICD-10-CM: E66.01  ICD-9-CM: 278.01  7/22/2016        Primary insomnia ICD-10-CM: F51.01  ICD-9-CM: 307.42  7/22/2016                PLAN:    -Acute Anemia  Check Haptoglobin, Iron studies, Vitamin B12, Folate, Retic Count, Guiac-stool  We also ordered a xray of his left FA due to his pain that he states continues since his fall, denies previous imaging to that area. 3/29: will obtain abdominal CT to assess for any bleeding for reason of anemia, B12 and folate are normal, retic count is elevated at 6.2, which we find to be a positive sign of increased production, Haptoglobin is normal at 128, Pop Morel has yet to be obtained  3/30: No bleeding on CT, pt now has hematoma and persistent pain to Lt medial FA, will obtain MRI to determine if source of bleed, Hgb is stable at 8.4 today, INR 2.5 will restart Coumadin. There has been no change in edema and pain to his LEs, will repeat dopplers. 4/1/17 His counts are stable on current CBC while on warfarin. He has what appears to be a resolving hematoma in his left arm, perhaps in part associated with his prior drop in HCT. Not surprisingly he has some phlebitic pain in his right leg. Will adjust meds. He might also benefit from low dose ASA for phlebitic process. 4/2/17 HGB stable, Arm hematoma resolving. Still with leg pain but agreeable to PT assessment. 4/3/17 HGB stable. Now on coumadin. His PCP provider is Dr. Conchita Verdin who maintains and prescribes his other medication so most likely he will want to go for his coumadin checks there however, if he prefers he can come to [facility] for coumadin maintenance. He will need follow up with Dr. Christina Mcallister 3-4 weeks after discharge. DEX [facility] Hematology Oncology  [address] [facility] [address]  Office : [phone]  Fax : [phone]       Attending Addendum:  I personally evaluated the patient with Willow Pastor NELYANE,  and agree with the assessment, findings and plan as documented.   Appears stable, heart regular without murmur, lungs clear, abdomen benign. Tolerating anticoagulation, hgb stable. F/u w hematology in 3-4 weeks.                Abbey Dudley MD  [address]  Office : [phone]  Fax : [phone]

## 2017-04-03 NOTE — PROGRESS NOTES
Patient scratching at backside. Multiple scratch marks to hip and low back. Notified Dr Marlin Arora. New orders received    Held scheduled narcotics related to some new confusion and patient stated that he was having no pain at due times. Patient also feels he is having periods of confusion and wants to back off on pain meds.  Would like to take them as needed instead of scheduled  BP 95/44 this AM

## 2017-04-03 NOTE — PROGRESS NOTES
Spoke with pt's wife Shekhar Lewis via telephone who requested to speak with physician. Paged Dr Jyotsna Harris and relayed message that pt's wife was requesting a phone call.

## 2017-04-03 NOTE — PROGRESS NOTES
Hospitalist Progress Note    Patient: Collette Marcos MRN: [id number]  SSN: [id number]    YOB: 1945  Age: 66 y.o. Sex: male      Admit Date: 3/21/2017    LOS: 13 days     Subjective:     66year old CM with a PMH of HTN, depression, DM2, ELIDIA, and questionable history of DVT/PE(?) since he has an IVC filter was recently at [facility] for compression fracture after a mechanical fall and was sent to rehab. While at rehab the patient's legs started swell so they sent him to see his PCP who did a doppler and discovered bilateral DVTs up to the iliac veins. He was admitted and received direct TPA by IR and has been on a Heparin bridge with Coumadin. His Hgb continued to drop down to 6.3 before getting 1U PRBC and hematology was consulted. 3/31 - The patient states taht his bladder/urethral spasms are worse today. Left forearm more tender. He states that he wants to get to the chair today. No F/C/N/V. No CP/SOB. 4/1 - No new acute complaints. He continues to refuse to try to slide to chair or participate in getting up. He will try to sit to side of bed but says it hurts too much. 4/2 - No new acute complaints. He states that he feels better. He wants to try to get to the chair today. Left arm swelling improved. 4/3 - He had some confusion yesterday afternoon, which has since resolved. He continues to have pain in LE, L > R but is eager to work with PT to get to the chair today. Review of systems negative except stated above. Objective:     Vitals:    04/02/17 1959 04/03/17 0101 04/03/17 0326 04/03/17 0720   BP: 125/73 112/44 95/44 101/59   Pulse: 77 76 77 83   Resp: 17 17 18 19   Temp: 98.7 °F (37.1 °C) 98.6 °F (37 °C) 97.7 °F (36.5 °C) 97.9 °F (36.6 °C)   SpO2: 93% 93% 94% 95%   Weight:       Height:            Intake and Output: Not Accurate    Physical Exam:   GENERAL: alert, cooperative, no distress, appears stated age  EYE: conjunctivae/corneas clear. PERRL.   THROAT & NECK: normal and no erythema or exudates noted. LUNG: clear to auscultation bilaterally  HEART: regular rate and rhythm, S1S2, no murmur, no JVD  ABDOMEN: soft, non-tender, non-distended. Bowel sounds normal.   EXTREMITIES:  3+ bilateral LE edema, LUE swollen and tender  SKIN: Ecchymoses on RUE  NEUROLOGIC: A&Ox3. Cranial nerves 2-12 grossly intact. Lab/Data Review:  BMP:   No results found for: NA, K, CL, CO2, AGAP, GLU, BUN, CREA, GFRAA, GFRNA  CBC:   Lab Results   Component Value Date/Time    WBC 6.9 04/03/2017 06:47 AM    HGB 8.8 (L) 04/03/2017 06:47 AM    HCT 28.8 (L) 04/03/2017 06:47 AM     04/03/2017 06:47 AM     COAGS:   Lab Results   Component Value Date/Time    PTP 29.0 (H) 04/03/2017 06:47 AM    INR 2.6 (H) 04/03/2017 06:47 AM        Assessment:     Principal Problem:    DVT, lower extremity, proximal, acute ([address] (3/21/2017)    Active Problems:    Type 2 diabetes mellitus without complication ([address] (7/89/2292)      Benign non-nodular prostatic hyperplasia without lower urinary tract symptoms (7/22/2016)      Hypertriglyceridemia (7/22/2016)      Depression (7/22/2016)      Obstructive sleep apnea syndrome (7/22/2016)      Overview: Home CPAP      Morbid obesity due to excess calories ([address] (7/22/2016)      Primary insomnia (7/22/2016)      Benign essential HTN (9/13/2016)        Plan:     - Will stop checking Hgb daily. Currently stable. - Continue Ceftriaxone for E.  Coli UTI - Cipro has too many interactions with his meds - will ask pharmacy for assistance in switching to PO due to interactions  - Coumadin restarted - per pharmacy  - No LUE DVT - swelling improving  - Oxycontin 10mg Q12H scheduled, Roxicodone Q6H - dose decreased due to confusion  - Will need to work with PT - needs to be aggressive in getting him up - no limitations  - Appreciate hematology's input and assitance - will need hypercoagulation w/o as OP?  - Appreciate PM&Rs input and assistance  - SW to assist with discharge planning    Signed By: Shahid Greene, DO     April 3, 2017

## 2017-04-03 NOTE — PROGRESS NOTES
Problem: Mobility Impaired (Adult and Pediatric)  Goal: *Acute Goals and Plan of Care (Insert Text)  STG:  (1.)Mr. Jeane Hough will move from supine to sit and sit to supine , scoot up and down and roll side to side with MINIMAL ASSIST within 5 day(s). (2.)Mr. Jeane Hough will transfer from bed to chair and chair to bed with MODERATE ASSIST using the least restrictive device within 5 day(s). (3.)Mr. Jeane Hough will ambulate with MODERATE ASSIST for 5 feet with the least restrictive device within 5 day(s). (4.)Mr. Jeane Hough will tolerate 25 minutes of therapeutic activity/exercise within 5 days in order to Improve activity tolerance for mobility. (5.)Mr. Jeane Hough will perform LE exercises with 1 to 2 cues for form within 3 days to improve strength for functional transfers and ambulation. LTG:  (1.)Mr. Jeane Hough will move from supine to sit and sit to supine , scoot up and down and roll side to side in bed with CONTACT GUARD ASSIST within 10 day(s). (2.)Mr. Jaene Hough will transfer from bed to chair and chair to bed with MINIMAL ASSIST using the least restrictive device within 10 day(s). (3.)Mr. Jeane Hough will ambulate with MINIMAL ASSIST for 25 feet with the least restrictive device within 10 day(s). Goals to be updated as patient progresses. ________________________________________________________________________________________________      PHYSICAL THERAPY: Daily Note, Treatment Day: 6th and AM 4/3/2017  INPATIENT: Hospital Day: 14  [place] / Product Type: Medicare /      NAME/AGE/GENDER: David Patrick is a 66 y.o. male            PRIMARY DIAGNOSIS: DVT, lower extremity, proximal, acute, bilateral ([facility]) DVT, lower extremity, proximal, acute ([address] DVT, lower extremity, proximal, acute ([facility]        ICD-10: Treatment Diagnosis:       · Difficulty in walking, Not elsewhere classified (R26.2)   Precaution/Allergies:  Sulfite       ASSESSMENT:      Mr. Jeane Hough is a 66 y.o. male admitted with extensive DVTs of the LEs. Pt today has continued complaints of LE pain (L>R) . He required min-modA x 1 to sit up at the edge of the bed with max cueing for sequencing and hand placement. Able to scoot forward with mod for better position prior to standing. Attempted to stand with assist of 1 but patient was unable. He then required maxA x 2 to perform sit to stand and maintain standing for 10-15 seconds at RW, but balance poor. Did not attempt transfer to bedside chair due to decreased safety with increased risk of falling. Performed exercises in sitting. Then worked on scooting laterally along edge of bed. He returned to supine position at end of session with Juanito x 1. Pt very motivated to improve, but limited by discomfort with mobility and edema. He demonstrated improvement today in only requiring assist of 1 for bed mobility. He is very interested in [facility], does not want to return to SNF for rehab. Will continue POC and PT efforts. This section established at most recent assessment   PROBLEM LIST (Impairments causing functional limitations):  1. Decreased Strength  2. Decreased ADL/Functional Activities  3. Decreased Transfer Abilities  4. Decreased Ambulation Ability/Technique  5. Decreased Balance  6. Increased Pain  7. Decreased Knowledge of Precautions  8. Decreased [place] with Home Exercise Program    INTERVENTIONS PLANNED: (Benefits and precautions of physical therapy have been discussed with the patient.)  1. Balance Exercise  2. Bed Mobility  3. Family Education  4. Gait Training  5. Home Exercise Program (HEP)  6. Therapeutic Activites  7. Therapeutic Exercise/Strengthening  8. Transfer Training  9. Patient Education  10.  Group Therapy      TREATMENT PLAN: Frequency/Duration: 3 times a week for duration of hospital stay  Rehabilitation Potential For Stated Goals: Madeline Mendez REHABILITATION/EQUIPMENT: (at time of discharge pending progress): Continue Skilled Therapy and Rehab. HISTORY:   History of Present Injury/Illness (Reason for Referral):  Per MD Note: \"Eliud Tidwell is a 66 y.o. male that presented to the ED with 2 week history of worsening swelling of the bilateral lower extremities with increasing difficulty with ambulation. He had OP US today showing extensive DVT. He is currently undergoing rehab at [facility] following admission at [facility] for JIM requiring 2 runs of HD. He has history of DVT in 2013 and had IVC filter placed at that time. Patient denies dyspnea or chest pain. The hospitalist have been asked to admit. \"  Past Medical History/Comorbidities:   Mr. Klaus Bob  has a past medical history of Calculus of kidney; Diabetes ([address] and DVT (deep venous thrombosis) ([facility] (2013). Mr. Klaus Bob  has a past surgical history that includes urological.  Social History/Living Environment:   Home Environment: Skilled nursing facility  One/Two Story Residence: One story  Living Alone: No  Support Systems: Spouse/Significant Other/Partner  Patient Expects to be Discharged to[de-identified] Private residence  Current DME Used/Available at Home: None  Tub or Shower Type: Tub/Shower combination  Prior Level of Function/Work/Activity:  Patient ambulatory prior to initial hospitalization. Ambulating in rehab. Number of Personal Factors/Comorbidities that affect the Plan of Care: 1-2: MODERATE COMPLEXITY   EXAMINATION:   Most Recent Physical Functioning:   Gross Assessment:                  Posture:     Balance:  Sitting: Impaired  Sitting - Static: Good (unsupported)  Sitting - Dynamic: Prop sitting  Standing: Impaired  Standing - Static: Constant support  Standing - Dynamic : Poor Bed Mobility:  Rolling: Minimum assistance  Supine to Sit: Minimum assistance; Moderate assistance  Sit to Supine: Minimum assistance  Scooting:  Moderate assistance  Duration: 25 Minutes  Wheelchair Mobility:     Transfers:  Sit to Stand: Maximum assistance;Assist x2  Stand to Sit: Moderate assistance;Assist x2  Gait:             Body Structures Involved:  1. Heart  2. Lungs  3. Muscles Body Functions Affected:  1. Sensory/Pain  2. Hematological  3. Neuromusculoskeletal  4. Movement Related Activities and Participation Affected:  1. Mobility  2. Self Care  3. Domestic Life  4. Interpersonal Interactions and Relationships  5. Community, Social and Esmeralda Davis   Number of elements that affect the Plan of Care: 4+: HIGH COMPLEXITY   CLINICAL PRESENTATION:   Presentation: Evolving clinical presentation with changing clinical characteristics: MODERATE COMPLEXITY   CLINICAL DECISION MAKIN [facility] Mobility Inpatient Short Form  How much difficulty does the patient currently have. .. Unable A Lot A Little None   1. Turning over in bed (including adjusting bedclothes, sheets and blankets)? [ ] 1   [X] 2   [ ] 3   [ ] 4   2. Sitting down on and standing up from a chair with arms ( e.g., wheelchair, bedside commode, etc.)   [X] 1   [ ] 2   [ ] 3   [ ] 4   3. Moving from lying on back to sitting on the side of the bed? [ ] 1   [X] 2   [ ] 3   [ ] 4   How much help from another person does the patient currently need. .. Total A Lot A Little None   4. Moving to and from a bed to a chair (including a wheelchair)? [X] 1   [ ] 2   [ ] 3   [ ] 4   5. Need to walk in hospital room? [X] 1   [ ] 2   [ ] 3   [ ] 4   6. Climbing 3-5 steps with a railing? [X] 1   [ ] 2   [ ] 3   [ ] 4   © , Trustees of [address] [place] [id number], under license to [employer]. All rights reserved    Score:  Initial: 8 Most Recent: X (Date: -- )     Interpretation of Tool:  Represents activities that are increasingly more difficult (i.e. Bed mobility, Transfers, Gait).        Score 24 23 22-20 19-15 14-10 9-7 6       Modifier CH CI CJ CK CL CM CN         · Mobility - Walking and Moving Around:               - CURRENT STATUS:    CM - 80%-99% impaired, limited or restricted               - GOAL STATUS:           CL - 60%-79% impaired, limited or restricted               - D/C STATUS:                       ---------------To be determined---------------  [place] / Product Type: Medicare /       Medical Necessity:     · Patient demonstrates good rehab potential due to higher previous functional level. Reason for Services/Other Comments:  · Patient continues to require modification of therapeutic interventions to increase complexity of exercises. Use of outcome tool(s) and clinical judgement create a POC that gives a: Questionable prediction of patient's progress: MODERATE COMPLEXITY                 TREATMENT:      Pre-treatment Symptoms/Complaints: leg pain noted  Pain: Initial: 8/10     Post Session: 8/10      Therapeutic Activity: (25 Minutes  ):  Therapeutic activities including Bed mobility, seated balance and positioning/scooting, sit to stands, and standing with RW to improve mobility, strength, balance, coordination and activity tolerance. Required max verbal/manual cues to promote static balance in standing and promote coordination of bilateral, lower extremity(s) and for correct hand and foot placement. Therapeutic Exercise: (  15 minutes):  Exercises per grid below to improve mobility, strength, balance and coordination. Required minimal visual and verbal cues to promote proper body alignment. Progressed complexity of movement as indicated.        Date:  3/27/17 Date:  3/30/17 Date:  4/03/17   Activity/Exercise Parameters Parameters Parameters   AAROM L heel slides 1x8     AAROM L hip abduction 1x8     Ankle pumps 1x10 1x10 2x10 AB   Seated LAQ  1x10 2x10 AB   Seated hip flexion  1x10    Knee squeezes   x10 AB                Braces/Orthotics/Lines/Etc:   · room air  Treatment/Session Assessment:    · Response to Treatment:Tearful  · Interdisciplinary Collaboration:  · Physical Therapist, Registered Nurse and    · After treatment position/precautions:  · Supine in bed, Bed/Chair-wheels locked, Bed in low position, Call light within reach and RN notified  · Compliance with Program/Exercises: Will assess as treatment progresses. · Recommendations/Intent for next treatment session: \"Next visit will focus on advancements to more challenging activities and reduction in assistance provided\".   Total Treatment Duration:  PT Patient Time In/Time Out  Time In: 1055  Time Out: Blanca 621, PT

## 2017-04-03 NOTE — PROGRESS NOTES
Warfarin dosing per pharmacist    Denny Lawson is a 66 y.o. male. Height: 5' 6\" (167.6 cm)    Weight: 126.2 kg (278 lb 4.8 oz)    Indication:  Bilateral LE DVT, thrombosed IVC    Goal INR:  2-3    Home dose:  New start    Risk factors or significant drug interactions: Other anticoagulants:  Heparin bridge - Stopped 3/29/17    Daily Monitoring  Date  INR     Warfarin dose HGB              Notes  3/24  1.8  5 mg  9.4  3/25  1.1  5 mg   ---  3/26  1.2  5 mg  7.1  3/27  1.7  4 mg  ---  3/28  2.0  4 mg  9.1  3/29  2.2  4 mg  ---  3/30  2.5  4 mg  8.4  3/31  2.7  4 mg  8.6  4/1  2.9  3 mg  8.4  4/2  2.9  3 mg  8.6  4/3  2.6  4 mg  8.8    Pharmacy consulted to dose coumadin on 3/24. Pt s/p EKOS and started on coumadin bridged with heparin. Pharmacy re-consulted on 3/30 after warfarin was briefly stopped as patient worked up for possible bleed. No doses missed though. INR trended down to 2.6 after giving 3 mg X 2 days. Based on dosing so far and trends in the INR, will tentatively try a total of 26 mg weekly. Will give 4 mg each night on Monday through Saturday and will give 2 mg on Sunday. Will continue to follow INR daily.     Thank you,  Mark Mejia, PharmD  Clinical Pharmacist  [phone]

## 2017-04-03 NOTE — CONSULTS
PM&R Consult Progress Note      Patient: Abran Hahn  Admit Date: 3/21/2017  Admit Diagnosis: DVT, lower extremity, proximal, acute, bilateral ([facility]  Recommendations: Continue Acute Rehab Program, Coordination of rehab/medical care, Counseling of PM & R care issues management, Subacute Rehab  Patient is making more effort but remains very debilitated. Unfortunately, not meeting inpt rehab criteria. Has not tolerated any time oob even in chair. He has been bed bound for almost 2 mos. Will need longer than IRC can give to be able to function at home. Wife was agreeable to SNF and has chosen her top three when I spoke to her on Friday.  -will sign off   History/Subjective/Complaint:    Records reviewed. Pain 1  Pain Scale 1: Visual (04/03/17 1206)  Pain Intensity 1: 0 (04/03/17 1206)  Patient Stated Pain Goal: 0 (04/03/17 0554)  Pain Reassessment 1: Yes (04/03/17 0554)  Pain Onset 1: ongoing (04/03/17 1055)  Pain Location 1: Leg (04/03/17 1055)  Pain Orientation 1: Left (04/03/17 1055)  Pain Description 1: Aching (04/03/17 0846)  Pain Intervention(s) 1: Medication (see MAR) (04/03/17 1128)     Objective:     Vitals:  Patient Vitals for the past 8 hrs:   BP Temp Pulse Resp SpO2   04/03/17 1118 115/78 98.3 °F (36.8 °C) 79 19 98 %   04/03/17 0720 101/59 97.9 °F (36.6 °C) 83 19 95 %      Intake and Output:        Allergies   Allergen Reactions    Sulfite Hives     Current Facility-Administered Medications   Medication Dose Route Frequency    oxyCODONE IR (ROXICODONE) tablet 10 mg  10 mg Oral Q6H    warfarin (COUMADIN) tablet 4 mg  4 mg Oral Once per day on Mon Tue Wed Thu Fri Sat    [START ON 4/9/2017] warfarin (COUMADIN) tablet 2 mg  2 mg Oral Q7D    nitrofurantoin (MACRODANTIN) capsule 100 mg  100 mg Oral Q6H    diphenhydrAMINE (BENADRYL) capsule 25 mg  25 mg Oral Q4H PRN    phenazopyridine (PYRIDIUM) tab 190 mg  190 mg Oral TID PRN    oxyCODONE ER (OxyCONTIN) tablet 10 mg  10 mg Oral Q12H    gabapentin (NEURONTIN) capsule 100 mg  100 mg Oral QHS    citalopram (CELEXA) tablet 20 mg  20 mg Oral DAILY    acetaminophen (TYLENOL) tablet 650 mg  650 mg Oral Q6H PRN    famotidine (PEPCID) tablet 20 mg  20 mg Oral BID    Saccharomyces boulardii (FLORASTOR) capsule 250 mg  250 mg Oral BID    tamsulosin (FLOMAX) capsule 0.4 mg  0.4 mg Oral DAILY    olmesartan (BENICAR) tablet 40 mg  40 mg Oral DAILY    bisacodyl (DULCOLAX) suppository 10 mg  10 mg Rectal DAILY PRN    LORazepam (ATIVAN) tablet 1 mg  1 mg Oral BID PRN    zolpidem (AMBIEN) tablet 5 mg  5 mg Oral QHS PRN    ondansetron (ZOFRAN) injection 4 mg  4 mg IntraVENous Q6H PRN    morphine injection 4 mg  4 mg IntraVENous Q4H PRN    hydrALAZINE (APRESOLINE) 20 mg/mL injection 20 mg  20 mg IntraVENous Q6H PRN    sodium chloride (NS) flush 5-10 mL  5-10 mL IntraVENous Q8H    sodium chloride (NS) flush 5-10 mL  5-10 mL IntraVENous PRN    insulin lispro (HUMALOG) injection   SubCUTAneous AC&HS       Functional Assessment:  Gross Assessment  AROM:  (LUE limited, some function, edema: RUE decreased, functional) (03/29/17 0900)  Strength:  (LUE limited, some function, edema: RUE decreased, functional) (03/29/17 0900)  Coordination: Generally decreased, functional (03/29/17 0900)           Bed Mobility  Rolling: Minimum assistance (04/03/17 1246)  Supine to Sit: Minimum assistance; Moderate assistance (04/03/17 1246)  Sit to Supine: Minimum assistance (04/03/17 1246)  Scooting: Moderate assistance (04/03/17 1246)     Balance  Sitting: Impaired (04/03/17 1246)  Sitting - Static: Good (unsupported) (04/03/17 1246)  Sitting - Dynamic: Prop sitting (04/03/17 1246)  Standing: Impaired (04/03/17 1246)  Standing - Static: Constant support (04/03/17 1246)  Standing - Dynamic : Poor (04/03/17 1246)       Bed/Mat Mobility  Rolling: Minimum assistance (04/03/17 1246)  Supine to Sit: Minimum assistance; Moderate assistance (04/03/17 1246)  Sit to Supine: Minimum assistance (04/03/17 1246)  Sit to Stand: Maximum assistance;Assist x2 (04/03/17 1246)  Bed to Chair: Other (comment) (not attempted due to decreased safety of pt in standing) (04/02/17 1100)  Scooting: Moderate assistance (04/03/17 1246)     Labs/Studies:  Recent Results (from the past 72 hour(s))   GLUCOSE, POC    Collection Time: 03/31/17  4:38 PM   Result Value Ref Range    Glucose (POC) 114 (H) 65 - 100 mg/dL   GLUCOSE, POC    Collection Time: 03/31/17  7:29 PM   Result Value Ref Range    Glucose (POC) 124 (H) 65 - 100 mg/dL   PROTHROMBIN TIME + INR    Collection Time: 04/01/17  7:08 AM   Result Value Ref Range    Prothrombin time 32.3 (H) 9.6 - 12.0 sec    INR 2.9 (H) 0.9 - 1.2     METABOLIC PANEL, COMPREHENSIVE    Collection Time: 04/01/17  7:08 AM   Result Value Ref Range    Sodium 140 136 - 145 mmol/L    Potassium 3.9 3.5 - 5.1 mmol/L    Chloride 104 98 - 107 mmol/L    CO2 28 21 - 32 mmol/L    Anion gap 8 7 - 16 mmol/L    Glucose 109 (H) 65 - 100 mg/dL    BUN 18 8 - 23 MG/DL    Creatinine 0.88 0.8 - 1.5 MG/DL    GFR est AA >60 >60 ml/min/1.73m2    GFR est non-AA >60 >60 ml/min/1.73m2    Calcium 8.3 8.3 - 10.4 MG/DL    Bilirubin, total 0.6 0.2 - 1.1 MG/DL    ALT (SGPT) 13 12 - 65 U/L    AST (SGOT) 25 15 - 37 U/L    Alk.  phosphatase 108 50 - 136 U/L    Protein, total 5.5 (L) 6.3 - 8.2 g/dL    Albumin 1.9 (L) 3.2 - 4.6 g/dL    Globulin 3.6 (H) 2.3 - 3.5 g/dL    A-G Ratio 0.5 (L) 1.2 - 3.5     CBC WITH AUTOMATED DIFF    Collection Time: 04/01/17  7:08 AM   Result Value Ref Range    WBC 7.5 4.3 - 11.1 K/uL    RBC 2.88 (L) 4.23 - 5.67 M/uL    HGB 8.4 (L) 13.6 - 17.2 g/dL    HCT 27.4 (L) 41.1 - 50.3 %    MCV 95.1 79.6 - 97.8 FL    MCH 29.2 26.1 - 32.9 PG    MCHC 30.7 (L) 31.4 - 35.0 g/dL    RDW 17.5 (H) 11.9 - 14.6 %    PLATELET 124 257 - 738 K/uL    MPV 9.0 (L) 10.8 - 14.1 FL    DF AUTOMATED      NEUTROPHILS 70 43 - 78 %    LYMPHOCYTES 16 13 - 44 %    MONOCYTES 9 4.0 - 12.0 %    EOSINOPHILS 3 0.5 - 7.8 %    BASOPHILS 1 0.0 - 2.0 %    IMMATURE GRANULOCYTES 1.5 0.0 - 5.0 %    ABS. NEUTROPHILS 5.2 1.7 - 8.2 K/UL    ABS. LYMPHOCYTES 1.2 0.5 - 4.6 K/UL    ABS. MONOCYTES 0.7 0.1 - 1.3 K/UL    ABS. EOSINOPHILS 0.3 0.0 - 0.8 K/UL    ABS. BASOPHILS 0.0 0.0 - 0.2 K/UL    ABS. IMM. GRANS. 0.1 0.0 - 0.5 K/UL   GLUCOSE, POC    Collection Time: 04/01/17  7:59 AM   Result Value Ref Range    Glucose (POC) 108 (H) 65 - 100 mg/dL   GLUCOSE, POC    Collection Time: 04/01/17 11:44 AM   Result Value Ref Range    Glucose (POC) 132 (H) 65 - 100 mg/dL   GLUCOSE, POC    Collection Time: 04/01/17  3:46 PM   Result Value Ref Range    Glucose (POC) 119 (H) 65 - 100 mg/dL   GLUCOSE, POC    Collection Time: 04/01/17  8:22 PM   Result Value Ref Range    Glucose (POC) 109 (H) 65 - 100 mg/dL   PROTHROMBIN TIME + INR    Collection Time: 04/02/17  5:55 AM   Result Value Ref Range    Prothrombin time 31.6 (H) 9.6 - 12.0 sec    INR 2.9 (H) 0.9 - 1.2     METABOLIC PANEL, COMPREHENSIVE    Collection Time: 04/02/17  5:55 AM   Result Value Ref Range    Sodium 143 136 - 145 mmol/L    Potassium 4.0 3.5 - 5.1 mmol/L    Chloride 107 98 - 107 mmol/L    CO2 30 21 - 32 mmol/L    Anion gap 6 (L) 7 - 16 mmol/L    Glucose 96 65 - 100 mg/dL    BUN 19 8 - 23 MG/DL    Creatinine 1.03 0.8 - 1.5 MG/DL    GFR est AA >60 >60 ml/min/1.73m2    GFR est non-AA >60 >60 ml/min/1.73m2    Calcium 8.2 (L) 8.3 - 10.4 MG/DL    Bilirubin, total 0.4 0.2 - 1.1 MG/DL    ALT (SGPT) 15 12 - 65 U/L    AST (SGOT) 40 (H) 15 - 37 U/L    Alk.  phosphatase 121 50 - 136 U/L    Protein, total 5.8 (L) 6.3 - 8.2 g/dL    Albumin 1.8 (L) 3.2 - 4.6 g/dL    Globulin 4.0 (H) 2.3 - 3.5 g/dL    A-G Ratio 0.5 (L) 1.2 - 3.5     CBC WITH AUTOMATED DIFF    Collection Time: 04/02/17  5:55 AM   Result Value Ref Range    WBC 7.9 4.3 - 11.1 K/uL    RBC 2.94 (L) 4.23 - 5.67 M/uL    HGB 8.6 (L) 13.6 - 17.2 g/dL    HCT 28.4 (L) 41.1 - 50.3 %    MCV 96.6 79.6 - 97.8 FL    MCH 29.3 26.1 - 32.9 PG    MCHC 30.3 (L) 31.4 - 35.0 g/dL    RDW 17.8 (H) 11.9 - 14.6 %    PLATELET 652 270 - 122 K/uL    MPV 9.3 (L) 10.8 - 14.1 FL    DF AUTOMATED      NEUTROPHILS 67 43 - 78 %    LYMPHOCYTES 18 13 - 44 %    MONOCYTES 8 4.0 - 12.0 %    EOSINOPHILS 4 0.5 - 7.8 %    BASOPHILS 1 0.0 - 2.0 %    IMMATURE GRANULOCYTES 2.2 0.0 - 5.0 %    ABS. NEUTROPHILS 5.2 1.7 - 8.2 K/UL    ABS. LYMPHOCYTES 1.5 0.5 - 4.6 K/UL    ABS. MONOCYTES 0.7 0.1 - 1.3 K/UL    ABS. EOSINOPHILS 0.3 0.0 - 0.8 K/UL    ABS. BASOPHILS 0.1 0.0 - 0.2 K/UL    ABS. IMM.  GRANS. 0.2 0.0 - 0.5 K/UL   GLUCOSE, POC    Collection Time: 04/02/17  6:47 AM   Result Value Ref Range    Glucose (POC) 104 (H) 65 - 100 mg/dL   GLUCOSE, POC    Collection Time: 04/02/17  5:11 PM   Result Value Ref Range    Glucose (POC) 109 (H) 65 - 100 mg/dL   GLUCOSE, POC    Collection Time: 04/02/17  7:53 PM   Result Value Ref Range    Glucose (POC) 107 (H) 65 - 100 mg/dL   PROTHROMBIN TIME + INR    Collection Time: 04/03/17  6:47 AM   Result Value Ref Range    Prothrombin time 29.0 (H) 9.6 - 12.0 sec    INR 2.6 (H) 0.9 - 1.2     CBC W/O DIFF    Collection Time: 04/03/17  6:47 AM   Result Value Ref Range    WBC 6.9 4.3 - 11.1 K/uL    RBC 2.96 (L) 4.23 - 5.67 M/uL    HGB 8.8 (L) 13.6 - 17.2 g/dL    HCT 28.8 (L) 41.1 - 50.3 %    MCV 97.3 79.6 - 97.8 FL    MCH 29.7 26.1 - 32.9 PG    MCHC 30.6 (L) 31.4 - 35.0 g/dL    RDW 17.7 (H) 11.9 - 14.6 %    PLATELET 771 695 - 168 K/uL    MPV 9.1 (L) 10.8 - 14.1 FL   GLUCOSE, POC    Collection Time: 04/03/17  8:14 AM   Result Value Ref Range    Glucose (POC) 100 65 - 100 mg/dL   GLUCOSE, POC    Collection Time: 04/03/17 12:03 PM   Result Value Ref Range    Glucose (POC) 102 (H) 65 - 100 mg/dL        Assessment:     Principal Problem:    DVT, lower extremity, proximal, acute ([facility] [address] (3/21/2017)    Active Problems:    Type 2 diabetes mellitus without complication ([facility] [address] (0/21/9036)      Benign non-nodular prostatic hyperplasia without lower urinary tract symptoms (7/22/2016)      Hypertriglyceridemia (7/22/2016)      Depression (7/22/2016)      Obstructive sleep apnea syndrome (7/22/2016)      Overview: Home CPAP      Morbid obesity due to excess calories ([facility] [address] (7/22/2016)      Primary insomnia (7/22/2016)      Benign essential HTN (9/13/2016)        Plan:     Recommendations: Continue Acute Rehab Program  Coordination of rehab/medical care  Counseling of PM & R care issues management  Monitoring and management of medical conditions per plan of care/orders   SNF for reasons stated above  Discussion with Family/Caregiver/Staff  Reviewed Therapies/Labs/Medications/Records    Signed By:  Stefan Habermann, MD     April 3, 2017

## 2017-04-04 VITALS
WEIGHT: 278.3 LBS | DIASTOLIC BLOOD PRESSURE: 69 MMHG | HEART RATE: 68 BPM | OXYGEN SATURATION: 98 % | RESPIRATION RATE: 19 BRPM | BODY MASS INDEX: 44.72 KG/M2 | SYSTOLIC BLOOD PRESSURE: 137 MMHG | HEIGHT: 66 IN | TEMPERATURE: 98.5 F

## 2017-04-04 LAB
ERYTHROCYTE [DISTWIDTH] IN BLOOD BY AUTOMATED COUNT: 17.6 % (ref 11.9–14.6)
GLUCOSE BLD STRIP.AUTO-MCNC: 109 MG/DL (ref 65–100)
GLUCOSE BLD STRIP.AUTO-MCNC: 99 MG/DL (ref 65–100)
HCT VFR BLD AUTO: 32 % (ref 41.1–50.3)
HGB BLD-MCNC: 9.7 G/DL (ref 13.6–17.2)
INR PPP: 2.3 (ref 0.9–1.2)
MCH RBC QN AUTO: 29.5 PG (ref 26.1–32.9)
MCHC RBC AUTO-ENTMCNC: 30.3 G/DL (ref 31.4–35)
MCV RBC AUTO: 97.3 FL (ref 79.6–97.8)
PLATELET # BLD AUTO: 384 K/UL (ref 150–450)
PMV BLD AUTO: 9.2 FL (ref 10.8–14.1)
PROTHROMBIN TIME: 25.6 SEC (ref 9.6–12)
RBC # BLD AUTO: 3.29 M/UL (ref 4.23–5.67)
WBC # BLD AUTO: 6.1 K/UL (ref 4.3–11.1)

## 2017-04-04 PROCEDURE — 85027 COMPLETE CBC AUTOMATED: CPT | Performed by: INTERNAL MEDICINE

## 2017-04-04 PROCEDURE — 74011250637 HC RX REV CODE- 250/637: Performed by: INTERNAL MEDICINE

## 2017-04-04 PROCEDURE — 74011250637 HC RX REV CODE- 250/637: Performed by: PHYSICAL MEDICINE & REHABILITATION

## 2017-04-04 PROCEDURE — 85610 PROTHROMBIN TIME: CPT | Performed by: INTERNAL MEDICINE

## 2017-04-04 PROCEDURE — 97530 THERAPEUTIC ACTIVITIES: CPT

## 2017-04-04 PROCEDURE — 82962 GLUCOSE BLOOD TEST: CPT

## 2017-04-04 PROCEDURE — 36415 COLL VENOUS BLD VENIPUNCTURE: CPT | Performed by: INTERNAL MEDICINE

## 2017-04-04 RX ORDER — WARFARIN 2 MG/1
2 TABLET ORAL
Qty: 1 TAB | Refills: 0 | Status: ON HOLD
Start: 2017-04-09 | End: 2017-05-02

## 2017-04-04 RX ORDER — WARFARIN 4 MG/1
4 TABLET ORAL DAILY
Qty: 1 TAB | Refills: 0 | Status: ON HOLD | OUTPATIENT
Start: 2017-04-04 | End: 2017-05-02

## 2017-04-04 RX ORDER — HYDROCODONE BITARTRATE AND ACETAMINOPHEN 5; 325 MG/1; MG/1
2 TABLET ORAL
Qty: 20 TAB | Refills: 0 | Status: ON HOLD | OUTPATIENT
Start: 2017-04-04 | End: 2017-05-02

## 2017-04-04 RX ADMIN — NITROFURANTOIN MACROCRYSTALS 100 MG: 50 CAPSULE ORAL at 05:17

## 2017-04-04 RX ADMIN — CITALOPRAM HYDROBROMIDE 20 MG: 20 TABLET ORAL at 10:13

## 2017-04-04 RX ADMIN — Medication 10 ML: at 05:17

## 2017-04-04 RX ADMIN — OLMESARTAN MEDOXOMIL 40 MG: 40 TABLET, FILM COATED ORAL at 10:12

## 2017-04-04 RX ADMIN — OXYCODONE HYDROCHLORIDE 10 MG: 10 TABLET, FILM COATED, EXTENDED RELEASE ORAL at 10:13

## 2017-04-04 RX ADMIN — OXYCODONE HYDROCHLORIDE 10 MG: 5 TABLET ORAL at 12:45

## 2017-04-04 RX ADMIN — NITROFURANTOIN MACROCRYSTALS 100 MG: 50 CAPSULE ORAL at 12:45

## 2017-04-04 RX ADMIN — TAMSULOSIN HYDROCHLORIDE 0.4 MG: 0.4 CAPSULE ORAL at 10:12

## 2017-04-04 RX ADMIN — RDII 250 MG CAPSULE 250 MG: at 10:12

## 2017-04-04 RX ADMIN — FAMOTIDINE 20 MG: 20 TABLET, FILM COATED ORAL at 10:13

## 2017-04-04 RX ADMIN — OXYCODONE HYDROCHLORIDE 10 MG: 5 TABLET ORAL at 05:17

## 2017-04-04 NOTE — PROGRESS NOTES
Warfarin dosing per pharmacist    Whit Campos is a 66 y.o. male. Height: 5' 6\" (167.6 cm)    Weight: 126.2 kg (278 lb 4.8 oz)    Indication:  Bilateral LE DVT, thrombosed IVC    Goal INR:  2-3    Home dose:  New start    Risk factors or significant drug interactions: Other anticoagulants:  Heparin bridge - Stopped 3/29/17    Daily Monitoring  Date  INR     Warfarin dose HGB              Notes  3/24  1.8  5 mg  9.4  3/25  1.1  5 mg   ---  3/26  1.2  5 mg  7.1  3/27  1.7  4 mg  ---  3/28  2.0  4 mg  9.1  3/29  2.2  4 mg  ---  3/30  2.5  4 mg  8.4  3/31  2.7  4 mg  8.6  4/1  2.9  3 mg  8.4  4/2  2.9  3 mg  8.6  4/3  2.6  4 mg  8.8  4/4  2.3  4 mg  9.7    Pharmacy consulted to dose coumadin on 3/24. Pt s/p EKOS and started on coumadin bridged with heparin. Pharmacy re-consulted on 3/30 after warfarin was briefly stopped as patient worked up for possible bleed. No doses missed though. INR continues to trend down slightly due to receiving 3 mg X 2 days over the weekend, but would expect to level out by tomorrow. Based on dosing so far and trends in the INR, will tentatively try a total of 26 mg weekly. Will give 4 mg each night on Monday through Saturday and will give 2 mg on Sunday. Will continue to follow INR daily.     Thank you,  Ana Engle, PharmD  Clinical Pharmacist  [phone]

## 2017-04-04 NOTE — PROGRESS NOTES
PT Note addendum. During PT session this am, patient's CPAP machine accidentally knocked onto floor. Small plastic cover popped off and patient replaced it. Notified RT Felix Li who checked CPAP machine and stated that it appeared to be functioning properly. RT noted a missing knob (PT also noticed this before machine fell). RT asked patient about missing knob and he replied that it had been missing for several years. Notified Allen Quiñones in patient relations of events.   A Sheila Fix, PT

## 2017-04-04 NOTE — PROGRESS NOTES
Care Management Interventions  PCP Verified by CM: Yes  Mode of Transport at Discharge: [address]  [address] Time of Discharge: 1400  Transition of Care Consult (CM Consult): Discharge Planning, SNF  Physical Therapy Consult: Yes  Occupational Therapy Consult: Yes  Current Support Network: Lives with Spouse, Nursing Facility  Confirm Follow Up Transport: Family  Plan discussed with Pt/Family/Caregiver: Yes  Freedom of Choice Offered: Yes  Discharge Location  Discharge Placement: Skilled nursing facility    Bed offer received from the [facility] advised spouse and patient. Pt will go to room 315 Report line is [phone].

## 2017-04-04 NOTE — PROGRESS NOTES
Problem: Mobility Impaired (Adult and Pediatric)  Goal: *Acute Goals and Plan of Care (Insert Text)  STG:  (1.)Mr. Cullen Browning will move from supine to sit and sit to supine , scoot up and down and roll side to side with MINIMAL ASSIST within 5 day(s). (2.)Mr. Cullen Browning will transfer from bed to chair and chair to bed with MODERATE ASSIST using the least restrictive device within 5 day(s). (3.)Mr. Cullen Browning will ambulate with MODERATE ASSIST for 5 feet with the least restrictive device within 5 day(s). (4.)Mr. Cullen Browning will tolerate 25 minutes of therapeutic activity/exercise within 5 days in order to Improve activity tolerance for mobility. (5.)Mr. Cullen Browning will perform LE exercises with 1 to 2 cues for form within 3 days to improve strength for functional transfers and ambulation. LTG:  (1.)Mr. Cullen Browning will move from supine to sit and sit to supine , scoot up and down and roll side to side in bed with CONTACT GUARD ASSIST within 10 day(s). (2.)Mr. Cullen Browning will transfer from bed to chair and chair to bed with MINIMAL ASSIST using the least restrictive device within 10 day(s). (3.)Mr. Cullen Browning will ambulate with MINIMAL ASSIST for 25 feet with the least restrictive device within 10 day(s). Goals to be updated as patient progresses. ________________________________________________________________________________________________      PHYSICAL THERAPY: Daily Note, Treatment Day: 7th and AM 4/4/2017  INPATIENT: Hospital Day: 15  [place] / [place] / Product Type: Medicare /      NAME/AGE/GENDER: Freedom Whitehead is a 66 y.o. male            PRIMARY DIAGNOSIS: DVT, lower extremity, proximal, acute, bilateral (HCC) DVT, lower extremity, proximal, acute ([facility] [address] DVT, lower extremity, proximal, acute ([facility] [address]        ICD-10: Treatment Diagnosis:       · Difficulty in walking, Not elsewhere classified (R26.2)   Precaution/Allergies:  Sulfite       ASSESSMENT:      Torey Browning is a 66 y.o. male admitted with extensive DVTs of the LEs. Pt today has continued complaints of LE pain. Worked on positioning in bed and bridging. He required min-modA x 1 to sit up at the edge of the bed with max cueing for sequencing and hand placement in flat bed. Able to scoot forward with mod A for better position prior to standing. He required maxA x 2 to perform sit to stand and maintain standing for 10 seconds at RW, but balance poor and unable to fully extend hips/knees. Worked on sit pivot to [employer] chair with mod A of 2. Patient did demonstrate progress with bed mobility today as the bed was flat and he required the same amount of assistance. Pt very motivated to improve, but limited by discomfort with mobility and edema. Will continue POC and PT efforts. This section established at most recent assessment   PROBLEM LIST (Impairments causing functional limitations):  1. Decreased Strength  2. Decreased ADL/Functional Activities  3. Decreased Transfer Abilities  4. Decreased Ambulation Ability/Technique  5. Decreased Balance  6. Increased Pain  7. Decreased Knowledge of Precautions  8. Decreased [place] with Home Exercise Program    INTERVENTIONS PLANNED: (Benefits and precautions of physical therapy have been discussed with the patient.)  1. Balance Exercise  2. Bed Mobility  3. Family Education  4. Gait Training  5. Home Exercise Program (HEP)  6. Therapeutic Activites  7. Therapeutic Exercise/Strengthening  8. Transfer Training  9. Patient Education  10. Group Therapy      TREATMENT PLAN: Frequency/Duration: 3 times a week for duration of hospital stay  Rehabilitation Potential For Stated Goals: FAIR      RECOMMENDED REHABILITATION/EQUIPMENT: (at time of discharge pending progress): Continue Skilled Therapy and Rehab.                    HISTORY:   History of Present Injury/Illness (Reason for Referral):  Per MD Note: \"Eliud Miner is a 66 y.o. male that presented to the ED with 2 week history of worsening swelling of the bilateral lower extremities with increasing difficulty with ambulation. He had OP US today showing extensive DVT. He is currently undergoing rehab at [facility] following admission at [facility] for [facility] requiring 2 runs of HD. He has history of DVT in 2013 and had IVC filter placed at that time. Patient denies dyspnea or chest pain. The hospitalist have been asked to admit. \"  Past Medical History/Comorbidities:   Mr. Estanislao Goodpasture  has a past medical history of Calculus of kidney; Diabetes ([address] and DVT (deep venous thrombosis) ([facility] [address] (2013). Mr. Estanislao Goodpasture  has a past surgical history that includes urological.  Social History/Living Environment:   Home Environment: Skilled nursing facility  One/Two Story Residence: One story  Living Alone: No  Support Systems: Spouse/Significant Other/Partner  Patient Expects to be Discharged to[de-identified] Private residence  Current DME Used/Available at Home: None  Tub or Shower Type: Tub/Shower combination  Prior Level of Function/Work/Activity:  Patient ambulatory prior to initial hospitalization. Ambulating in rehab. Number of Personal Factors/Comorbidities that affect the Plan of Care: 1-2: MODERATE COMPLEXITY   EXAMINATION:   Most Recent Physical Functioning:   Gross Assessment:                  Posture:     Balance:  Sitting: Impaired  Sitting - Static: Good (unsupported)  Sitting - Dynamic: Prop sitting  Standing: Impaired  Standing - Static: Constant support Bed Mobility:  Rolling: Minimum assistance  Supine to Sit: Minimum assistance; Moderate assistance (from flat bed)  Scooting: Moderate assistance  Interventions: Safety awareness training; Tactile cues; Verbal cues  Duration: 25 Minutes  Wheelchair Mobility:     Transfers:  Bed to Chair: Moderate assistance;Assist x2 (via squat pivot)  Gait:             Body Structures Involved:  1. Heart  2. Lungs  3. Muscles Body Functions Affected:  1. Sensory/Pain  2. Hematological  3. Neuromusculoskeletal  4.  Movement Related Activities and Participation Affected:  1. Mobility  2. Self Care  3. Domestic Life  4. Interpersonal Interactions and Relationships  5. Community, Social and [place]   Number of elements that affect the Plan of Care: 4+: HIGH COMPLEXITY   CLINICAL PRESENTATION:   Presentation: Evolving clinical presentation with changing clinical characteristics: MODERATE COMPLEXITY   CLINICAL DECISION MAKIN [facility] Mobility Inpatient Short Form  How much difficulty does the patient currently have. .. Unable A Lot A Little None   1. Turning over in bed (including adjusting bedclothes, sheets and blankets)? [ ] 1   [X] 2   [ ] 3   [ ] 4   2. Sitting down on and standing up from a chair with arms ( e.g., wheelchair, bedside commode, etc.)   [X] 1   [ ] 2   [ ] 3   [ ] 4   3. Moving from lying on back to sitting on the side of the bed? [ ] 1   [X] 2   [ ] 3   [ ] 4   How much help from another person does the patient currently need. .. Total A Lot A Little None   4. Moving to and from a bed to a chair (including a wheelchair)? [X] 1   [ ] 2   [ ] 3   [ ] 4   5. Need to walk in hospital room? [X] 1   [ ] 2   [ ] 3   [ ] 4   6. Climbing 3-5 steps with a railing? [X] 1   [ ] 2   [ ] 3   [ ] 4   © , Trustees of [address] [place] [address] [address] [address], under license to [employer]. All rights reserved    Score:  Initial: 8 Most Recent: X (Date: -- )     Interpretation of Tool:  Represents activities that are increasingly more difficult (i.e. Bed mobility, Transfers, Gait).        Score 24 23 22-20 19-15 14-10 9-7 6       Modifier CH CI CJ CK CL CM CN         · Mobility - Walking and Moving Around:               - CURRENT STATUS:    CM - 80%-99% impaired, limited or restricted               - GOAL STATUS:           CL - 60%-79% impaired, limited or restricted               - D/C STATUS:                       ---------------To be determined---------------  [place] / [place] / Product Type: Medicare /       Medical Necessity:     · Patient demonstrates good rehab potential due to higher previous functional level. Reason for Services/Other Comments:  · Patient continues to require modification of therapeutic interventions to increase complexity of exercises. Use of outcome tool(s) and clinical judgement create a POC that gives a: Questionable prediction of patient's progress: MODERATE COMPLEXITY                 TREATMENT:      Pre-treatment Symptoms/Complaints: leg pain noted  Pain: Initial: 8/10     Post Session: 8/10      Therapeutic Activity: (25 Minutes  ):  Therapeutic activities including Bed mobility, seated balance and positioning/scooting, sit to stand, and sit pivot transfer to improve mobility, strength, balance, coordination and activity tolerance. Required max verbal/manual cues to promote static balance in standing and promote coordination of bilateral, lower extremity(s) and for correct hand and foot placement. Therapeutic Exercise: (  ):  Exercises per grid below to improve mobility, strength, balance and coordination. Required minimal visual and verbal cues to promote proper body alignment. Progressed complexity of movement as indicated. Date:  3/27/17 Date:  3/30/17 Date:  4/03/17   Activity/Exercise Parameters Parameters Parameters   AAROM L heel slides 1x8     AAROM L hip abduction 1x8     Ankle pumps 1x10 1x10 2x10 AB   Seated LAQ  1x10 2x10 AB   Seated hip flexion  1x10    Knee squeezes   x10 AB                Braces/Orthotics/Lines/Etc:   · room air  Treatment/Session Assessment:    · Response to Treatment:Tearful  · Interdisciplinary Collaboration:  · Physical Therapist, Registered Nurse, Physician and    · After treatment position/precautions:  · Up in chair, Bed/Chair-wheels locked, Bed in low position, Call light within reach and RN notified  · Compliance with Program/Exercises:  Will assess as treatment progresses. · Recommendations/Intent for next treatment session: \"Next visit will focus on advancements to more challenging activities and reduction in assistance provided\".   Total Treatment Duration:  PT Patient Time In/Time Out  Time In: 1050  Time Out: [id number] Jozef Whitley, PT

## 2017-04-04 NOTE — DISCHARGE SUMMARY
Hospitalist Discharge Summary     Patient ID:  David Patrick  [id number]  10 y.o.  1945  Admit date: 3/21/2017  7:10 PM  Discharge date and time: 4/4/2017  Attending: Eliza Cooks, DO  PCP:  Randi Danielson MD  Treatment Team: Attending Provider: Eliza Cooks, DO; Consulting Provider: Quinn Vasquez MD; Care Manager: Fransico Abernathy; Consulting Provider: Silas Payne MD    Principal Diagnosis DVT, lower extremity, proximal, acute ([address]   Principal Problem:    DVT, lower extremity, proximal, acute ([address] (3/21/2017)    Active Problems:    Type 2 diabetes mellitus without complication ([address] (7/87/9657)      Benign non-nodular prostatic hyperplasia without lower urinary tract symptoms (7/22/2016)      Hypertriglyceridemia (7/22/2016)      Depression (7/22/2016)      Obstructive sleep apnea syndrome (7/22/2016)      Overview: Home CPAP      Morbid obesity due to excess calories ([address] (7/22/2016)      Primary insomnia (7/22/2016)      Benign essential HTN (9/13/2016)     HPI: David Patrick is a 66 y.o. male that presented to the ED with 2 week history of worsening swelling of the bilateral lower extremities with increasing difficulty with ambulation. He had OP US today showing extensive DVT. He is currently undergoing rehab at [facility] following admission at [facility] for JIM requiring 2 runs of HD. He has history of DVT in 2013 and had IVC filter placed at that time. Patient denies dyspnea or chest pain. The hospitalist have been asked to admit. Hospital Course:  Please refer to the admission H&P for details of presentation. In summary, the patient presented with bilateral LE swelling and found to have an extensive DVT. Seen by IR and underwent cathter directed tpa. Started on heparin with coumadin bridge. Heparin stopped once coumadin stabilized. Treated for UTI during admission with ucx growing ecoli. tricor held with interactions with coumadin.   Will benefit from continued rehab, currently stable for dc. Labs: Results:       Chemistry Recent Labs      04/02/17   0555   GLU  96   NA  143   K  4.0   CL  107   CO2  30   BUN  19   CREA  1.03   CA  8.2*   AGAP  6*   AP  121   TP  5.8*   ALB  1.8*   GLOB  4.0*   AGRAT  0.5*      CBC w/Diff Recent Labs      04/04/17   0700  04/03/17   0647  04/02/17   0555   WBC  6.1  6.9  7.9   RBC  3.29*  2.96*  2.94*   HGB  9.7*  8.8*  8.6*   HCT  32.0*  28.8*  28.4*   PLT  384  349  381   GRANS   --    --   67   LYMPH   --    --   18   EOS   --    --   4      Cardiac Enzymes No results for input(s): CPK, CKND1, BECK in the last 72 hours. No lab exists for component: CKRMB, TROIP   Coagulation Recent Labs      04/04/17   0700  04/03/17   0647   PTP  25.6*  29.0*   INR  2.3*  2.6*       Lipid Panel Lab Results   Component Value Date/Time    Cholesterol, total 144 08/02/2016 09:28 AM    HDL Cholesterol 27 08/02/2016 09:28 AM    LDL, calculated 42 08/02/2016 09:28 AM    VLDL, calculated 75 08/02/2016 09:28 AM    Triglyceride 377 08/02/2016 09:28 AM      BNP No results for input(s): BNPP in the last 72 hours. Liver Enzymes Recent Labs      04/02/17   0555   TP  5.8*   ALB  1.8*   AP  121   SGOT  40*      Thyroid Studies Lab Results   Component Value Date/Time    T4, Total 4.8 08/02/2016 09:28 AM    TSH 1.720 08/02/2016 09:28 AM            Discharge Exam:  Visit Vitals    /69    Pulse 68    Temp 98.5 °F (36.9 °C)    Resp 19    Ht 5' 6\" (1.676 m)    Wt 126.2 kg (278 lb 4.8 oz)    SpO2 98%    BMI 44.92 kg/m2     General appearance: alert, cooperative, no distress, appears stated age  Lungs: clear to auscultation bilaterally  Heart: regular rate and rhythm, S1, S2 normal, no murmur, click, rub or gallop  Abdomen: soft, non-tender.  Bowel sounds normal. No masses,  no organomegaly  Extremities: no cyanosis or edema  Neurologic: Grossly normal    Disposition: SNF  Discharge Condition: stable  Patient Instructions:   Current Discharge Medication List      START taking these medications    Details   !! warfarin (COUMADIN) 2 mg tablet Take 1 Tab by mouth every seven (7) days. Qty: 1 Tab, Refills: 0      !! warfarin (COUMADIN) 4 mg tablet Take 1 Tab by mouth daily. 4 mg Monday - Saturday  2 mg on Sunday  Indications: DEEP VENOUS THROMBOSIS  Indications: DEEP VENOUS THROMBOSIS  Qty: 1 Tab, Refills: 0       !! - Potential duplicate medications found. Please discuss with provider. CONTINUE these medications which have CHANGED    Details   HYDROcodone-acetaminophen (NORCO) 5-325 mg per tablet Take 2 Tabs by mouth every four (4) hours as needed for Pain. Max Daily Amount: 12 Tabs. Qty: 20 Tab, Refills: 0         CONTINUE these medications which have NOT CHANGED    Details   colestipol (COLESTID) 1 gram tablet TAKE 2 TABLETS (2 G) BY MOUTH 2 (TWO) TIMES A DAY. Refills: 5    Associated Diagnoses: BMI 40.0-44.9, adult ([facility] [address] Acute renal failure, unspecified acute renal failure type ([facility] [address] History of fall; Benign essential HTN; Type 2 diabetes mellitus without complication, without long-term current use of insulin ([facility] [address] Obstructive sleep apnea syndrome; Primary insomnia; Leg swelling; Coronary artery disease involving native coronary artery of native heart without angina pectoris; Compression fracture of spine, with routine healing, subsequent encounter; Shortness of breath      polyethylene glycol (MIRALAX) 17 gram packet Take 17 g by mouth daily. famotidine (PEPCID) 20 mg tablet Take 20 mg by mouth two (2) times a day. Saccharomyces boulardii (FLORASTOR) 250 mg capsule Take 250 mg by mouth two (2) times a day. tamsulosin (FLOMAX) 0.4 mg capsule Take 0.4 mg by mouth daily. cyclobenzaprine (FLEXERIL) 5 mg tablet Take 5 mg by mouth three (3) times daily as needed for Muscle Spasm(s). metFORMIN (GLUCOPHAGE) 500 mg tablet Take 1 Tab by mouth two (2) times daily (with meals).   Qty: 180 Tab, Refills: 3    Associated Diagnoses: Type 2 diabetes mellitus without complication, unspecified long term insulin use status      glucose blood VI test strips (ACCU-CHEK [place] PLUS TEST STRP) strip Check BS Once Daily  Dx E11.9  Qty: 50 Strip, Refills: 4      potassium citrate (UROCIT-K10) 10 mEq (1,080 mg) TbER Take 1 Tab by mouth two (2) times a day. Qty: 180 Tab, Refills: 1      olmesartan (BENICAR) 40 mg tablet Take 1 Tab by mouth daily. Qty: 90 Tab, Refills: 3    Associated Diagnoses: Benign essential HTN      Lancets (ACCU-CHEK FASTCLIX) misc Check BS Once Daily  DX E11.9  Qty: 100 Each, Refills: 1      zolpidem (AMBIEN) 5 mg tablet Take 1 Tab by mouth nightly as needed for Sleep. Max Daily Amount: 5 mg.   Qty: 30 Tab, Refills: 3    Associated Diagnoses: Primary insomnia         STOP taking these medications       fenofibrate nanocrystallized (TRICOR) 145 mg tablet Comments:   Reason for Stopping:               Activity: Activity as tolerated  Diet: Diabetic Diet  Wound Care: None needed    Follow-up  ·   With pcp  Time spent to discharge patient 35 minutes  Signed:  Rich Newman MD  4/4/2017  11:33 AM

## 2017-04-05 ENCOUNTER — PATIENT OUTREACH (OUTPATIENT)
Dept: CASE MANAGEMENT | Age: 72
End: 2017-04-05

## 2017-04-05 LAB
SRA 100IU/ML UFH SER-ACNC: 12 % (ref 0–20)
SRA UFH SER-IMP: ABNORMAL
UNFRAC HEPARIN LOW DOSE: 28 % (ref 0–20)

## 2017-04-05 NOTE — PROGRESS NOTES
This note will not be viewable in [address] Patient discharge to [facility]. [facility] outreaach postponed for 21 days due to discharge to non-preferred network SNF.

## 2017-04-13 ENCOUNTER — HOSPITAL ENCOUNTER (OUTPATIENT)
Dept: LAB | Age: 72
Discharge: HOME | End: 2017-04-13

## 2017-04-13 LAB
INR PPP: 1.8 (ref 0.9–1.2)
PROTHROMBIN TIME: 20.1 SEC (ref 9.6–12)

## 2017-04-13 PROCEDURE — 85610 PROTHROMBIN TIME: CPT | Performed by: FAMILY MEDICINE

## 2017-04-28 NOTE — PROGRESS NOTES
This note will not be viewable in [address] [facility] OUTREACH #1  Initial outreach attempt unsuccessful. Left message for call back. Will attempt 2nd outreach within 24 hrs.

## 2017-05-02 ENCOUNTER — HOSPITAL ENCOUNTER (INPATIENT)
Age: 72
LOS: 4 days | Discharge: NURSING, REHAB OR LONG-TERM CARE | DRG: 699 | End: 2017-05-06
Attending: EMERGENCY MEDICINE | Admitting: INTERNAL MEDICINE
Payer: MEDICARE

## 2017-05-02 ENCOUNTER — APPOINTMENT (OUTPATIENT)
Dept: CT IMAGING | Age: 72
DRG: 699 | End: 2017-05-02
Attending: EMERGENCY MEDICINE
Payer: MEDICARE

## 2017-05-02 DIAGNOSIS — K57.92 DIVERTICULITIS OF INTESTINE WITHOUT PERFORATION OR ABSCESS WITHOUT BLEEDING, UNSPECIFIED PART OF INTESTINAL TRACT: Primary | ICD-10-CM

## 2017-05-02 PROBLEM — N20.0 NEPHROLITHIASIS: Chronic | Status: ACTIVE | Noted: 2017-05-02

## 2017-05-02 PROBLEM — N39.0 CATHETER-ASSOCIATED URINARY TRACT INFECTION (HCC): Status: ACTIVE | Noted: 2017-05-02

## 2017-05-02 PROBLEM — A41.9 SEPSIS (HCC): Status: ACTIVE | Noted: 2017-05-02

## 2017-05-02 PROBLEM — N31.9 NEUROGENIC BLADDER: Chronic | Status: ACTIVE | Noted: 2017-05-02

## 2017-05-02 PROBLEM — I82.409 DVT (DEEP VENOUS THROMBOSIS) (HCC): Chronic | Status: ACTIVE | Noted: 2017-05-02

## 2017-05-02 PROBLEM — Z79.01 CHRONIC ANTICOAGULATION: Chronic | Status: ACTIVE | Noted: 2017-05-02

## 2017-05-02 PROBLEM — T83.511A CATHETER-ASSOCIATED URINARY TRACT INFECTION (HCC): Status: ACTIVE | Noted: 2017-05-02

## 2017-05-02 LAB
ALBUMIN SERPL BCP-MCNC: 3 G/DL (ref 3.2–4.6)
ALBUMIN/GLOB SERPL: 0.7 {RATIO} (ref 1.2–3.5)
ALP SERPL-CCNC: 102 U/L (ref 50–136)
ALT SERPL-CCNC: 33 U/L (ref 12–65)
ANION GAP BLD CALC-SCNC: 9 MMOL/L (ref 7–16)
APPEARANCE UR: ABNORMAL
AST SERPL W P-5'-P-CCNC: 35 U/L (ref 15–37)
BACTERIA SPEC CULT: NORMAL
BACTERIA URNS QL MICRO: 0 /HPF
BASOPHILS # BLD AUTO: 0 K/UL (ref 0–0.2)
BASOPHILS # BLD: 0 % (ref 0–2)
BILIRUB SERPL-MCNC: 0.5 MG/DL (ref 0.2–1.1)
BILIRUB UR QL: NEGATIVE
BUN SERPL-MCNC: 14 MG/DL (ref 8–23)
CALCIUM SERPL-MCNC: 9.4 MG/DL (ref 8.3–10.4)
CHLORIDE SERPL-SCNC: 105 MMOL/L (ref 98–107)
CO2 SERPL-SCNC: 31 MMOL/L (ref 21–32)
COLOR UR: YELLOW
CREAT SERPL-MCNC: 1.04 MG/DL (ref 0.8–1.5)
DIFFERENTIAL METHOD BLD: ABNORMAL
EOSINOPHIL # BLD: 0.1 K/UL (ref 0–0.8)
EOSINOPHIL NFR BLD: 1 % (ref 0.5–7.8)
EPI CELLS #/AREA URNS HPF: ABNORMAL /HPF
ERYTHROCYTE [DISTWIDTH] IN BLOOD BY AUTOMATED COUNT: 16.3 % (ref 11.9–14.6)
GLOBULIN SER CALC-MCNC: 4.4 G/DL (ref 2.3–3.5)
GLUCOSE BLD STRIP.AUTO-MCNC: 100 MG/DL (ref 65–100)
GLUCOSE BLD STRIP.AUTO-MCNC: 110 MG/DL (ref 65–100)
GLUCOSE BLD STRIP.AUTO-MCNC: 87 MG/DL (ref 65–100)
GLUCOSE SERPL-MCNC: 104 MG/DL (ref 65–100)
GLUCOSE UR STRIP.AUTO-MCNC: NEGATIVE MG/DL
HCT VFR BLD AUTO: 43.8 % (ref 41.1–50.3)
HGB BLD-MCNC: 14.2 G/DL (ref 13.6–17.2)
HGB UR QL STRIP: ABNORMAL
IMM GRANULOCYTES # BLD: 0 K/UL (ref 0–0.5)
IMM GRANULOCYTES NFR BLD AUTO: 0.3 % (ref 0–5)
INR PPP: 1.9 (ref 0.9–1.2)
KETONES UR QL STRIP.AUTO: ABNORMAL MG/DL
LACTATE BLD-SCNC: 2.4 MMOL/L (ref 0.5–1.9)
LACTATE SERPL-SCNC: 1.5 MMOL/L (ref 0.4–2)
LEUKOCYTE ESTERASE UR QL STRIP.AUTO: ABNORMAL
LIPASE SERPL-CCNC: 249 U/L (ref 73–393)
LYMPHOCYTES # BLD AUTO: 7 % (ref 13–44)
LYMPHOCYTES # BLD: 0.9 K/UL (ref 0.5–4.6)
MCH RBC QN AUTO: 30.4 PG (ref 26.1–32.9)
MCHC RBC AUTO-ENTMCNC: 32.4 G/DL (ref 31.4–35)
MCV RBC AUTO: 93.8 FL (ref 79.6–97.8)
MONOCYTES # BLD: 0.7 K/UL (ref 0.1–1.3)
MONOCYTES NFR BLD AUTO: 6 % (ref 4–12)
NEUTS SEG # BLD: 10.8 K/UL (ref 1.7–8.2)
NEUTS SEG NFR BLD AUTO: 86 % (ref 43–78)
NITRITE UR QL STRIP.AUTO: NEGATIVE
OTHER OBSERVATIONS,UCOM: ABNORMAL
PH UR STRIP: 6 [PH] (ref 5–9)
PLATELET # BLD AUTO: 222 K/UL (ref 150–450)
PMV BLD AUTO: 10 FL (ref 10.8–14.1)
POTASSIUM SERPL-SCNC: 4.3 MMOL/L (ref 3.5–5.1)
PROT SERPL-MCNC: 7.4 G/DL (ref 6.3–8.2)
PROT UR STRIP-MCNC: 100 MG/DL
PROTHROMBIN TIME: 20.5 SEC (ref 9.6–12)
RBC # BLD AUTO: 4.67 M/UL (ref 4.23–5.67)
RBC #/AREA URNS HPF: >100 /HPF
SERVICE CMNT-IMP: NORMAL
SODIUM SERPL-SCNC: 145 MMOL/L (ref 136–145)
SP GR UR REFRACTOMETRY: 1.02 (ref 1–1.02)
UROBILINOGEN UR QL STRIP.AUTO: 0.2 EU/DL (ref 0.2–1)
WBC # BLD AUTO: 12.6 K/UL (ref 4.3–11.1)
WBC URNS QL MICRO: ABNORMAL /HPF

## 2017-05-02 PROCEDURE — 74011250637 HC RX REV CODE- 250/637: Performed by: EMERGENCY MEDICINE

## 2017-05-02 PROCEDURE — 87040 BLOOD CULTURE FOR BACTERIA: CPT | Performed by: INTERNAL MEDICINE

## 2017-05-02 PROCEDURE — 65270000029 HC RM PRIVATE

## 2017-05-02 PROCEDURE — 36415 COLL VENOUS BLD VENIPUNCTURE: CPT | Performed by: INTERNAL MEDICINE

## 2017-05-02 PROCEDURE — 74011250636 HC RX REV CODE- 250/636: Performed by: INTERNAL MEDICINE

## 2017-05-02 PROCEDURE — 74011000250 HC RX REV CODE- 250: Performed by: INTERNAL MEDICINE

## 2017-05-02 PROCEDURE — 74011250637 HC RX REV CODE- 250/637: Performed by: INTERNAL MEDICINE

## 2017-05-02 PROCEDURE — 83605 ASSAY OF LACTIC ACID: CPT

## 2017-05-02 PROCEDURE — 87641 MR-STAPH DNA AMP PROBE: CPT | Performed by: INTERNAL MEDICINE

## 2017-05-02 PROCEDURE — 85610 PROTHROMBIN TIME: CPT | Performed by: EMERGENCY MEDICINE

## 2017-05-02 PROCEDURE — 96376 TX/PRO/DX INJ SAME DRUG ADON: CPT | Performed by: EMERGENCY MEDICINE

## 2017-05-02 PROCEDURE — 85025 COMPLETE CBC W/AUTO DIFF WBC: CPT | Performed by: EMERGENCY MEDICINE

## 2017-05-02 PROCEDURE — 74011000250 HC RX REV CODE- 250: Performed by: EMERGENCY MEDICINE

## 2017-05-02 PROCEDURE — 74011250636 HC RX REV CODE- 250/636: Performed by: EMERGENCY MEDICINE

## 2017-05-02 PROCEDURE — 74011250636 HC RX REV CODE- 250/636

## 2017-05-02 PROCEDURE — 96366 THER/PROPH/DIAG IV INF ADDON: CPT | Performed by: EMERGENCY MEDICINE

## 2017-05-02 PROCEDURE — 83690 ASSAY OF LIPASE: CPT | Performed by: EMERGENCY MEDICINE

## 2017-05-02 PROCEDURE — 77030005520 HC CATH URETH FOL38 BARD -A

## 2017-05-02 PROCEDURE — 83605 ASSAY OF LACTIC ACID: CPT | Performed by: INTERNAL MEDICINE

## 2017-05-02 PROCEDURE — 96375 TX/PRO/DX INJ NEW DRUG ADDON: CPT | Performed by: EMERGENCY MEDICINE

## 2017-05-02 PROCEDURE — 74176 CT ABD & PELVIS W/O CONTRAST: CPT

## 2017-05-02 PROCEDURE — 82962 GLUCOSE BLOOD TEST: CPT

## 2017-05-02 PROCEDURE — 96365 THER/PROPH/DIAG IV INF INIT: CPT | Performed by: EMERGENCY MEDICINE

## 2017-05-02 PROCEDURE — 74011000258 HC RX REV CODE- 258: Performed by: INTERNAL MEDICINE

## 2017-05-02 PROCEDURE — 0T2BX0Z CHANGE DRAINAGE DEVICE IN BLADDER, EXTERNAL APPROACH: ICD-10-PCS | Performed by: INTERNAL MEDICINE

## 2017-05-02 PROCEDURE — 96367 TX/PROPH/DG ADDL SEQ IV INF: CPT | Performed by: EMERGENCY MEDICINE

## 2017-05-02 PROCEDURE — 81001 URINALYSIS AUTO W/SCOPE: CPT | Performed by: EMERGENCY MEDICINE

## 2017-05-02 PROCEDURE — 87086 URINE CULTURE/COLONY COUNT: CPT | Performed by: EMERGENCY MEDICINE

## 2017-05-02 PROCEDURE — 99284 EMERGENCY DEPT VISIT MOD MDM: CPT | Performed by: EMERGENCY MEDICINE

## 2017-05-02 PROCEDURE — 80053 COMPREHEN METABOLIC PANEL: CPT | Performed by: EMERGENCY MEDICINE

## 2017-05-02 RX ORDER — ZOLPIDEM TARTRATE 5 MG/1
5 TABLET ORAL
Status: DISCONTINUED | OUTPATIENT
Start: 2017-05-02 | End: 2017-05-06 | Stop reason: HOSPADM

## 2017-05-02 RX ORDER — ERGOCALCIFEROL 1.25 MG/1
50000 CAPSULE ORAL
COMMUNITY
End: 2018-11-09 | Stop reason: SDUPTHER

## 2017-05-02 RX ORDER — ESCITALOPRAM OXALATE 10 MG/1
10 TABLET ORAL DAILY
Status: DISCONTINUED | OUTPATIENT
Start: 2017-05-03 | End: 2017-05-06 | Stop reason: HOSPADM

## 2017-05-02 RX ORDER — SODIUM CHLORIDE 9 MG/ML
100 INJECTION, SOLUTION INTRAVENOUS CONTINUOUS
Status: DISCONTINUED | OUTPATIENT
Start: 2017-05-02 | End: 2017-05-03

## 2017-05-02 RX ORDER — LOSARTAN POTASSIUM 100 MG/1
100 TABLET ORAL DAILY
COMMUNITY
End: 2017-05-22

## 2017-05-02 RX ORDER — ONDANSETRON 2 MG/ML
4 INJECTION INTRAMUSCULAR; INTRAVENOUS
Status: DISCONTINUED | OUTPATIENT
Start: 2017-05-02 | End: 2017-05-06 | Stop reason: HOSPADM

## 2017-05-02 RX ORDER — ONDANSETRON 2 MG/ML
4 INJECTION INTRAMUSCULAR; INTRAVENOUS
Status: COMPLETED | OUTPATIENT
Start: 2017-05-02 | End: 2017-05-02

## 2017-05-02 RX ORDER — LOSARTAN POTASSIUM 50 MG/1
100 TABLET ORAL DAILY
Status: DISCONTINUED | OUTPATIENT
Start: 2017-05-03 | End: 2017-05-06 | Stop reason: HOSPADM

## 2017-05-02 RX ORDER — BISMUTH SUBSALICYLATE 262 MG
1 TABLET,CHEWABLE ORAL DAILY
COMMUNITY
End: 2017-05-22

## 2017-05-02 RX ORDER — CIPROFLOXACIN 500 MG/1
500 TABLET ORAL 2 TIMES DAILY
Status: ON HOLD | COMMUNITY
Start: 2017-04-28 | End: 2017-05-06

## 2017-05-02 RX ORDER — METRONIDAZOLE 500 MG/100ML
500 INJECTION, SOLUTION INTRAVENOUS
Status: COMPLETED | OUTPATIENT
Start: 2017-05-02 | End: 2017-05-02

## 2017-05-02 RX ORDER — METRONIDAZOLE 500 MG/100ML
500 INJECTION, SOLUTION INTRAVENOUS EVERY 8 HOURS
Status: DISCONTINUED | OUTPATIENT
Start: 2017-05-02 | End: 2017-05-05

## 2017-05-02 RX ORDER — SODIUM CHLORIDE 0.9 % (FLUSH) 0.9 %
5-10 SYRINGE (ML) INJECTION EVERY 8 HOURS
Status: DISCONTINUED | OUTPATIENT
Start: 2017-05-02 | End: 2017-05-03 | Stop reason: SDUPTHER

## 2017-05-02 RX ORDER — CYCLOBENZAPRINE HCL 10 MG
5 TABLET ORAL
Status: DISCONTINUED | OUTPATIENT
Start: 2017-05-02 | End: 2017-05-02

## 2017-05-02 RX ORDER — LANOLIN ALCOHOL/MO/W.PET/CERES
1000 CREAM (GRAM) TOPICAL DAILY
COMMUNITY

## 2017-05-02 RX ORDER — POLYETHYLENE GLYCOL 3350 17 G/17G
17 POWDER, FOR SOLUTION ORAL DAILY
Status: DISCONTINUED | OUTPATIENT
Start: 2017-05-03 | End: 2017-05-06 | Stop reason: HOSPADM

## 2017-05-02 RX ORDER — TAMSULOSIN HYDROCHLORIDE 0.4 MG/1
0.4 CAPSULE ORAL DAILY
Status: DISCONTINUED | OUTPATIENT
Start: 2017-05-03 | End: 2017-05-06 | Stop reason: HOSPADM

## 2017-05-02 RX ORDER — LEVOFLOXACIN 5 MG/ML
750 INJECTION, SOLUTION INTRAVENOUS
Status: COMPLETED | OUTPATIENT
Start: 2017-05-02 | End: 2017-05-02

## 2017-05-02 RX ORDER — SAME BUTANEDISULFONATE/BETAINE 400-600 MG
250 POWDER IN PACKET (EA) ORAL 2 TIMES DAILY
Status: DISCONTINUED | OUTPATIENT
Start: 2017-05-02 | End: 2017-05-06 | Stop reason: HOSPADM

## 2017-05-02 RX ORDER — LANOLIN ALCOHOL/MO/W.PET/CERES
CREAM (GRAM) TOPICAL
COMMUNITY
End: 2017-05-22

## 2017-05-02 RX ORDER — ESCITALOPRAM OXALATE 10 MG/1
10 TABLET ORAL DAILY
COMMUNITY
End: 2017-09-19 | Stop reason: SDUPTHER

## 2017-05-02 RX ORDER — OLMESARTAN MEDOXOMIL 40 MG/1
40 TABLET ORAL DAILY
Status: DISCONTINUED | OUTPATIENT
Start: 2017-05-03 | End: 2017-05-02

## 2017-05-02 RX ORDER — HYDROCODONE BITARTRATE AND ACETAMINOPHEN 7.5; 325 MG/1; MG/1
1 TABLET ORAL
Status: DISCONTINUED | OUTPATIENT
Start: 2017-05-02 | End: 2017-05-06 | Stop reason: HOSPADM

## 2017-05-02 RX ORDER — MORPHINE SULFATE 4 MG/ML
4 INJECTION, SOLUTION INTRAMUSCULAR; INTRAVENOUS ONCE
Status: COMPLETED | OUTPATIENT
Start: 2017-05-02 | End: 2017-05-02

## 2017-05-02 RX ORDER — WARFARIN 6 MG/1
6 TABLET ORAL DAILY
COMMUNITY
End: 2017-05-22 | Stop reason: SDUPTHER

## 2017-05-02 RX ORDER — ACETAMINOPHEN 500 MG
1000 TABLET ORAL
Status: COMPLETED | OUTPATIENT
Start: 2017-05-02 | End: 2017-05-02

## 2017-05-02 RX ORDER — SODIUM CHLORIDE 0.9 % (FLUSH) 0.9 %
5-10 SYRINGE (ML) INJECTION AS NEEDED
Status: DISCONTINUED | OUTPATIENT
Start: 2017-05-02 | End: 2017-05-03 | Stop reason: SDUPTHER

## 2017-05-02 RX ORDER — METRONIDAZOLE 500 MG/1
500 TABLET ORAL 3 TIMES DAILY
Qty: 30 TAB | Refills: 0 | Status: SHIPPED | OUTPATIENT
Start: 2017-05-02 | End: 2017-05-12

## 2017-05-02 RX ORDER — SODIUM CHLORIDE 0.9 % (FLUSH) 0.9 %
5-10 SYRINGE (ML) INJECTION AS NEEDED
Status: DISCONTINUED | OUTPATIENT
Start: 2017-05-02 | End: 2017-05-06 | Stop reason: HOSPADM

## 2017-05-02 RX ORDER — LEVOFLOXACIN 750 MG/1
750 TABLET ORAL DAILY
Qty: 10 TAB | Refills: 0 | Status: SHIPPED | OUTPATIENT
Start: 2017-05-02 | End: 2017-05-12

## 2017-05-02 RX ORDER — ACETAMINOPHEN 500 MG
500 TABLET ORAL
Status: DISCONTINUED | OUTPATIENT
Start: 2017-05-02 | End: 2017-05-06 | Stop reason: HOSPADM

## 2017-05-02 RX ORDER — MORPHINE SULFATE 4 MG/ML
4 INJECTION, SOLUTION INTRAMUSCULAR; INTRAVENOUS
Status: COMPLETED | OUTPATIENT
Start: 2017-05-02 | End: 2017-05-02

## 2017-05-02 RX ORDER — SODIUM CHLORIDE 0.9 % (FLUSH) 0.9 %
5-10 SYRINGE (ML) INJECTION EVERY 8 HOURS
Status: DISCONTINUED | OUTPATIENT
Start: 2017-05-02 | End: 2017-05-06 | Stop reason: HOSPADM

## 2017-05-02 RX ORDER — METOCLOPRAMIDE HYDROCHLORIDE 5 MG/ML
5 INJECTION INTRAMUSCULAR; INTRAVENOUS EVERY 6 HOURS
Status: DISCONTINUED | OUTPATIENT
Start: 2017-05-02 | End: 2017-05-02

## 2017-05-02 RX ORDER — INSULIN LISPRO 100 [IU]/ML
INJECTION, SOLUTION INTRAVENOUS; SUBCUTANEOUS
Status: DISCONTINUED | OUTPATIENT
Start: 2017-05-02 | End: 2017-05-06 | Stop reason: HOSPADM

## 2017-05-02 RX ORDER — ONDANSETRON 2 MG/ML
INJECTION INTRAMUSCULAR; INTRAVENOUS
Status: COMPLETED
Start: 2017-05-02 | End: 2017-05-02

## 2017-05-02 RX ORDER — ONDANSETRON 2 MG/ML
INJECTION INTRAMUSCULAR; INTRAVENOUS
Status: ACTIVE
Start: 2017-05-02 | End: 2017-05-02

## 2017-05-02 RX ORDER — PHENAZOPYRIDINE HYDROCHLORIDE 200 MG/1
200 TABLET, FILM COATED ORAL
COMMUNITY
End: 2017-05-06

## 2017-05-02 RX ORDER — CIPROFLOXACIN 2 MG/ML
400 INJECTION, SOLUTION INTRAVENOUS ONCE
Status: DISCONTINUED | OUTPATIENT
Start: 2017-05-02 | End: 2017-05-02

## 2017-05-02 RX ORDER — ONDANSETRON 4 MG/1
4 TABLET, ORALLY DISINTEGRATING ORAL
Qty: 20 TAB | Refills: 0 | Status: SHIPPED | OUTPATIENT
Start: 2017-05-02 | End: 2017-05-22

## 2017-05-02 RX ORDER — FAMOTIDINE 20 MG/1
20 TABLET, FILM COATED ORAL 2 TIMES DAILY
Status: DISCONTINUED | OUTPATIENT
Start: 2017-05-02 | End: 2017-05-06 | Stop reason: HOSPADM

## 2017-05-02 RX ADMIN — Medication 5 ML: at 13:54

## 2017-05-02 RX ADMIN — ONDANSETRON 4 MG: 2 INJECTION INTRAMUSCULAR; INTRAVENOUS at 02:34

## 2017-05-02 RX ADMIN — MORPHINE SULFATE 4 MG: 4 INJECTION, SOLUTION INTRAMUSCULAR; INTRAVENOUS at 05:45

## 2017-05-02 RX ADMIN — CEFTRIAXONE 1 G: 1 INJECTION, POWDER, FOR SOLUTION INTRAMUSCULAR; INTRAVENOUS at 09:07

## 2017-05-02 RX ADMIN — SODIUM CHLORIDE 100 ML/HR: 900 INJECTION, SOLUTION INTRAVENOUS at 09:03

## 2017-05-02 RX ADMIN — MORPHINE SULFATE 4 MG: 4 INJECTION, SOLUTION INTRAMUSCULAR; INTRAVENOUS at 01:59

## 2017-05-02 RX ADMIN — ONDANSETRON 4 MG: 2 INJECTION INTRAMUSCULAR; INTRAVENOUS at 01:40

## 2017-05-02 RX ADMIN — METRONIDAZOLE 500 MG: 500 INJECTION, SOLUTION INTRAVENOUS at 13:53

## 2017-05-02 RX ADMIN — Medication 5 ML: at 17:12

## 2017-05-02 RX ADMIN — METOCLOPRAMIDE 5 MG: 5 INJECTION, SOLUTION INTRAMUSCULAR; INTRAVENOUS at 13:53

## 2017-05-02 RX ADMIN — RDII 250 MG CAPSULE 250 MG: at 17:09

## 2017-05-02 RX ADMIN — METRONIDAZOLE 500 MG: 500 INJECTION, SOLUTION INTRAVENOUS at 21:52

## 2017-05-02 RX ADMIN — METRONIDAZOLE 500 MG: 500 INJECTION, SOLUTION INTRAVENOUS at 04:13

## 2017-05-02 RX ADMIN — FAMOTIDINE 20 MG: 20 TABLET ORAL at 17:09

## 2017-05-02 RX ADMIN — METOCLOPRAMIDE 5 MG: 5 INJECTION, SOLUTION INTRAMUSCULAR; INTRAVENOUS at 05:45

## 2017-05-02 RX ADMIN — METRONIDAZOLE 500 MG: 500 INJECTION, SOLUTION INTRAVENOUS at 09:03

## 2017-05-02 RX ADMIN — WARFARIN SODIUM 6 MG: 5 TABLET ORAL at 21:51

## 2017-05-02 RX ADMIN — LEVOFLOXACIN 750 MG: 5 INJECTION, SOLUTION INTRAVENOUS at 05:20

## 2017-05-02 RX ADMIN — Medication 5 ML: at 21:53

## 2017-05-02 RX ADMIN — ACETAMINOPHEN 1000 MG: 500 TABLET ORAL at 04:13

## 2017-05-02 NOTE — ED TRIAGE NOTES
Pt arrived to this facility from a nursing home c/o abdominal pain that he states is the \"worst of his life\". Pt states that the pain is centralized in his abdomen. Pt was in the nursing home for rehabilitation for physical therapy. Pt denied nausea but stated that he vomited at the facility earlier. Pt began projectile vomiting upon being moved from the EMS stretcher to this facility's bed. Pt is A & O x 4.

## 2017-05-02 NOTE — H&P
Subjective:     Patient: Ochoa Lockhart MRN: [id number]  SSN: [id number]    YOB: 1945  Age: 66 y.o. Sex: male        HPI: Mr. Oliverio Monk is a 67 yo WM with PMH of LLE DVT s/p thrombolysis and anticoagulated with coumadin 3,2017, L2 compression fracture, neurogenic bladder requiring magana at SNF, recent [id number] [address] evaluated with acute onset of LLQ ABD pain/emesis. Denies rectal bleed/melena/fever/urine complaints. CT AP shows left 10 mm nephrolithiasis without obstruction/diverticulitis/constipation and UA positive for UTI. He has received levaquin/flagyl in ED. ROS positive for weight loss , depression           Past Medical History:   Diagnosis Date    Calculus of kidney     Diabetes ([address]     DVT (deep venous thrombosis) ([address] [address] 2013    s/p IVC filter       Past Surgical History:   Procedure Laterality Date    HX UROLOGICAL      cholecystectomy     (Not in a hospital admission)  Current Facility-Administered Medications   Medication Dose Route Frequency    sodium chloride (NS) flush 5-10 mL  5-10 mL IntraVENous Q8H    sodium chloride (NS) flush 5-10 mL  5-10 mL IntraVENous PRN    metoclopramide HCl (REGLAN) injection 5 mg  5 mg IntraVENous Q6H    metroNIDAZOLE (FLAGYL) IVPB premix 500 mg  500 mg IntraVENous Q8H    cefTRIAXone (ROCEPHIN) 1 g in 0.9% sodium chloride (MBP/ADV) 50 mL  1 g IntraVENous Q24H    0.9% sodium chloride infusion  100 mL/hr IntraVENous CONTINUOUS    insulin lispro (HUMALOG) injection   SubCUTAneous AC&HS     Current Outpatient Prescriptions   Medication Sig    metroNIDAZOLE (FLAGYL) 500 mg tablet Take 1 Tab by mouth three (3) times daily for 10 days.  levoFLOXacin (LEVAQUIN) 750 mg tablet Take 1 Tab by mouth daily for 10 days.  ondansetron (ZOFRAN ODT) 4 mg disintegrating tablet Take 1 Tab by mouth every eight (8) hours as needed for Nausea.     HYDROcodone-acetaminophen (NORCO) 5-325 mg per tablet Take 2 Tabs by mouth every four (4) hours as needed for Pain. Max Daily Amount: 12 Tabs.  warfarin (COUMADIN) 2 mg tablet Take 1 Tab by mouth every seven (7) days.  warfarin (COUMADIN) 4 mg tablet Take 1 Tab by mouth daily. 4 mg Monday - Saturday  2 mg on Sunday  Indications: DEEP VENOUS THROMBOSIS  Indications: DEEP VENOUS THROMBOSIS    colestipol (COLESTID) 1 gram tablet TAKE 2 TABLETS (2 G) BY MOUTH 2 (TWO) TIMES A DAY.  polyethylene glycol (MIRALAX) 17 gram packet Take 17 g by mouth daily.  famotidine (PEPCID) 20 mg tablet Take 20 mg by mouth two (2) times a day.  Saccharomyces boulardii (FLORASTOR) 250 mg capsule Take 250 mg by mouth two (2) times a day.  tamsulosin (FLOMAX) 0.4 mg capsule Take 0.4 mg by mouth daily.  cyclobenzaprine (FLEXERIL) 5 mg tablet Take 5 mg by mouth three (3) times daily as needed for Muscle Spasm(s).  glucose blood VI test strips (ACCU-CHEK [place] PLUS TEST STRP) strip Check BS Once Daily  Dx E11.9    potassium citrate (UROCIT-K10) 10 mEq (1,080 mg) TbER Take 1 Tab by mouth two (2) times a day.  olmesartan (BENICAR) 40 mg tablet Take 1 Tab by mouth daily.  metFORMIN (GLUCOPHAGE) 500 mg tablet Take 1 Tab by mouth two (2) times daily (with meals).  Lancets (ACCU-CHEK FASTCLIX) misc Check BS Once Daily  DX E11.9    zolpidem (AMBIEN) 5 mg tablet Take 1 Tab by mouth nightly as needed for Sleep. Max Daily Amount: 5 mg.      Allergies   Allergen Reactions    Sulfite Hives      Social History   Substance Use Topics    Smoking status: Former Smoker    Smokeless tobacco: Not on file    Alcohol use No      Family History   Problem Relation Age of Onset    Cancer Brother      lung cancer    Deep Vein Thrombosis Other      father    Heart Disease Other      mother        Review of Systems  Complete review of systems was obtained and is otherwise negative unless stated in the HPI       I have reviewed all the pertinent medical and surgical history, social history, allergies, family history,  as well as pertinent labs and studies . Objective:     Patient Vitals for the past 24 hrs:   BP Temp Pulse Resp SpO2 Height Weight   05/02/17 0730 135/62 - 88 - 92 % - -   05/02/17 0653 167/71 - 91 17 92 % - -   05/02/17 0547 163/75 99.4 °F (37.4 °C) 94 18 - - -   05/02/17 0132 162/77 99 °F (37.2 °C) 88 16 96 % 5' 6\" (1.676 m) 126.1 kg (278 lb)             Exam:  General: well developed, well nourished, no distress, alert  Eyes: PERRLA  HEENT: oral cavity clear,mallampatti III, nasal septum midline  Neck: supple, symmetrical, trachea midline, no adenopathy,no carotid bruit and no JVD  Lungs: clear to auscultation bilaterally, good effort   Heart: regular rate and rhythm, S1, S2 normal, III/VI JEROMY LUSB, click, rub or gallop, no edema   Abdomen: soft,LLQ tenderness without rebound or guarding. Bowel sounds normal. No masses,  no organomegaly  Extremities: extremities normal, atraumatic, no cyanosis or edema  Skin: Skin color, texture, turgor normal. No rashes or lesions  Neurologic: Alert and oriented X 3, normal strength and tone. Musculoskeletal: no gross deficits   Psychiatric: appropriate mood and affect    ECG:     Data Review (Labs):   Recent Results (from the past 24 hour(s))   CBC WITH AUTOMATED DIFF    Collection Time: 05/02/17  2:05 AM   Result Value Ref Range    WBC 12.6 (H) 4.3 - 11.1 K/uL    RBC 4.67 4.23 - 5.67 M/uL    HGB 14.2 13.6 - 17.2 g/dL    HCT 43.8 41.1 - 50.3 %    MCV 93.8 79.6 - 97.8 FL    MCH 30.4 26.1 - 32.9 PG    MCHC 32.4 31.4 - 35.0 g/dL    RDW 16.3 (H) 11.9 - 14.6 %    PLATELET 067 754 - 093 K/uL    MPV 10.0 (L) 10.8 - 14.1 FL    DF AUTOMATED      NEUTROPHILS 86 (H) 43 - 78 %    LYMPHOCYTES 7 (L) 13 - 44 %    MONOCYTES 6 4.0 - 12.0 %    EOSINOPHILS 1 0.5 - 7.8 %    BASOPHILS 0 0.0 - 2.0 %    IMMATURE GRANULOCYTES 0.3 0.0 - 5.0 %    ABS. NEUTROPHILS 10.8 (H) 1.7 - 8.2 K/UL    ABS. LYMPHOCYTES 0.9 0.5 - 4.6 K/UL    ABS. MONOCYTES 0.7 0.1 - 1.3 K/UL    ABS. EOSINOPHILS 0.1 0.0 - 0.8 K/UL    ABS.  BASOPHILS 0.0 0.0 - 0.2 K/UL    ABS. IMM. GRANS. 0.0 0.0 - 0.5 K/UL   METABOLIC PANEL, COMPREHENSIVE    Collection Time: 05/02/17  2:05 AM   Result Value Ref Range    Sodium 145 136 - 145 mmol/L    Potassium 4.3 3.5 - 5.1 mmol/L    Chloride 105 98 - 107 mmol/L    CO2 31 21 - 32 mmol/L    Anion gap 9 7 - 16 mmol/L    Glucose 104 (H) 65 - 100 mg/dL    BUN 14 8 - 23 MG/DL    Creatinine 1.04 0.8 - 1.5 MG/DL    GFR est AA >60 >60 ml/min/1.73m2    GFR est non-AA >60 >60 ml/min/1.73m2    Calcium 9.4 8.3 - 10.4 MG/DL    Bilirubin, total 0.5 0.2 - 1.1 MG/DL    ALT (SGPT) 33 12 - 65 U/L    AST (SGOT) 35 15 - 37 U/L    Alk. phosphatase 102 50 - 136 U/L    Protein, total 7.4 6.3 - 8.2 g/dL    Albumin 3.0 (L) 3.2 - 4.6 g/dL    Globulin 4.4 (H) 2.3 - 3.5 g/dL    A-G Ratio 0.7 (L) 1.2 - 3.5     LIPASE    Collection Time: 05/02/17  2:05 AM   Result Value Ref Range    Lipase 249 73 - 393 U/L   PROTHROMBIN TIME + INR    Collection Time: 05/02/17  2:05 AM   Result Value Ref Range    Prothrombin time 20.5 (H) 9.6 - 12.0 sec    INR 1.9 (H) 0.9 - 1.2     URINALYSIS W/ RFLX MICROSCOPIC    Collection Time: 05/02/17  2:40 AM   Result Value Ref Range    Color YELLOW      Appearance CLOUDY      Specific gravity 1.022 1.001 - 1.023      pH (UA) 6.0 5.0 - 9.0      Protein 100 (A) NEG mg/dL    Glucose NEGATIVE  mg/dL    Ketone TRACE (A) NEG mg/dL    Bilirubin NEGATIVE  NEG      Blood LARGE (A) NEG      Urobilinogen 0.2 0.2 - 1.0 EU/dL    Nitrites NEGATIVE  NEG      Leukocyte Esterase SMALL (A) NEG      WBC 20-50 0 /hpf    RBC >100 0 /hpf    Epithelial cells 0-3 0 /hpf    Bacteria 0 0 /hpf    Other observations RESULTS VERIFIED MANUALLY     CULTURE, URINE    Collection Time: 05/02/17  2:40 AM   Result Value Ref Range    Special Requests: NO SPECIAL REQUESTS      Culture result:        NO GROWTH AFTER SHORT PERIOD OF INCUBATION. FURTHER RESULTS TO FOLLOW AFTER OVERNIGHT INCUBATION. Assessment / Plan:    Active Problems:    Type 2 diabetes mellitus without complication ([facility] (2/11/1438)      Obstructive sleep apnea syndrome (7/22/2016)      Overview: Home CPAP      Morbid obesity due to excess calories ([address] (7/22/2016)      Benign essential HTN (9/13/2016)      Catheter-associated urinary tract infection ([address] (5/2/2017)      Neurogenic bladder (5/2/2017)      Diverticulitis (5/2/2017)      Chronic anticoagulation (5/2/2017)      DVT (deep venous thrombosis) ([address] (5/2/2017)      Nephrolithiasis (5/2/2017)        -admit to medical bed   -rocephin and flagyl to cover UTI and diverticulitis, last UA showed sensitive ECOLI, follow urine cx  -pharm consult for ongoing coumadin for known recent DVT  -change magana cath  -gentle hydration  -continue home meds  -SW for return to rehab once stable   DVT Prophylaxis:coumadin  FEN:oral,IVF  Code Status: FULL  PMD: [facility] addressed:wife Nunu Taylor, [phone]  EST LOS 3 days  Juan Kendall MD    Signed By: Juan Kendall MD     May 2, 2017

## 2017-05-02 NOTE — ED NOTES
Spoke with pt's wife, Silvana Mayorga ([phone]) per pt to let her know pt is being admitted to the hospital. All questions were answered.

## 2017-05-02 NOTE — Clinical Note
Stop ciprofloxacin and start Levaquin and Flagyl. Return if vomiting continues and you're unable to tolerate oral antibiotics. Return for any other concerning or worsening symptoms.

## 2017-05-02 NOTE — ED NOTES
Pt is being taken to CT at this moment in time. Will collect urine upon his return to this department.

## 2017-05-02 NOTE — ED NOTES
TRANSFER - OUT REPORT:    Verbal report given to Sosa Rossi RN on Abril Limb  being transferred to 8th floor for routine progression of care       Report consisted of patients Situation, Background, Assessment and   Recommendations(SBAR). Information from the following report(s) SBAR, ED Summary and MAR was reviewed with the receiving nurse. Lines:   Peripheral IV 05/02/17 Left Antecubital (Active)   Site Assessment Clean, dry, & intact 5/2/2017  5:37 AM   Phlebitis Assessment 0 5/2/2017  5:37 AM   Infiltration Assessment 0 5/2/2017  5:37 AM   Dressing Status Clean, dry, & intact 5/2/2017  5:37 AM   Dressing Type 4 X 4 5/2/2017  5:37 AM       Peripheral IV 05/02/17 Right Antecubital (Active)   Site Assessment Clean, dry, & intact 5/2/2017  9:06 AM   Phlebitis Assessment 0 5/2/2017  9:06 AM   Infiltration Assessment 0 5/2/2017  9:06 AM   Dressing Status Clean, dry, & intact 5/2/2017  9:06 AM   Dressing Type Transparent;Tape 5/2/2017  9:06 AM   Hub Color/Line Status Positional;Patent 5/2/2017  9:06 AM        Opportunity for questions and clarification was provided.       Patient transported with:

## 2017-05-02 NOTE — IP AVS SNAPSHOT
Current Discharge Medication List  
  
START taking these medications Dose & Instructions Dispensing Information Comments Morning Noon Evening Bedtime  
 levoFLOXacin 750 mg tablet Commonly known as:  Ger Torres Your last dose was: Your next dose is:    
   
   
 Dose:  750 mg Take 1 Tab by mouth daily for 10 days. Quantity:  10 Tab Refills:  0  
     
   
   
   
  
 * metroNIDAZOLE 500 mg tablet Commonly known as:  FLAGYL Your last dose was: Your next dose is:    
   
   
 Dose:  500 mg Take 1 Tab by mouth three (3) times daily for 10 days. Quantity:  30 Tab Refills:  0  
     
   
   
   
  
 * metroNIDAZOLE 500 mg tablet Commonly known as:  FLAGYL Your last dose was: Your next dose is:    
   
   
 Dose:  500 mg Take 1 Tab by mouth three (3) times daily. Quantity:  18 Tab Refills:  0  
     
   
   
   
  
 ondansetron 4 mg disintegrating tablet Commonly known as:  ZOFRAN ODT Your last dose was: Your next dose is:    
   
   
 Dose:  4 mg Take 1 Tab by mouth every eight (8) hours as needed for Nausea. Quantity:  20 Tab Refills:  0  
     
   
   
   
  
 * Notice: This list has 2 medication(s) that are the same as other medications prescribed for you. Read the directions carefully, and ask your doctor or other care provider to review them with you. CONTINUE these medications which have CHANGED Dose & Instructions Dispensing Information Comments Morning Noon Evening Bedtime * famotidine 20 mg tablet Commonly known as:  PEPCID What changed:  Another medication with the same name was added. Make sure you understand how and when to take each. Your last dose was: Your next dose is:    
   
   
 Dose:  20 mg Take 20 mg by mouth daily. Refills:  0  
     
   
   
   
  
 * famotidine 20 mg tablet Commonly known as:  PEPCID  
 What changed: You were already taking a medication with the same name, and this prescription was added. Make sure you understand how and when to take each. Your last dose was: Your next dose is:    
   
   
 Dose:  20 mg Take 1 Tab by mouth two (2) times a day. Quantity:  60 Tab Refills:  0  
     
   
   
   
  
 * warfarin 6 mg tablet Commonly known as:  COUMADIN What changed:  Another medication with the same name was added. Make sure you understand how and when to take each. Your last dose was: Your next dose is:    
   
   
 Dose:  6 mg Take 6 mg by mouth daily. Refills:  0  
     
   
   
   
  
 * warfarin 6 mg tablet Commonly known as:  COUMADIN What changed: You were already taking a medication with the same name, and this prescription was added. Make sure you understand how and when to take each. Your last dose was: Your next dose is:    
   
   
 Dose:  6 mg Take 1 Tab by mouth nightly. Quantity:  30 Tab Refills:  0  
     
   
   
   
  
 * Notice: This list has 4 medication(s) that are the same as other medications prescribed for you. Read the directions carefully, and ask your doctor or other care provider to review them with you. CONTINUE these medications which have NOT CHANGED Dose & Instructions Dispensing Information Comments Morning Noon Evening Bedtime  
 ciprofloxacin HCl 500 mg tablet Commonly known as:  CIPRO Your last dose was: Your next dose is:    
   
   
 Dose:  500 mg Take 1 Tab by mouth two (2) times a day for 6 days. Quantity:  12 Tab Refills:  0  
     
   
   
   
  
 ergocalciferol 50,000 unit capsule Commonly known as:  ERGOCALCIFEROL Your last dose was: Your next dose is:    
   
   
 Dose:  06450 Units Take 50,000 Units by mouth every seven (7) days. Refills:  0  
     
   
   
   
  
 ferrous sulfate 325 mg (65 mg iron) tablet Your last dose was: Your next dose is: Take  by mouth Daily (before breakfast). Refills:  0  
     
   
   
   
  
 glucose blood VI test strips strip Commonly known as:  ACCU-CHEK [place] PLUS TEST STRP Your last dose was: Your next dose is:    
   
   
 Check BS Once Daily  Dx E11.9 Quantity:  50 Strip Refills:  4  
     
   
   
   
  
 lactulose 10 gram/15 mL solution Commonly known as:  Enolia Bodo Your last dose was: Your next dose is:    
   
   
 Dose:  20 g Take 20 g by mouth two (2) times a day. Refills:  0 Lancets Misc Commonly known as:  ACCU-CHEK FASTCLIX Your last dose was: Your next dose is:    
   
   
 Check BS Once Daily  DX E11.9 Quantity:  100 Each Refills:  1 LEXAPRO 10 mg tablet Generic drug:  escitalopram oxalate Your last dose was: Your next dose is:    
   
   
 Dose:  10 mg Take 10 mg by mouth daily. Refills:  0  
     
   
   
   
  
 losartan 100 mg tablet Commonly known as:  COZAAR Your last dose was: Your next dose is:    
   
   
 Dose:  100 mg Take 100 mg by mouth daily. Refills:  0  
     
   
   
   
  
 multivitamin tablet Commonly known as:  ONE A DAY Your last dose was: Your next dose is:    
   
   
 Dose:  1 Tab Take 1 Tab by mouth daily. Refills:  0 Saccharomyces boulardii 250 mg capsule Commonly known as:  Gary Controls Your last dose was: Your next dose is:    
   
   
 Dose:  250 mg Take 1 Cap by mouth two (2) times a day for 7 days. Quantity:  14 Cap Refills:  0  
     
   
   
   
  
 tamsulosin 0.4 mg capsule Commonly known as:  FLOMAX Your last dose was: Your next dose is:    
   
   
 Dose:  0.4 mg Take 1 Cap by mouth daily. Quantity:  30 Cap Refills:  0  
     
   
   
   
  
 VITAMIN B-12 1,000 mcg tablet Generic drug:  cyanocobalamin Your last dose was: Your next dose is:    
   
   
 Dose:  1000 mcg Take 1,000 mcg by mouth daily. Refills:  0 STOP taking these medications   
 phenazopyridine 200 mg tablet Commonly known as:  PYRIDIUM Where to Get Your Medications Information on where to get these meds will be given to you by the nurse or doctor. ! Ask your nurse or doctor about these medications  
  ciprofloxacin HCl 500 mg tablet  
 famotidine 20 mg tablet  
 levoFLOXacin 750 mg tablet  
 metroNIDAZOLE 500 mg tablet  
 metroNIDAZOLE 500 mg tablet  
 ondansetron 4 mg disintegrating tablet Saccharomyces boulardii 250 mg capsule  
 tamsulosin 0.4 mg capsule  
 warfarin 6 mg tablet

## 2017-05-02 NOTE — PROGRESS NOTES
Admission database completed. PTA med list updated per medication list from nursing home. Dr. Prashanth Porter aware, orders received.

## 2017-05-02 NOTE — ED NOTES
Pt currently lying on his left side in no acute distress at this moment in time. Respirations even and unlabored 20. Will continue to closely monitor and assess.

## 2017-05-02 NOTE — IP AVS SNAPSHOT
[address] 
[phone] Patient: Freedom Whitehead MRN: [id number] QML:[id number] You are allergic to the following Allergen Reactions Sulfite Hives Immunizations Administered for This Admission Name Date  
 TB Skin Test (PPD) Intradermal 5/4/2017 Recent Documentation Height Weight BMI Smoking Status 1.676 m 126.1 kg 44.87 kg/m2 Former Smoker Unresulted Labs Order Current Status CULTURE, BLOOD Preliminary result CULTURE, BLOOD Preliminary result Emergency Contacts Name Discharge Info Relation Home Work Mobile Emili Gomez  Spouse [3] [phone] Charles Washburn  Daughter [21] [phone] About your hospitalization You were admitted on:  May 2, 2017 You last received care in the:  [facility] You were discharged on:  May 6, 2017 Unit phone number:  [phone] Why you were hospitalized Your primary diagnosis was:  Catheter-Associated Urinary Tract Infection (Hcc) Your diagnoses also included:  Neurogenic Bladder, Diverticulitis, Benign Essential Htn, Morbid Obesity Due To Excess Calories (Hcc), Obstructive Sleep Apnea Syndrome, Type 2 Diabetes Mellitus Without Complication (Hcc), Chronic Anticoagulation, Dvt (Deep Venous Thrombosis) (Hcc), Nephrolithiasis Providers Seen During Your Hospitalizations Provider Role Specialty Primary office phone Mili Lackey MD Attending Provider Emergency Medicine [phone] Antionette Myers MD Attending Provider Internal Medicine [phone] Your Primary Care Physician (PCP) Primary Care Physician Office Phone Office Fax [address] [facility] [phone] Follow-up Information Follow up With Details Comments Contact Info Patrice Looney MD Schedule an appointment as soon as possible for a visit in 1 week call on Monday for follow up appointment with Aretha: now on Coumadin. Post hospital admission [facility] Internal Medicine [address] [place] [address] 
[phone] Ct Yo MD Schedule an appointment as soon as possible for a visit in 3 days call on Monday for follow up apointment in 3 to 5 days: post hospital admission UTI [address] [address] 
[phone] Current Discharge Medication List  
  
START taking these medications Dose & Instructions Dispensing Information Comments Morning Noon Evening Bedtime  
 levoFLOXacin 750 mg tablet Commonly known as:  Brayan Alfaro Your last dose was: Your next dose is:    
   
   
 Dose:  750 mg Take 1 Tab by mouth daily for 10 days. Quantity:  10 Tab Refills:  0  
     
   
   
   
  
 * metroNIDAZOLE 500 mg tablet Commonly known as:  FLAGYL Your last dose was: Your next dose is:    
   
   
 Dose:  500 mg Take 1 Tab by mouth three (3) times daily for 10 days. Quantity:  30 Tab Refills:  0  
     
   
   
   
  
 * metroNIDAZOLE 500 mg tablet Commonly known as:  FLAGYL Your last dose was: Your next dose is:    
   
   
 Dose:  500 mg Take 1 Tab by mouth three (3) times daily. Quantity:  18 Tab Refills:  0  
     
   
   
   
  
 ondansetron 4 mg disintegrating tablet Commonly known as:  ZOFRAN ODT Your last dose was: Your next dose is:    
   
   
 Dose:  4 mg Take 1 Tab by mouth every eight (8) hours as needed for Nausea. Quantity:  20 Tab Refills:  0  
     
   
   
   
  
 * Notice: This list has 2 medication(s) that are the same as other medications prescribed for you. Read the directions carefully, and ask your doctor or other care provider to review them with you. CONTINUE these medications which have CHANGED Dose & Instructions Dispensing Information Comments Morning Noon Evening Bedtime * famotidine 20 mg tablet Commonly known as:  PEPCID What changed:  Another medication with the same name was added. Make sure you understand how and when to take each. Your last dose was: Your next dose is:    
   
   
 Dose:  20 mg Take 20 mg by mouth daily. Refills:  0  
     
   
   
   
  
 * famotidine 20 mg tablet Commonly known as:  PEPCID What changed: You were already taking a medication with the same name, and this prescription was added. Make sure you understand how and when to take each. Your last dose was: Your next dose is:    
   
   
 Dose:  20 mg Take 1 Tab by mouth two (2) times a day. Quantity:  60 Tab Refills:  0  
     
   
   
   
  
 * warfarin 6 mg tablet Commonly known as:  COUMADIN What changed:  Another medication with the same name was added. Make sure you understand how and when to take each. Your last dose was: Your next dose is:    
   
   
 Dose:  6 mg Take 6 mg by mouth daily. Refills:  0  
     
   
   
   
  
 * warfarin 6 mg tablet Commonly known as:  COUMADIN What changed: You were already taking a medication with the same name, and this prescription was added. Make sure you understand how and when to take each. Your last dose was: Your next dose is:    
   
   
 Dose:  6 mg Take 1 Tab by mouth nightly. Quantity:  30 Tab Refills:  0  
     
   
   
   
  
 * Notice: This list has 4 medication(s) that are the same as other medications prescribed for you. Read the directions carefully, and ask your doctor or other care provider to review them with you. CONTINUE these medications which have NOT CHANGED Dose & Instructions Dispensing Information Comments Morning Noon Evening Bedtime  
 ciprofloxacin HCl 500 mg tablet Commonly known as:  CIPRO Your last dose was: Your next dose is:    
   
   
 Dose:  500 mg Take 1 Tab by mouth two (2) times a day for 6 days. Quantity:  12 Tab Refills:  0  
     
   
   
   
  
 ergocalciferol 50,000 unit capsule Commonly known as:  ERGOCALCIFEROL Your last dose was: Your next dose is:    
   
   
 Dose:  86123 Units Take 50,000 Units by mouth every seven (7) days. Refills:  0  
     
   
   
   
  
 ferrous sulfate 325 mg (65 mg iron) tablet Your last dose was: Your next dose is: Take  by mouth Daily (before breakfast). Refills:  0  
     
   
   
   
  
 glucose blood VI test strips strip Commonly known as:  ACCU-CHEK [place]ENIX PLUS TEST STRP Your last dose was: Your next dose is:    
   
   
 Check BS Once Daily  Dx E11.9 Quantity:  50 Strip Refills:  4  
     
   
   
   
  
 lactulose 10 gram/15 mL solution Commonly known as:  Alonso Bone Your last dose was: Your next dose is:    
   
   
 Dose:  20 g Take 20 g by mouth two (2) times a day. Refills:  0 Lancets Misc Commonly known as:  ACCU-CHEK FASTCLIX Your last dose was: Your next dose is:    
   
   
 Check BS Once Daily  DX E11.9 Quantity:  100 Each Refills:  1 LEXAPRO 10 mg tablet Generic drug:  escitalopram oxalate Your last dose was: Your next dose is:    
   
   
 Dose:  10 mg Take 10 mg by mouth daily. Refills:  0  
     
   
   
   
  
 losartan 100 mg tablet Commonly known as:  COZAAR Your last dose was: Your next dose is:    
   
   
 Dose:  100 mg Take 100 mg by mouth daily. Refills:  0  
     
   
   
   
  
 multivitamin tablet Commonly known as:  ONE A DAY Your last dose was: Your next dose is:    
   
   
 Dose:  1 Tab Take 1 Tab by mouth daily. Refills:  0 Saccharomyces boulardii 250 mg capsule Commonly known as:  Gary Mckeon Your last dose was: Your next dose is:    
   
   
 Dose:  250 mg Take 1 Cap by mouth two (2) times a day for 7 days. Quantity:  14 Cap Refills:  0  
     
   
   
   
  
 tamsulosin 0.4 mg capsule Commonly known as:  FLOMAX Your last dose was: Your next dose is:    
   
   
 Dose:  0.4 mg Take 1 Cap by mouth daily. Quantity:  30 Cap Refills:  0  
     
   
   
   
  
 VITAMIN B-12 1,000 mcg tablet Generic drug:  cyanocobalamin Your last dose was: Your next dose is:    
   
   
 Dose:  1000 mcg Take 1,000 mcg by mouth daily. Refills:  0 STOP taking these medications   
 phenazopyridine 200 mg tablet Commonly known as:  PYRIDIUM Where to Get Your Medications Information on where to get these meds will be given to you by the nurse or doctor. ! Ask your nurse or doctor about these medications  
  ciprofloxacin HCl 500 mg tablet  
 famotidine 20 mg tablet  
 levoFLOXacin 750 mg tablet  
 metroNIDAZOLE 500 mg tablet  
 metroNIDAZOLE 500 mg tablet  
 ondansetron 4 mg disintegrating tablet Saccharomyces boulardii 250 mg capsule  
 tamsulosin 0.4 mg capsule  
 warfarin 6 mg tablet Discharge Instructions DISCHARGE SUMMARY from Nurse The following personal items are in your possession at time of discharge: 
 
Dental Appliances: None Visual Aid: None Home Medications: None Jewelry: None Clothing: None Other Valuables: None PATIENT INSTRUCTIONS: 
 
 
F-face looks uneven A-arms unable to move or move unevenly S-speech slurred or non-existent T-time-call 911 as soon as signs and symptoms begin-DO NOT go Back to bed or wait to see if you get better-TIME IS BRAIN. Warning Signs of HEART ATTACK Call 911 if you have these symptoms: 
? Chest discomfort. Most heart attacks involve discomfort in the center of the chest that lasts more than a few minutes, or that goes away and comes back. It can feel like uncomfortable pressure, squeezing, fullness, or pain. ? Discomfort in other areas of the upper body. Symptoms can include pain or discomfort in one or both arms, the back, neck, jaw, or stomach. ? Shortness of breath with or without chest discomfort. ? Other signs may include breaking out in a cold sweat, nausea, or lightheadedness. Don't wait more than five minutes to call [address]! Fast action can save your life. Calling 911 is almost always the fastest way to get lifesaving treatment. Emergency Medical Services staff can begin treatment when they arrive  up to an hour sooner than if someone gets to the hospital by car. The discharge information has been reviewed with the patient and spouse. The patient and spouse verbalized understanding. Discharge medications reviewed with the patient and spouse and appropriate educational materials and side effects teaching were provided. Please schedule appointment with MD in 3 to 5 days at discharge, sepsis book and thermometer for home use. IF UNABLE PLEASE ADVISE SEPSIS NAVIGATOR OR  Diverticulitis: Care Instructions Your Care Instructions Diverticulitis occurs when pouches form in the wall of the colon and become inflamed or infected. It can be very painful. Doctors aren't sure what causes diverticulitis. There is no proof that foods such as nuts, seeds, or berries cause it or make it worse. A low-fiber diet may cause the colon to work harder to push stool forward. Pouches may form because of this extra work. It may be hard to think about healthy eating while you're in pain. But as you recover, you might think about how you can use healthy eating for overall better health. Healthy eating may help you avoid future attacks. Follow-up care is a key part of your treatment and safety. Be sure to make and go to all appointments, and call your doctor if you are having problems. It's also a good idea to know your test results and keep a list of the medicines you take. How can you care for yourself at home? · Drink plenty of fluids, enough so that your urine is light yellow or clear like water. If you have kidney, heart, or liver disease and have to limit fluids, talk with your doctor before you increase the amount of fluids you drink. · Stick to liquids or a bland diet (plain rice, bananas, dry toast or crackers, applesauce) until you are feeling better. Then you can return to regular foods and gradually increase the amount of fiber in your diet. · Use a heating pad set on low on your belly to relieve mild cramps and pain. · Get extra rest until you are feeling better. · Be safe with medicines. Read and follow all instructions on the label. ¨ If the doctor gave you a prescription medicine for pain, take it as prescribed. ¨ If you are not taking a prescription pain medicine, ask your doctor if you can take an over-the-counter medicine. · If your doctor prescribed antibiotics, take them as directed. Do not stop taking them just because you feel better. You need to take the full course of antibiotics. To prevent future attacks of diverticulitis · Avoid constipation: 
¨ Include fruits, vegetables, beans, and whole grains in your diet each day. These foods are high in fiber. ¨ Drink plenty of fluids, enough so that your urine is light yellow or clear like water.  If you have kidney, heart, or liver disease and have to limit fluids, talk with your doctor before you increase the amount of fluids you drink. ¨ Get some exercise every day. Build up slowly to 30 to 60 minutes a day on 5 or more days of the week. ¨ Take a fiber supplement, such as Citrucel or Metamucil, every day if needed. Read and follow all instructions on the label. ¨ Schedule time each day for a bowel movement. Having a daily routine may help. Take your time and do not strain when having a bowel movement. When should you call for help? Call 911 anytime you think you may need emergency care. For example, call if: 
· You passed out (lost consciousness). · You vomit blood or what looks like coffee grounds. · You pass maroon or very bloody stools. Call your doctor now or seek immediate medical care if: 
· You have severe pain or swelling in your belly. · You have a new or higher fever. · You cannot keep down fluids or medicines. · You have new pain that gets worse when you move or cough. Watch closely for changes in your health, and be sure to contact your doctor if: · The symptoms you had when you first started feeling sick come back. · You do not get better as expected. Where can you learn more? Go to http://yelitza-julia.info/. Enter H901 in the search box to learn more about \"Diverticulitis: Care Instructions. \" Current as of: August 9, 2016 Content Version: 11.2 © 3557-6708 [employer]. Care instructions adapted under license by [employer] (which disclaims liability or warranty for this information). If you have questions about a medical condition or this instruction, always ask your healthcare professional. [address] [address] any warranty or liability for your use of this information. Discharge Orders None ACO Transitions of Care Introducing [employer] offers a voluntary care coordination program to provide high quality service and care to [facility] fee-for-service beneficiaries. Gisella Moore was designed to help you enhance your health and well-being through the following services: ? Transitions of Care  support for individuals who are transitioning from one care setting to another (example: Hospital to home). ? Chronic and Complex Care Coordination  support for individuals and caregivers of those with serious or chronic illnesses or with more than one chronic (ongoing) condition and those who take a number of different medications. If you meet specific medical criteria, a [facility] [address] may call you directly to coordinate your care with your primary care physician and your other care providers. For questions about the [facility] programs, please, contact your physicians office. For general questions or additional information about Accountable Care Organizations: 
Please visit www.medicare.gov/acos. html or call 1-800-MEDICARE (0-333.112.9428) TTY users should call 9-651.616.4553. Chilltime Announcement We are excited to announce that we are making your provider's discharge notes available to you in Chilltime. You will see these notes when they are completed and signed by the physician that discharged you from your recent hospital stay. If you have any questions or concerns about any information you see in Chilltime, please call the Health Information Department where you were seen or reach out to your Primary Care Provider for more information about your plan of care. Introducing [facility]! King Estevan introduces Chilltime patient portal. Now you can access parts of your medical record, email your doctor's office, and request medication refills online. 1. In your internet browser, go to [email or web address] 2. Click on the First Time User? Click Here link in the Sign In box. You will see the New Member Sign Up page. 3. Enter your [employer] Access Code exactly as it appears below. You will not need to use this code after youve completed the sign-up process. If you do not sign up before the expiration date, you must request a new code. · [employer] Access Code: [email or web address] Expires: 6/19/2017  1:08 PM 
 
4. Enter the last four digits of your Social Security Number (xxxx) and Date of Birth (mm/dd/yyyy) as indicated and click Submit. You will be taken to the next sign-up page. 5. Create a [employer] Perch ID. This will be your [employer] login ID and cannot be changed, so think of one that is secure and easy to remember. 6. Create a [employer] password. You can change your password at any time. 7. Enter your Password Reset Question and Answer. This can be used at a later time if you forget your password. 8. Enter your e-mail address. You will receive e-mail notification when new information is available in [address]. 9. Click Sign Up. You can now view and download portions of your medical record. 10. Click the Download Summary menu link to download a portable copy of your medical information. If you have questions, please visit the Frequently Asked Questions section of the [employer] website. Remember, [employer] is NOT to be used for urgent needs. For medical emergencies, dial 911. Now available from your iPhone and Android! General Information Please provide this summary of care documentation to your next provider. Patient Signature:  ____________________________________________________________ Date:  ____________________________________________________________  
  
Saritha Nichols Provider Signature:  ____________________________________________________________ Date:  ____________________________________________________________

## 2017-05-02 NOTE — ED PROVIDER NOTES
HPI Comments: 66-year-old male present by EMS from rehab facility for abdominal pain that started about one hour prior to arrival.  States pain radiates from his left back to his groin. It feels similar to prior kidney stones. He has vomited once. He denies fever. Had a normal BM yesterday without blood. He had a magana cathter placed about a week and a half ago for retention due to \"nerve damage in his back. \"  He was started on antibiotics 3 days ago for urinary tract infection. He denies fevers or chills. He's had a complicated recent history. He was admitted to the hospital in Feb for fall causing spinal fractures. Admission was complicated by renal failure requiring dialysis and extensive bilateral DVTs requiring catheter directed TPA. Currently on coumadin. Patient is a 66 y.o. male presenting with abdominal pain. The history is provided by the patient. Abdominal Pain    Associated symptoms include nausea, vomiting and back pain. Pertinent negatives include no fever, no diarrhea, no headaches and no chest pain. Past Medical History:   Diagnosis Date    Calculus of kidney     Diabetes ([address]     DVT (deep venous thrombosis) ([address] 2013    s/p IVC filter        Past Surgical History:   Procedure Laterality Date    HX UROLOGICAL           Family History:   Problem Relation Age of Onset    Cancer Brother      lung cancer    Deep Vein Thrombosis Other      father    Heart Disease Other      mother       Social History     Social History    Marital status:      Spouse name: N/A    Number of children: N/A    Years of education: N/A     Occupational History    Not on file.      Social History Main Topics    Smoking status: Former Smoker    Smokeless tobacco: Not on file    Alcohol use 0.0 oz/week     0 Standard drinks or equivalent per week    Drug use: No    Sexual activity: Not on file     Other Topics Concern    Not on file     Social History Narrative         ALLERGIES: Sulfite    Review of Systems   Constitutional: Positive for fatigue. Negative for chills and fever. HENT: Negative for hearing loss. Eyes: Negative for visual disturbance. Respiratory: Negative for cough and shortness of breath. Cardiovascular: Negative for chest pain and palpitations. Gastrointestinal: Positive for abdominal pain, nausea and vomiting. Negative for blood in stool and diarrhea. Genitourinary: Positive for difficulty urinating. Musculoskeletal: Positive for back pain. Skin: Negative for rash. Neurological: Negative for weakness and headaches. Psychiatric/Behavioral: Negative for confusion. Vitals:    05/02/17 0132   BP: 162/77   Pulse: 88   Resp: 16   Temp: 99 °F (37.2 °C)   SpO2: 96%   Weight: 126.1 kg (278 lb)   Height: 5' 6\" (1.676 m)            Physical Exam   Constitutional: He appears well-developed and well-nourished. He appears ill. HENT:   Head: Normocephalic and atraumatic. Right Ear: External ear normal.   Left Ear: External ear normal.   Nose: Nose normal.   Mouth/Throat: Oropharynx is clear and moist.   Eyes: Conjunctivae are normal. Pupils are equal, round, and reactive to light. Neck: Normal range of motion. Neck supple. Cardiovascular: Regular rhythm, normal heart sounds and intact distal pulses. Pulmonary/Chest: Effort normal and breath sounds normal. No respiratory distress. He has no wheezes. Abdominal: Soft. He exhibits no distension. Bowel sounds are increased. There is tenderness in the suprapubic area, left upper quadrant and left lower quadrant. There is guarding. There is no rigidity and no rebound. Genitourinary:         Musculoskeletal: Normal range of motion. He exhibits no edema. Neurological: He is alert. Skin: Skin is warm and dry. There is pallor. Psychiatric: Judgment normal.   Nursing note and vitals reviewed.        MDM  Number of Diagnoses or Management Options  Diverticulitis of intestine without perforation or abscess without bleeding, unspecified part of intestinal tract:   Diagnosis management comments: Parts of this document were created using dragon voice recognition software. The chart has been reviewed but errors may still be present. 4:12 AM  Patient has been on Cipro for urinary tract infection for 4 days. CT consistent with diverticulitis with no obstruction. No hydronephrosis from left-sided kidney stone or ureteral stone. Vomiting has resolved. Offered admission, but will try discharge. We'll monitor in emergency department overnight while he receives IV antibiotics. 7:03 AM  Patient has had increased pain and has decided he would prefer admission.   Discussed with hospitalist.       Amount and/or Complexity of Data Reviewed  Clinical lab tests: ordered and reviewed (Results for orders placed or performed during the hospital encounter of 05/02/17  -CBC WITH AUTOMATED DIFF       Result                                            Value                         Ref Range                       WBC                                               12.6 (H)                      4.3 - 11.1 K/uL                 RBC                                               4.67                          4.23 - 5.67 M/uL                HGB                                               14.2                          13.6 - 17.2 g/dL                HCT                                               43.8                          41.1 - 50.3 %                   MCV                                               93.8                          79.6 - 97.8 FL                  MCH                                               30.4                          26.1 - 32.9 PG                  MCHC                                              32.4                          31.4 - 35.0 g/dL                RDW                                               16.3 (H)                      11.9 - 14.6 %                   PLATELET 222                           150 - 450 K/uL                  MPV                                               10.0 (L)                      10.8 - 14.1 FL                  DF                                                AUTOMATED                                                     NEUTROPHILS                                       86 (H)                        43 - 78 %                       LYMPHOCYTES                                       7 (L)                         13 - 44 %                       MONOCYTES                                         6                             4.0 - 12.0 %                    EOSINOPHILS                                       1                             0.5 - 7.8 %                     BASOPHILS                                         0                             0.0 - 2.0 %                     IMMATURE GRANULOCYTES                             0.3                           0.0 - 5.0 %                     ABS. NEUTROPHILS                                  10.8 (H)                      1.7 - 8.2 K/UL                  ABS. LYMPHOCYTES                                  0.9                           0.5 - 4.6 K/UL                  ABS. MONOCYTES                                    0.7                           0.1 - 1.3 K/UL                  ABS. EOSINOPHILS                                  0.1                           0.0 - 0.8 K/UL                  ABS. BASOPHILS                                    0.0                           0.0 - 0.2 K/UL                  ABS. IMM.  GRANS.                                  0.0                           0.0 - 0.5 K/UL             -METABOLIC PANEL, COMPREHENSIVE       Result                                            Value                         Ref Range                       Sodium                                            145                           136 - 145 mmol/L                Potassium 4.3                           3.5 - 5.1 mmol/L                Chloride                                          105                           98 - 107 mmol/L                 CO2                                               31                            21 - 32 mmol/L                  Anion gap                                         9                             7 - 16 mmol/L                   Glucose                                           104 (H)                       65 - 100 mg/dL                  BUN                                               14                            8 - 23 MG/DL                    Creatinine                                        1.04                          0.8 - 1.5 MG/DL                 GFR est AA                                        >60                           >60 ml/min/1.73m2               GFR est non-AA                                    >60                           >60 ml/min/1.73m2               Calcium                                           9.4                           8.3 - 10.4 MG/DL                Bilirubin, total                                  0.5                           0.2 - 1.1 MG/DL                 ALT (SGPT)                                        33                            12 - 65 U/L                     AST (SGOT)                                        35                            15 - 37 U/L                     Alk. phosphatase                                  102                           50 - 136 U/L                    Protein, total                                    7.4                           6.3 - 8.2 g/dL                  Albumin                                           3.0 (L)                       3.2 - 4.6 g/dL                  Globulin                                          4.4 (H)                       2.3 - 3.5 g/dL                  A-G Ratio                                         0.7 (L)                       1.2 - 3.5 -LIPASE       Result                                            Value                         Ref Range                       Lipase                                            249                           73 - 393 U/L               -URINALYSIS W/ RFLX MICROSCOPIC       Result                                            Value                         Ref Range                       Color                                             YELLOW                                                        Appearance                                        CLOUDY                                                        Specific gravity                                  1.022                         1.001 - 1.023                   pH (UA)                                           6.0                           5.0 - 9.0                       Protein                                           100 (A)                       NEG mg/dL                       Glucose                                           NEGATIVE                      mg/dL                           Ketone                                            TRACE (A)                     NEG mg/dL                       Bilirubin                                         NEGATIVE                      NEG                             Blood                                             LARGE (A)                     NEG                             Urobilinogen                                      0.2                           0.2 - 1.0 EU/dL                 Nitrites                                          NEGATIVE                      NEG                             Leukocyte Esterase                                SMALL (A)                     NEG                        -PROTHROMBIN TIME + INR       Result                                            Value                         Ref Range                       Prothrombin time                                  20.5 (H) 9.6 - 12.0 sec                  INR                                               1.9 (H)                       0.9 - 1.2                  )  Tests in the radiology section of CPT®: reviewed and ordered (Ct Urogram Wo Cont    Result Date: 5/2/2017  CT abdomen and pelvis without contrast COMPARISON: March 29, 2017. INDICATION: Abdominal pain. TECHNIQUE: CT imaging through the abdomen and pelvis was performed without intravenous. Radiation dose reduction techniques were used for this study:  Our CT scanners use one or all of the following: Automated exposure control, adjustment of the mA and/or kVp according to patient's size, iterative reconstruction. FINDINGS: Lung bases are unremarkable. Abdomen findings: There is a 10 mm calculus within the left renal pelvis. No associated hydronephrosis. No right renal calculus or hydronephrosis. There is fatty infiltration of the liver. The gallbladder is surgically absent. The unenhanced pancreas, spleen, and adrenal glands are within normal limits. Abdominal aorta is normal in course and caliber. IVC filter is present. No evidence of lymphadenopathy. Stomach is distended. Small bowel loops are of normal caliber. No small bowel obstruction. Pelvic findings: There is moderate stool volume throughout the colon. There is sigmoid and descending colon diverticulosis. There is mild fat stranding adjacent to the colon at the sigmoid/descending colon junction. This is consistent with diverticulitis. No perforation or associated abscess. Colon is otherwise normal in course and caliber. Appendix is normal. Small focus of air in the urinary bladder is likely secondary to the presence of Rutledge catheter. There is no free air or free fluid. There are degenerative changes of the spine. Bones are diffusely osteopenic. There is compression fracture of L2 vertebral body, appearing similar compared with prior exam.     IMPRESSION: 1.  Mild diverticulitis at the junction of the sigmoid/descending colon. No associated abscess or perforation. 2. A 10 mm calculus within the left renal pelvis. No associated hydronephrosis. 3. Compression fracture of L2 vertebral body, similar compared with March 29, 2017 study. This is likely secondary to osteopenia.  4. Fatty liver.     )  Tests in the medicine section of CPT®: ordered and reviewed      ED Course       Procedures

## 2017-05-02 NOTE — PROGRESS NOTES
TRANSFER - IN REPORT:    Verbal report received from [facility], RN on Collette Marcos  being received from ER for routine progression of care      Report consisted of patients Situation, Background, Assessment and   Recommendations(SBAR). Information from the following report(s) SBAR and Kardex was reviewed with the receiving nurse. Opportunity for questions and clarification was provided. Assessment completed upon patients arrival to unit and care assumed.

## 2017-05-02 NOTE — ED NOTES
Pt currently resting supine in bed in no acute distress at this moment in time. Pt stated that he would \"like to be admitted\". MD notified and aware.

## 2017-05-02 NOTE — PROGRESS NOTES
Primary Nurse Nadine Sol and RONNIE Arteaga performed a dual skin assessment on this patient No impairment noted  Chaz score is 22

## 2017-05-02 NOTE — ED NOTES
Spoke with pt's wife about bringing in CPAP machine per hospitalist. Pt's wife states she will  CPAP machine from rehab facility and bring it in this afternoon.

## 2017-05-03 LAB
ANION GAP BLD CALC-SCNC: 8 MMOL/L (ref 7–16)
BASOPHILS # BLD AUTO: 0 K/UL (ref 0–0.2)
BASOPHILS # BLD: 0 % (ref 0–2)
BUN SERPL-MCNC: 14 MG/DL (ref 8–23)
CALCIUM SERPL-MCNC: 8.8 MG/DL (ref 8.3–10.4)
CHLORIDE SERPL-SCNC: 106 MMOL/L (ref 98–107)
CO2 SERPL-SCNC: 29 MMOL/L (ref 21–32)
CREAT SERPL-MCNC: 0.88 MG/DL (ref 0.8–1.5)
DIFFERENTIAL METHOD BLD: ABNORMAL
EOSINOPHIL # BLD: 0.1 K/UL (ref 0–0.8)
EOSINOPHIL NFR BLD: 1 % (ref 0.5–7.8)
ERYTHROCYTE [DISTWIDTH] IN BLOOD BY AUTOMATED COUNT: 16.6 % (ref 11.9–14.6)
GLUCOSE BLD STRIP.AUTO-MCNC: 129 MG/DL (ref 65–100)
GLUCOSE BLD STRIP.AUTO-MCNC: 158 MG/DL (ref 65–100)
GLUCOSE BLD STRIP.AUTO-MCNC: 93 MG/DL (ref 65–100)
GLUCOSE BLD STRIP.AUTO-MCNC: 98 MG/DL (ref 65–100)
GLUCOSE SERPL-MCNC: 93 MG/DL (ref 65–100)
HCT VFR BLD AUTO: 38.6 % (ref 41.1–50.3)
HGB BLD-MCNC: 12.3 G/DL (ref 13.6–17.2)
IMM GRANULOCYTES # BLD: 0 K/UL (ref 0–0.5)
IMM GRANULOCYTES NFR BLD AUTO: 0.4 % (ref 0–5)
INR PPP: 2 (ref 0.9–1.2)
LYMPHOCYTES # BLD AUTO: 14 % (ref 13–44)
LYMPHOCYTES # BLD: 1.3 K/UL (ref 0.5–4.6)
MCH RBC QN AUTO: 30.1 PG (ref 26.1–32.9)
MCHC RBC AUTO-ENTMCNC: 31.9 G/DL (ref 31.4–35)
MCV RBC AUTO: 94.4 FL (ref 79.6–97.8)
MONOCYTES # BLD: 0.6 K/UL (ref 0.1–1.3)
MONOCYTES NFR BLD AUTO: 6 % (ref 4–12)
NEUTS SEG # BLD: 7.4 K/UL (ref 1.7–8.2)
NEUTS SEG NFR BLD AUTO: 79 % (ref 43–78)
PLATELET # BLD AUTO: 207 K/UL (ref 150–450)
PMV BLD AUTO: 10.2 FL (ref 10.8–14.1)
POTASSIUM SERPL-SCNC: 3.9 MMOL/L (ref 3.5–5.1)
PROTHROMBIN TIME: 21.4 SEC (ref 9.6–12)
RBC # BLD AUTO: 4.09 M/UL (ref 4.23–5.67)
SODIUM SERPL-SCNC: 143 MMOL/L (ref 136–145)
WBC # BLD AUTO: 9.4 K/UL (ref 4.3–11.1)

## 2017-05-03 PROCEDURE — 74011000258 HC RX REV CODE- 258: Performed by: INTERNAL MEDICINE

## 2017-05-03 PROCEDURE — 65270000029 HC RM PRIVATE

## 2017-05-03 PROCEDURE — 85610 PROTHROMBIN TIME: CPT | Performed by: INTERNAL MEDICINE

## 2017-05-03 PROCEDURE — 74011250636 HC RX REV CODE- 250/636: Performed by: INTERNAL MEDICINE

## 2017-05-03 PROCEDURE — 74011636637 HC RX REV CODE- 636/637: Performed by: INTERNAL MEDICINE

## 2017-05-03 PROCEDURE — 85025 COMPLETE CBC W/AUTO DIFF WBC: CPT | Performed by: INTERNAL MEDICINE

## 2017-05-03 PROCEDURE — 80048 BASIC METABOLIC PNL TOTAL CA: CPT | Performed by: INTERNAL MEDICINE

## 2017-05-03 PROCEDURE — 36415 COLL VENOUS BLD VENIPUNCTURE: CPT | Performed by: INTERNAL MEDICINE

## 2017-05-03 PROCEDURE — 82962 GLUCOSE BLOOD TEST: CPT

## 2017-05-03 PROCEDURE — 74011250637 HC RX REV CODE- 250/637: Performed by: INTERNAL MEDICINE

## 2017-05-03 PROCEDURE — 74011000250 HC RX REV CODE- 250: Performed by: INTERNAL MEDICINE

## 2017-05-03 RX ADMIN — ESCITALOPRAM OXALATE 10 MG: 10 TABLET ORAL at 08:50

## 2017-05-03 RX ADMIN — TAMSULOSIN HYDROCHLORIDE 0.4 MG: 0.4 CAPSULE ORAL at 08:51

## 2017-05-03 RX ADMIN — METRONIDAZOLE 500 MG: 500 INJECTION, SOLUTION INTRAVENOUS at 05:43

## 2017-05-03 RX ADMIN — RDII 250 MG CAPSULE 250 MG: at 17:55

## 2017-05-03 RX ADMIN — Medication 5 ML: at 05:43

## 2017-05-03 RX ADMIN — INSULIN LISPRO 2 UNITS: 100 INJECTION, SOLUTION INTRAVENOUS; SUBCUTANEOUS at 12:43

## 2017-05-03 RX ADMIN — WARFARIN SODIUM 6 MG: 5 TABLET ORAL at 22:08

## 2017-05-03 RX ADMIN — Medication 5 ML: at 22:10

## 2017-05-03 RX ADMIN — CEFTRIAXONE 1 G: 1 INJECTION, POWDER, FOR SOLUTION INTRAMUSCULAR; INTRAVENOUS at 08:51

## 2017-05-03 RX ADMIN — FAMOTIDINE 20 MG: 20 TABLET ORAL at 08:51

## 2017-05-03 RX ADMIN — FAMOTIDINE 20 MG: 20 TABLET ORAL at 17:55

## 2017-05-03 RX ADMIN — POLYETHYLENE GLYCOL (3350) 17 G: 17 POWDER, FOR SOLUTION ORAL at 08:50

## 2017-05-03 RX ADMIN — METRONIDAZOLE 500 MG: 500 INJECTION, SOLUTION INTRAVENOUS at 18:00

## 2017-05-03 RX ADMIN — LOSARTAN POTASSIUM 100 MG: 50 TABLET ORAL at 08:50

## 2017-05-03 RX ADMIN — Medication 5 ML: at 18:00

## 2017-05-03 RX ADMIN — RDII 250 MG CAPSULE 250 MG: at 08:50

## 2017-05-03 NOTE — PROGRESS NOTES
Hospitalist Progress Note    5/3/2017  Admit Date: 2017  1:17 AM   NAME: Marge Alberts   :  1945   MRN:  [id number]   Attending: Eloy Cardoza MD  PCP:  Shona Pierce MD    SUBJECTIVE:     Marge Alberts is a 65yoM with LLE DVT s/p thrombolysis (on coumadin) 3/2017, L2 compression fx, neurogenic bladder with chronic magana, recent [facility] UTI admitted on  with LLQ abd pain and nausea. Found to have UTI and likely diverticulitis. Still with some mild LLQ pain today, but improving. Denies n/v. Tolerating diet well. Review of Systems negative with exception of pertinent positives noted above      PHYSICAL EXAM       Visit Vitals    /71    Pulse 75    Temp 99 °F (37.2 °C)    Resp 16    Ht 5' 6\" (1.676 m)    Wt 126.1 kg (278 lb)    SpO2 95%    BMI 44.87 kg/m2      Temp (24hrs), Av.5 °F (36.9 °C), Min:97.3 °F (36.3 °C), Max:99 °F (37.2 °C)    Oxygen Therapy  O2 Sat (%): 95 % (17 0700)  Pulse via Oximetry: 78 beats per minute (17 1205)  O2 Device: Room air (17 0653)    Intake/Output Summary (Last 24 hours) at 17 1234  Last data filed at 17 0453   Gross per 24 hour   Intake              120 ml   Output              600 ml   Net             -480 ml          General: No acute distress. Head:  Atraumatic Normocephalic. Lungs:  CTA Bilaterally. Normal resp effort  CVS:  RRR, no murmurs  Abdomen: Soft, mild LLQ abd pain, no rebound or guarding, +NABS  MSK:  Spontaneously moves extremities. Neurologic:  AOx3.  No focal deficits  Psychiatry:      No anxiety/Depression      Recent Results (from the past 24 hour(s))   MRSA SCREEN - PCR (NASAL)    Collection Time: 17  3:20 PM   Result Value Ref Range    Special Requests: NO SPECIAL REQUESTS      Culture result:        MRSA target DNA is not detected (presumptive not colonized with MRSA)   GLUCOSE, POC    Collection Time: 17  3:24 PM   Result Value Ref Range    Glucose (POC) 87 65 - 100 mg/dL   LACTIC ACID, PLASMA    Collection Time: 05/02/17  3:35 PM   Result Value Ref Range    Lactic acid 1.5 0.4 - 2.0 MMOL/L   GLUCOSE, POC    Collection Time: 05/02/17  8:36 PM   Result Value Ref Range    Glucose (POC) 110 (H) 65 - 100 mg/dL   GLUCOSE, POC    Collection Time: 05/03/17  5:22 AM   Result Value Ref Range    Glucose (POC) 93 65 - 721 mg/dL   METABOLIC PANEL, BASIC    Collection Time: 05/03/17  5:44 AM   Result Value Ref Range    Sodium 143 136 - 145 mmol/L    Potassium 3.9 3.5 - 5.1 mmol/L    Chloride 106 98 - 107 mmol/L    CO2 29 21 - 32 mmol/L    Anion gap 8 7 - 16 mmol/L    Glucose 93 65 - 100 mg/dL    BUN 14 8 - 23 MG/DL    Creatinine 0.88 0.8 - 1.5 MG/DL    GFR est AA >60 >60 ml/min/1.73m2    GFR est non-AA >60 >60 ml/min/1.73m2    Calcium 8.8 8.3 - 10.4 MG/DL   CBC WITH AUTOMATED DIFF    Collection Time: 05/03/17  5:44 AM   Result Value Ref Range    WBC 9.4 4.3 - 11.1 K/uL    RBC 4.09 (L) 4.23 - 5.67 M/uL    HGB 12.3 (L) 13.6 - 17.2 g/dL    HCT 38.6 (L) 41.1 - 50.3 %    MCV 94.4 79.6 - 97.8 FL    MCH 30.1 26.1 - 32.9 PG    MCHC 31.9 31.4 - 35.0 g/dL    RDW 16.6 (H) 11.9 - 14.6 %    PLATELET 876 272 - 108 K/uL    MPV 10.2 (L) 10.8 - 14.1 FL    DF AUTOMATED      NEUTROPHILS 79 (H) 43 - 78 %    LYMPHOCYTES 14 13 - 44 %    MONOCYTES 6 4.0 - 12.0 %    EOSINOPHILS 1 0.5 - 7.8 %    BASOPHILS 0 0.0 - 2.0 %    IMMATURE GRANULOCYTES 0.4 0.0 - 5.0 %    ABS. NEUTROPHILS 7.4 1.7 - 8.2 K/UL    ABS. LYMPHOCYTES 1.3 0.5 - 4.6 K/UL    ABS. MONOCYTES 0.6 0.1 - 1.3 K/UL    ABS. EOSINOPHILS 0.1 0.0 - 0.8 K/UL    ABS. BASOPHILS 0.0 0.0 - 0.2 K/UL    ABS. IMM.  GRANS. 0.0 0.0 - 0.5 K/UL   PROTHROMBIN TIME + INR    Collection Time: 05/03/17  5:44 AM   Result Value Ref Range    Prothrombin time 21.4 (H) 9.6 - 12.0 sec    INR 2.0 (H) 0.9 - 1.2     GLUCOSE, POC    Collection Time: 05/03/17 11:16 AM   Result Value Ref Range    Glucose (POC) 158 (H) 65 - 100 mg/dL         Imaging /Procedures /Studies   CT UROGRAM WO CONT   Final Result   IMPRESSION:      1. Mild diverticulitis at the junction of the sigmoid/descending colon. No   associated abscess or perforation. 2. A 10 mm calculus within the left renal pelvis. No associated hydronephrosis. 3. Compression fracture of L2 vertebral body, similar compared with March 29, 2017 study. This is likely secondary to osteopenia. 4. Fatty liver. ASSESSMENT      Hospital Problems as of 5/3/2017  Date Reviewed: 3/21/2017          Codes Class Noted - Resolved POA    * (Principal)Catheter-associated urinary tract infection ([facility] ICD-10-CM: T83.511A, N39.0  ICD-9-CM: 996.64, 599.0  5/2/2017 - Present Yes        Neurogenic bladder (Chronic) ICD-10-CM: N31.9  ICD-9-CM: 596.54  5/2/2017 - Present Yes        Diverticulitis ICD-10-CM: K57.92  ICD-9-CM: 562.11  5/2/2017 - Present Yes        Chronic anticoagulation (Chronic) ICD-10-CM: Z79.01  ICD-9-CM: V58.61  5/2/2017 - Present Yes        DVT (deep venous thrombosis) ([facility] (Chronic) ICD-10-CM: I82.409  ICD-9-CM: 453.40  5/2/2017 - Present Yes        Nephrolithiasis (Chronic) ICD-10-CM: N20.0  ICD-9-CM: 592.0  5/2/2017 - Present Yes        Benign essential HTN ICD-10-CM: I10  ICD-9-CM: 401.1  9/13/2016 - Present Yes        Type 2 diabetes mellitus without complication ([facility] NNI-46-OO: E11.9  ICD-9-CM: 250.00  7/22/2016 - Present Yes        Obstructive sleep apnea syndrome ICD-10-CM: G47.33  ICD-9-CM: 327.23  7/22/2016 - Present Yes    Overview Signed 3/21/2017 10:40 PM by BIJAN Driscoll     Home CPAP             Morbid obesity due to excess calories [facility]) ICD-10-CM: E66.01  ICD-9-CM: 278.01  7/22/2016 - Present Yes                  Plan:  - Continue rocephin and flagyl to cover UTI and diverticulitis. Urine cx pending.  Rutledge exchanged on admission  - pharmacy for coumadin dosing  - continue home meds  - BS well controlled, continue SSI ACHS    DVT Prophylaxis: coumadin  Dispo: back to [facility] in next 1-2 days if medically stable    Jose R Chaidez MD

## 2017-05-03 NOTE — PROGRESS NOTES
Warfarin dosing per pharmacist    Freedom Whitehead is a 66 y.o. male. Height: 5' 6\" (167.6 cm)    Weight: 126.1 kg (278 lb)    Indication:  Hx of LLE DVT (3/2017)    Goal INR:  2-3    Home dose:  6 mg qhs     Risk factors or significant drug interactions:  metronidazole    Other anticoagulants:  none    Daily Monitoring  Date  INR     Warfarin dose HGB              Notes  5/2  1.9  6 mg  14.2  5/3  2.0  6 mg  12.3    Pharmacy consulted to dose warfarin on 5/2/17. INR up to 2. Continue 6 mg qhs. Daily INR. Pharmacy will continue to follow. Please call with any questions.     Thank you,  Gina Gates, PharmD  Clinical Pharmacist  [phone]

## 2017-05-03 NOTE — PROGRESS NOTES
Shift assessment complete. Pt resting in bed with CPAP in place. No complaints. Safety measures in place. Call light in reach.

## 2017-05-04 LAB
ANION GAP BLD CALC-SCNC: 9 MMOL/L (ref 7–16)
BACTERIA SPEC CULT: NORMAL
BASOPHILS # BLD AUTO: 0 K/UL (ref 0–0.2)
BASOPHILS # BLD: 0 % (ref 0–2)
BUN SERPL-MCNC: 11 MG/DL (ref 8–23)
CALCIUM SERPL-MCNC: 8.7 MG/DL (ref 8.3–10.4)
CHLORIDE SERPL-SCNC: 106 MMOL/L (ref 98–107)
CO2 SERPL-SCNC: 28 MMOL/L (ref 21–32)
CREAT SERPL-MCNC: 0.8 MG/DL (ref 0.8–1.5)
DIFFERENTIAL METHOD BLD: ABNORMAL
EOSINOPHIL # BLD: 0.2 K/UL (ref 0–0.8)
EOSINOPHIL NFR BLD: 2 % (ref 0.5–7.8)
ERYTHROCYTE [DISTWIDTH] IN BLOOD BY AUTOMATED COUNT: 16 % (ref 11.9–14.6)
GLUCOSE BLD STRIP.AUTO-MCNC: 102 MG/DL (ref 65–100)
GLUCOSE BLD STRIP.AUTO-MCNC: 107 MG/DL (ref 65–100)
GLUCOSE BLD STRIP.AUTO-MCNC: 89 MG/DL (ref 65–100)
GLUCOSE BLD STRIP.AUTO-MCNC: 97 MG/DL (ref 65–100)
GLUCOSE SERPL-MCNC: 103 MG/DL (ref 65–100)
HCT VFR BLD AUTO: 37.3 % (ref 41.1–50.3)
HGB BLD-MCNC: 12.1 G/DL (ref 13.6–17.2)
IMM GRANULOCYTES # BLD: 0 K/UL (ref 0–0.5)
IMM GRANULOCYTES NFR BLD AUTO: 0.3 % (ref 0–5)
INR PPP: 2.1 (ref 0.9–1.2)
LYMPHOCYTES # BLD AUTO: 19 % (ref 13–44)
LYMPHOCYTES # BLD: 1.5 K/UL (ref 0.5–4.6)
MCH RBC QN AUTO: 30.1 PG (ref 26.1–32.9)
MCHC RBC AUTO-ENTMCNC: 32.4 G/DL (ref 31.4–35)
MCV RBC AUTO: 92.8 FL (ref 79.6–97.8)
MONOCYTES # BLD: 0.6 K/UL (ref 0.1–1.3)
MONOCYTES NFR BLD AUTO: 8 % (ref 4–12)
NEUTS SEG # BLD: 5.5 K/UL (ref 1.7–8.2)
NEUTS SEG NFR BLD AUTO: 71 % (ref 43–78)
PLATELET # BLD AUTO: 212 K/UL (ref 150–450)
PMV BLD AUTO: 10 FL (ref 10.8–14.1)
POTASSIUM SERPL-SCNC: 3.6 MMOL/L (ref 3.5–5.1)
PROTHROMBIN TIME: 23.2 SEC (ref 9.6–12)
RBC # BLD AUTO: 4.02 M/UL (ref 4.23–5.67)
SERVICE CMNT-IMP: NORMAL
SODIUM SERPL-SCNC: 143 MMOL/L (ref 136–145)
WBC # BLD AUTO: 7.8 K/UL (ref 4.3–11.1)

## 2017-05-04 PROCEDURE — 74011000302 HC RX REV CODE- 302: Performed by: INTERNAL MEDICINE

## 2017-05-04 PROCEDURE — 74011250636 HC RX REV CODE- 250/636: Performed by: INTERNAL MEDICINE

## 2017-05-04 PROCEDURE — 85610 PROTHROMBIN TIME: CPT | Performed by: INTERNAL MEDICINE

## 2017-05-04 PROCEDURE — 74011000250 HC RX REV CODE- 250: Performed by: INTERNAL MEDICINE

## 2017-05-04 PROCEDURE — 97110 THERAPEUTIC EXERCISES: CPT

## 2017-05-04 PROCEDURE — 65270000029 HC RM PRIVATE

## 2017-05-04 PROCEDURE — 74011250637 HC RX REV CODE- 250/637: Performed by: INTERNAL MEDICINE

## 2017-05-04 PROCEDURE — 74011000258 HC RX REV CODE- 258: Performed by: INTERNAL MEDICINE

## 2017-05-04 PROCEDURE — 97161 PT EVAL LOW COMPLEX 20 MIN: CPT

## 2017-05-04 PROCEDURE — 80048 BASIC METABOLIC PNL TOTAL CA: CPT | Performed by: INTERNAL MEDICINE

## 2017-05-04 PROCEDURE — 36415 COLL VENOUS BLD VENIPUNCTURE: CPT | Performed by: INTERNAL MEDICINE

## 2017-05-04 PROCEDURE — 85025 COMPLETE CBC W/AUTO DIFF WBC: CPT | Performed by: INTERNAL MEDICINE

## 2017-05-04 PROCEDURE — 82962 GLUCOSE BLOOD TEST: CPT

## 2017-05-04 PROCEDURE — 86580 TB INTRADERMAL TEST: CPT | Performed by: INTERNAL MEDICINE

## 2017-05-04 RX ADMIN — TUBERCULIN PURIFIED PROTEIN DERIVATIVE 5 UNITS: 5 INJECTION INTRADERMAL at 10:08

## 2017-05-04 RX ADMIN — METRONIDAZOLE 500 MG: 500 INJECTION, SOLUTION INTRAVENOUS at 02:44

## 2017-05-04 RX ADMIN — POLYETHYLENE GLYCOL (3350) 17 G: 17 POWDER, FOR SOLUTION ORAL at 10:10

## 2017-05-04 RX ADMIN — ESCITALOPRAM OXALATE 10 MG: 10 TABLET ORAL at 10:09

## 2017-05-04 RX ADMIN — METRONIDAZOLE 500 MG: 500 INJECTION, SOLUTION INTRAVENOUS at 17:30

## 2017-05-04 RX ADMIN — TAMSULOSIN HYDROCHLORIDE 0.4 MG: 0.4 CAPSULE ORAL at 10:09

## 2017-05-04 RX ADMIN — WARFARIN SODIUM 6 MG: 5 TABLET ORAL at 21:05

## 2017-05-04 RX ADMIN — Medication 10 ML: at 17:30

## 2017-05-04 RX ADMIN — FAMOTIDINE 20 MG: 20 TABLET ORAL at 10:10

## 2017-05-04 RX ADMIN — Medication 10 ML: at 21:07

## 2017-05-04 RX ADMIN — LOSARTAN POTASSIUM 100 MG: 50 TABLET ORAL at 10:09

## 2017-05-04 RX ADMIN — METRONIDAZOLE 500 MG: 500 INJECTION, SOLUTION INTRAVENOUS at 10:08

## 2017-05-04 RX ADMIN — CEFTRIAXONE 1 G: 1 INJECTION, POWDER, FOR SOLUTION INTRAMUSCULAR; INTRAVENOUS at 10:08

## 2017-05-04 RX ADMIN — RDII 250 MG CAPSULE 250 MG: at 17:30

## 2017-05-04 RX ADMIN — RDII 250 MG CAPSULE 250 MG: at 10:10

## 2017-05-04 RX ADMIN — FAMOTIDINE 20 MG: 20 TABLET ORAL at 17:30

## 2017-05-04 RX ADMIN — Medication 10 ML: at 06:15

## 2017-05-04 NOTE — PROGRESS NOTES
Mr. Reina Castle resting in bed quietly. Alert, oriented in all spheres. Lung sounds diminished; on room air. 1+ nonpitting edema noted to b/l lower extremities. Chronic magana catheter in place draining adolfo, clear urine. Without needs or complaints.  Updated on plan of care per request. Will monitor with call light in lap and door closed per request.

## 2017-05-04 NOTE — PROGRESS NOTES
Warfarin dosing per pharmacist    Jennifer Peraza is a 66 y.o. male. Height: 5' 6\" (167.6 cm)    Weight: 126.1 kg (278 lb)    Indication:  Hx of LLE DVT (3/2017)    Goal INR:  2-3    Home dose:  6 mg qhs     Risk factors or significant drug interactions:  metronidazole    Other anticoagulants:  none    Daily Monitoring  Date  INR     Warfarin dose HGB              Notes  5/2  1.9  6 mg  14.2  5/3  2.0  6 mg  12.3  5/4  2.1  6 mg  12.1    Pharmacy consulted to dose warfarin on 5/2/17. INR up to 2.1. Continue 6 mg qhs. Daily INR. Pharmacy will continue to follow. Please call with any questions.     Thank you,  Samra Gomez, PharmD, [facility]  Clinical Pharmacist  [phone]

## 2017-05-04 NOTE — PROGRESS NOTES
Problem: Mobility Impaired (Adult and Pediatric)  Goal: *Acute Goals and Plan of Care (Insert Text)  STG:  (1.)Mr. Jorge Alfaro will move from supine to sit and sit to supine , scoot up and down and roll side to side with INDEPENDENT within 7 day(s). (2.)Mr. Jorge Alfaro will transfer from bed to chair and chair to bed with MODIFIED INDEPENDENCE using the least restrictive device within 7 day(s). (3.)Mr. Jorge Alfaro will ambulate with SUPERVISION for 500+ feet with the least restrictive device within 7 day(s). ________________________________________________________________________________________________      PHYSICAL THERAPY: INITIAL ASSESSMENT, TREATMENT DAY: DAY OF ASSESSMENT, PM 5/4/2017  INPATIENT: Hospital Day: 3  [place] / [place] / Product Type: Medicare /      NAME/AGE/GENDER: Francisca Patterson is a 66 y.o. male            PRIMARY DIAGNOSIS: Sepsis ([address] Catheter-associated urinary tract infection ([facility] Catheter-associated urinary tract infection ([facility]        ICD-10: Treatment Diagnosis:       · Generalized Muscle Weakness (M62.81)  · Difficulty in walking, Not elsewhere classified (R26.2)   Precaution/Allergies:  Sulfite       ASSESSMENT:      Mr. Jorge Alfaro is supine in bed upon contact and agreeable to PT evaluation. Pt comes from rehab [facility]) since last hospitalization. Pt reports rehab is really helping him regain his strength, mobility, and endurance and hopes to return at discharge to finish his rehab so that he can return to his PLOF. Pt reports living in 2 story home with 2 steps to enter without railing and 16 steps to second floor to get to bedroom with railing. At baseline, pt reports independence with gait and ADLs. He reports ambulating household and community distances, drives, and enjoys working in his garden. Pt is SBA with bed mobility this afternoon requiring HOB elevated.  Pt is CGA with sit to stand to RW and ambulates in hallway 150 ft X1 and 100 ft X1 with CGA. Pt demonstrates fluctuations in gait speed and VC to slow down and takes his time. Pt returned to room and performed exercises while sitting EOB and supine in bed. Pt would benefit from return to rehab following discharge from hospital for continued work towards pt return to [facility] and safe return home. Lizy Tejada will benefit from skilled PT (medically necessary) to address decreased strength, decreased balance, decreased functional tolerance, decreased cardiopulmonary endurance affecting participation in basic ADLs and functional tasks. This section established at most recent assessment   PROBLEM LIST (Impairments causing functional limitations):  1. Decreased Strength  2. Decreased ADL/Functional Activities  3. Decreased Ambulation Ability/Technique  4. Decreased Balance  5. Increased Pain  6. Decreased Activity Tolerance  7. Increased Fatigue  8. Increased Shortness of Breath    INTERVENTIONS PLANNED: (Benefits and precautions of physical therapy have been discussed with the patient.)  1. Balance Exercise  2. Bed Mobility  3. Family Education  4. Gait Training  5. Neuromuscular Re-education/Strengthening  6. Therapeutic Activites  7. Therapeutic Exercise/Strengthening  8. Group Therapy      TREATMENT PLAN: Frequency/Duration: 3 times a week for duration of hospital stay  Rehabilitation Potential For Stated Goals: GOOD      RECOMMENDED REHABILITATION/EQUIPMENT: (at time of discharge pending progress): Continue Skilled Therapy and Rehab. HISTORY:   History of Present Injury/Illness (Reason for Referral):  See H&P below  Mr. Klaus Bob is a 65 yo WM with PMH of LLE DVT s/p thrombolysis and anticoagulated with coumadin 3,2017, L2 compression fracture, neurogenic bladder requiring magana at [facility], recent [id number] [address] evaluated with acute onset of LLQ ABD pain/emesis. Denies rectal bleed/melena/fever/urine complaints.  CT AP shows left 10 mm nephrolithiasis without obstruction/diverticulitis/constipation and UA positive for UTI. He has received levaquin/flagyl in ED. Past Medical History/Comorbidities:   Mr. Shaista Michelle  has a past medical history of Calculus of kidney; Diabetes ([facility] [address] [address] and DVT (deep venous thrombosis) ([facility] [address] (2013). Mr. Shaista Michelle  has a past surgical history that includes urological.  Social History/Living Environment:   Home Environment: Rehabilitation facility  # Steps to Enter: 0  One/Two Story Residence: One story  Living Alone: No  Support Systems: Family member(s), Spouse/Significant Other/Partner  Patient Expects to be Discharged to[de-identified] Rehabilitation facility  Current DME Used/Available at Home: CPAP  Tub or Shower Type: Shower  Prior Level of Function/Work/Activity:  Indep with gait and ADLs      Number of Personal Factors/Comorbidities that affect the Plan of Care: 1-2: MODERATE COMPLEXITY   EXAMINATION:   Most Recent Physical Functioning:   Gross Assessment:  AROM: Generally decreased, functional  Strength: Generally decreased, functional  Sensation: Intact               Posture:     Balance:  Sitting: Intact; Without support  Standing: Impaired;Pull to stand; With support Bed Mobility:  Supine to Sit: Stand-by asssistance  Sit to Supine: Stand-by asssistance  Wheelchair Mobility:     Transfers:  Sit to Stand: Contact guard assistance  Gait:     Base of Support: Narrowed  Speed/Tana: Slow  Step Length: Left shortened;Right shortened  Gait Abnormalities: Decreased step clearance; Path deviations  Distance (ft): 150 Feet (ft) (X1; 100 ft X1)  Assistive Device: Walker, rolling  Ambulation - Level of Assistance: Contact guard assistance  Interventions: Safety awareness training; Tactile cues; Verbal cues       Body Structures Involved:  1. Heart  2. Lungs  3. Bones  4. Joints  5. Muscles Body Functions Affected:  1. Sensory/Pain  2. Cardio  3. Respiratory  4. Neuromusculoskeletal  5. Movement Related Activities and Participation Affected:  1.  General Tasks and Demands  2. Mobility  3. Self Care  4. Domestic Life  5. Interpersonal Interactions and Relationships  6. Community, Social and [place]   Number of elements that affect the Plan of Care: 4+: HIGH COMPLEXITY   CLINICAL PRESENTATION:   Presentation: Stable and uncomplicated: LOW COMPLEXITY   CLINICAL DECISION MAKIN [facility] Mobility Inpatient Short Form  How much difficulty does the patient currently have. .. Unable A Lot A Little None   1. Turning over in bed (including adjusting bedclothes, sheets and blankets)? [ ] 1   [ ] 2   [ ] 3   [X] 4   2. Sitting down on and standing up from a chair with arms ( e.g., wheelchair, bedside commode, etc.)   [ ] 1   [ ] 2   [X] 3   [ ] 4   3. Moving from lying on back to sitting on the side of the bed? [ ] 1   [ ] 2   [X] 3   [ ] 4   How much help from another person does the patient currently need. .. Total A Lot A Little None   4. Moving to and from a bed to a chair (including a wheelchair)? [ ] 1   [ ] 2   [X] 3   [ ] 4   5. Need to walk in hospital room? [ ] 1   [ ] 2   [X] 3   [ ] 4   6. Climbing 3-5 steps with a railing? [ ] 1   [ ] 2   [X] 3   [ ] 4   © , Trustees of [address] [place] [address] [address] [address], under license to [employer]. All rights reserved    Score:  Initial: 19 Most Recent: X (Date: -- )     Interpretation of Tool:  Represents activities that are increasingly more difficult (i.e. Bed mobility, Transfers, Gait).        Score 24 23 22-20 19-15 14-10 9-7 6       Modifier CH CI CJ CK CL CM CN         · Mobility - Walking and Moving Around:               - CURRENT STATUS:    CK - 40%-59% impaired, limited or restricted               - GOAL STATUS:           CJ - 20%-39% impaired, limited or restricted               - D/C STATUS:                       ---------------To be determined---------------  [place] / [place] / Product Type: Medicare /       Medical Necessity: · Patient is expected to demonstrate progress in strength, balance, coordination and functional technique to decrease assistance required with gait and functional mobility. Reason for Services/Other Comments:  · Patient continues to require skilled intervention due to decreased strength, decreased balance, decreased functional tolerance, decreased cardiopulmonary endurance affecting participation in basic ADLs and functional tasks. .   Use of outcome tool(s) and clinical judgement create a POC that gives a: Clear prediction of patient's progress: LOW COMPLEXITY                 TREATMENT:   (In addition to Assessment/Re-Assessment sessions the following treatments were rendered)   Pre-treatment Symptoms/Complaints:  No complaints at this time. Pain: Initial:   Pain Intensity 1: 0  Post Session:  0/10      Therapeutic Exercise: (10 Minutes):  Exercises per grid below to improve mobility, strength, balance and coordination. Required minimal visual, verbal, manual and tactile cues to promote proper body alignment, promote proper body posture and promote proper body mechanics. Progressed range, repetitions and complexity of movement as indicated. Date:  5/4/17 Date:    Date:      Activity/Exercise Parameters Parameters Parameters   Seated LAQ 15 XB       Seated marches 15 X B       Supine ankle pumps 15 X B       Supine quad sets 15 XB       Supine glute set 15 X       Supine heel slides 15 XB       Supine hip abduction 15XB                Braces/Orthotics/Lines/Etc:   · IV  · magana catheter  · O2 Device: Room air  Treatment/Session Assessment:    · Response to Treatment:  Pt participated well with PT this afternoon. Motivated to return to [facility]  · Interdisciplinary Collaboration:  · Physical Therapist  · Registered Nurse  · After treatment position/precautions:  · Supine in bed  · Bed/Chair-wheels locked  · Bed in low position  · Call light within reach  · Compliance with Program/Exercises:  Will assess as treatment progresses. · Recommendations/Intent for next treatment session: \"Next visit will focus on advancements to more challenging activities and reduction in assistance provided\".   Total Treatment Duration:  PT Patient Time In/Time Out  Time In: 1333  Time Out: 33 Daiana Gaitan

## 2017-05-04 NOTE — PROGRESS NOTES
Mr. Shae Madrigal in bed with [facility] elevated watching TV. Has just finished dinner tray. Worked with therapy and walked hallway today. Ate half of all meals. Zinc barrier cream provided and applied to murali/rectal area. Without further needs or complaints.  Call light in lap and door closed per request.

## 2017-05-04 NOTE — PROGRESS NOTES
Patient was admitted from [facility] where he has been in short-term rehab since his last hospitalization. Patient's wife would like him to return there at discharge to complete rehab. Bed request sent to [facility] and spoke with Razia Butt in Admissions there. PPD is 27days old, so she requested a new one be placed prior to patient returning. Case Management will continue to follow for discharge planning.     Care Management Interventions  Transition of Care Consult (CM Consult): Discharge Planning  Discharge Durable Medical Equipment: No  Physical Therapy Consult: Yes  Occupational Therapy Consult: Yes  Speech Therapy Consult: No  Current Support Network: Lives with Spouse, Own Home  Confirm Follow Up Transport: Family  Plan discussed with Pt/Family/Caregiver: Yes  Freedom of Choice Offered: Yes  Discharge Location  Discharge Placement: Rehab Unit Subacute

## 2017-05-04 NOTE — PROGRESS NOTES
Hospitalist Progress Note    2017  Admit Date: 2017  1:17 AM   NAME: Freedom Whitehead   :  1945   MRN:  [id number]   Attending: Antionette Myers MD  PCP:  Olu Shannon MD    SUBJECTIVE:     Freedom Whitehead is a 65yoM with LLE DVT s/p thrombolysis (on coumadin) 3/2017, L2 compression fx, neurogenic bladder with chronic magana, recent [facility] UTI admitted on  with LLQ abd pain and nausea. Found to have UTI and likely diverticulitis. LLQ pain significantly improved. Denies n/v. Tolerating diet well. Review of Systems negative with exception of pertinent positives noted above      PHYSICAL EXAM       Visit Vitals    /82    Pulse 80    Temp 99.1 °F (37.3 °C)    Resp 18    Ht 5' 6\" (1.676 m)    Wt 126.1 kg (278 lb)    SpO2 97%    BMI 44.87 kg/m2      Temp (24hrs), Av.1 °F (37.3 °C), Min:98.3 °F (36.8 °C), Max:99.9 °F (37.7 °C)    Oxygen Therapy  O2 Sat (%): 97 % (17 1126)  Pulse via Oximetry: 78 beats per minute (17 1205)  O2 Device: Room air (17 0653)    Intake/Output Summary (Last 24 hours) at 17 1234  Last data filed at 17 0614   Gross per 24 hour   Intake              360 ml   Output              650 ml   Net             -290 ml          General: No acute distress. Head:  Atraumatic Normocephalic. Lungs:  CTA Bilaterally. Normal resp effort  CVS:  RRR, no murmurs  Abdomen: Soft, NTTP, +NABS  MSK:  Spontaneously moves extremities. Neurologic:  AOx3.  No focal deficits  Psychiatry:      No anxiety/Depression      Recent Results (from the past 24 hour(s))   GLUCOSE, POC    Collection Time: 17  3:32 PM   Result Value Ref Range    Glucose (POC) 129 (H) 65 - 100 mg/dL   GLUCOSE, POC    Collection Time: 17  8:42 PM   Result Value Ref Range    Glucose (POC) 98 65 - 100 mg/dL   GLUCOSE, POC    Collection Time: 17  5:16 AM   Result Value Ref Range    Glucose (POC) 97 65 - 100 mg/dL   PROTHROMBIN TIME + INR    Collection Time: 05/04/17  5:20 AM   Result Value Ref Range    Prothrombin time 23.2 (H) 9.6 - 12.0 sec    INR 2.1 (H) 0.9 - 1.2     CBC WITH AUTOMATED DIFF    Collection Time: 05/04/17  5:20 AM   Result Value Ref Range    WBC 7.8 4.3 - 11.1 K/uL    RBC 4.02 (L) 4.23 - 5.67 M/uL    HGB 12.1 (L) 13.6 - 17.2 g/dL    HCT 37.3 (L) 41.1 - 50.3 %    MCV 92.8 79.6 - 97.8 FL    MCH 30.1 26.1 - 32.9 PG    MCHC 32.4 31.4 - 35.0 g/dL    RDW 16.0 (H) 11.9 - 14.6 %    PLATELET 083 315 - 666 K/uL    MPV 10.0 (L) 10.8 - 14.1 FL    DF AUTOMATED      NEUTROPHILS 71 43 - 78 %    LYMPHOCYTES 19 13 - 44 %    MONOCYTES 8 4.0 - 12.0 %    EOSINOPHILS 2 0.5 - 7.8 %    BASOPHILS 0 0.0 - 2.0 %    IMMATURE GRANULOCYTES 0.3 0.0 - 5.0 %    ABS. NEUTROPHILS 5.5 1.7 - 8.2 K/UL    ABS. LYMPHOCYTES 1.5 0.5 - 4.6 K/UL    ABS. MONOCYTES 0.6 0.1 - 1.3 K/UL    ABS. EOSINOPHILS 0.2 0.0 - 0.8 K/UL    ABS. BASOPHILS 0.0 0.0 - 0.2 K/UL    ABS. IMM. GRANS. 0.0 0.0 - 0.5 K/UL   METABOLIC PANEL, BASIC    Collection Time: 05/04/17  5:20 AM   Result Value Ref Range    Sodium 143 136 - 145 mmol/L    Potassium 3.6 3.5 - 5.1 mmol/L    Chloride 106 98 - 107 mmol/L    CO2 28 21 - 32 mmol/L    Anion gap 9 7 - 16 mmol/L    Glucose 103 (H) 65 - 100 mg/dL    BUN 11 8 - 23 MG/DL    Creatinine 0.80 0.8 - 1.5 MG/DL    GFR est AA >60 >60 ml/min/1.73m2    GFR est non-AA >60 >60 ml/min/1.73m2    Calcium 8.7 8.3 - 10.4 MG/DL   GLUCOSE, POC    Collection Time: 05/04/17 11:05 AM   Result Value Ref Range    Glucose (POC) 102 (H) 65 - 100 mg/dL         Imaging /Procedures /Studies   CT UROGRAM WO CONT   Final Result   IMPRESSION:      1. Mild diverticulitis at the junction of the sigmoid/descending colon. No   associated abscess or perforation. 2. A 10 mm calculus within the left renal pelvis. No associated hydronephrosis. 3. Compression fracture of L2 vertebral body, similar compared with March 29, 2017 study. This is likely secondary to osteopenia. 4. Fatty liver. ASSESSMENT      Hospital Problems as of 5/4/2017  Date Reviewed: 3/21/2017          Codes Class Noted - Resolved POA    * (Principal)Catheter-associated urinary tract infection ([facility] ICD-10-CM: T83.511A, N39.0  ICD-9-CM: 996.64, 599.0  5/2/2017 - Present Yes        Neurogenic bladder (Chronic) ICD-10-CM: N31.9  ICD-9-CM: 596.54  5/2/2017 - Present Yes        Diverticulitis ICD-10-CM: K57.92  ICD-9-CM: 562.11  5/2/2017 - Present Yes        Chronic anticoagulation (Chronic) ICD-10-CM: Z79.01  ICD-9-CM: V58.61  5/2/2017 - Present Yes        DVT (deep venous thrombosis) ([facility] (Chronic) ICD-10-CM: I82.409  ICD-9-CM: 453.40  5/2/2017 - Present Yes        Nephrolithiasis (Chronic) ICD-10-CM: N20.0  ICD-9-CM: 592.0  5/2/2017 - Present Yes        Benign essential HTN ICD-10-CM: I10  ICD-9-CM: 401.1  9/13/2016 - Present Yes        Type 2 diabetes mellitus without complication ([facility]-02-PG: E11.9  ICD-9-CM: 250.00  7/22/2016 - Present Yes        Obstructive sleep apnea syndrome ICD-10-CM: G47.33  ICD-9-CM: 327.23  7/22/2016 - Present Yes    Overview Signed 3/21/2017 10:40 PM by BIJAN Gómez     Home CPAP             Morbid obesity due to excess calories [facility]) ICD-10-CM: E66.01  ICD-9-CM: 278.01  7/22/2016 - Present Yes                  Plan:  - Continue rocephin and flagyl to cover UTI and diverticulitis. Following urine cx.  Rutledge exchanged on admission  - pharmacy for coumadin dosing  - continue home meds  - BS well controlled, continue SSI ACHS    DVT Prophylaxis: coumadin  Dispo: back to [facility] hopefully tomorrow if medically stable    Stephen Reese MD

## 2017-05-05 LAB
GLUCOSE BLD STRIP.AUTO-MCNC: 105 MG/DL (ref 65–100)
GLUCOSE BLD STRIP.AUTO-MCNC: 108 MG/DL (ref 65–100)
GLUCOSE BLD STRIP.AUTO-MCNC: 114 MG/DL (ref 65–100)
GLUCOSE BLD STRIP.AUTO-MCNC: 146 MG/DL (ref 65–100)
INR PPP: 2 (ref 0.9–1.2)
MM INDURATION POC: NORMAL MM (ref 0–5)
PPD POC: NORMAL NEGATIVE
PROTHROMBIN TIME: 22.1 SEC (ref 9.6–12)

## 2017-05-05 PROCEDURE — 74011000258 HC RX REV CODE- 258: Performed by: INTERNAL MEDICINE

## 2017-05-05 PROCEDURE — 74011250636 HC RX REV CODE- 250/636: Performed by: INTERNAL MEDICINE

## 2017-05-05 PROCEDURE — 65270000029 HC RM PRIVATE

## 2017-05-05 PROCEDURE — 85610 PROTHROMBIN TIME: CPT | Performed by: INTERNAL MEDICINE

## 2017-05-05 PROCEDURE — 82962 GLUCOSE BLOOD TEST: CPT

## 2017-05-05 PROCEDURE — 74011250637 HC RX REV CODE- 250/637: Performed by: INTERNAL MEDICINE

## 2017-05-05 PROCEDURE — 74011250637 HC RX REV CODE- 250/637: Performed by: HOSPITALIST

## 2017-05-05 PROCEDURE — 97530 THERAPEUTIC ACTIVITIES: CPT

## 2017-05-05 PROCEDURE — 36415 COLL VENOUS BLD VENIPUNCTURE: CPT | Performed by: INTERNAL MEDICINE

## 2017-05-05 PROCEDURE — 97165 OT EVAL LOW COMPLEX 30 MIN: CPT

## 2017-05-05 PROCEDURE — 74011000250 HC RX REV CODE- 250: Performed by: INTERNAL MEDICINE

## 2017-05-05 RX ORDER — METRONIDAZOLE 500 MG/1
500 TABLET ORAL 3 TIMES DAILY
Status: DISCONTINUED | OUTPATIENT
Start: 2017-05-05 | End: 2017-05-06 | Stop reason: HOSPADM

## 2017-05-05 RX ADMIN — WARFARIN SODIUM 6 MG: 5 TABLET ORAL at 21:21

## 2017-05-05 RX ADMIN — POLYETHYLENE GLYCOL (3350) 17 G: 17 POWDER, FOR SOLUTION ORAL at 08:44

## 2017-05-05 RX ADMIN — Medication 10 ML: at 06:00

## 2017-05-05 RX ADMIN — RDII 250 MG CAPSULE 250 MG: at 08:44

## 2017-05-05 RX ADMIN — ESCITALOPRAM OXALATE 10 MG: 10 TABLET ORAL at 08:44

## 2017-05-05 RX ADMIN — TAMSULOSIN HYDROCHLORIDE 0.4 MG: 0.4 CAPSULE ORAL at 08:44

## 2017-05-05 RX ADMIN — RDII 250 MG CAPSULE 250 MG: at 17:52

## 2017-05-05 RX ADMIN — Medication 10 ML: at 17:53

## 2017-05-05 RX ADMIN — METRONIDAZOLE 500 MG: 500 INJECTION, SOLUTION INTRAVENOUS at 10:31

## 2017-05-05 RX ADMIN — CEFTRIAXONE 1 G: 1 INJECTION, POWDER, FOR SOLUTION INTRAMUSCULAR; INTRAVENOUS at 08:44

## 2017-05-05 RX ADMIN — METRONIDAZOLE 500 MG: 500 INJECTION, SOLUTION INTRAVENOUS at 03:31

## 2017-05-05 RX ADMIN — Medication 10 ML: at 21:21

## 2017-05-05 RX ADMIN — LOSARTAN POTASSIUM 100 MG: 50 TABLET ORAL at 08:44

## 2017-05-05 RX ADMIN — FAMOTIDINE 20 MG: 20 TABLET ORAL at 17:52

## 2017-05-05 RX ADMIN — METRONIDAZOLE 500 MG: 500 TABLET ORAL at 21:21

## 2017-05-05 RX ADMIN — FAMOTIDINE 20 MG: 20 TABLET ORAL at 08:44

## 2017-05-05 NOTE — PROGRESS NOTES
Met with patient to discuss discharge plans. We are expecting discharge back to [facility] tomorrow morning as long as he is medically ready. Patient has requested return to the same area (same room as possible) of [facility] as where he was prior to admission. Spoke with Jacy at [facility] and patient will return to his same room. In addition, patient states he will probably ask that his wife transport him to rehab rather than using ambulance transport. He will speak with his wife this evening and confirm the plans tomorrow. Case Management will continue to follow.     Care Management Interventions  Transition of Care Consult (CM Consult): Discharge Planning  Discharge Durable Medical Equipment: No  Physical Therapy Consult: Yes  Occupational Therapy Consult: Yes  Speech Therapy Consult: No  Current Support Network: Lives with Spouse, Own Home  Confirm Follow Up Transport: Family  Plan discussed with Pt/Family/Caregiver: Yes  Freedom of Choice Offered: Yes  Discharge Location  Discharge Placement: Rehab Unit Subacute

## 2017-05-05 NOTE — PROGRESS NOTES
Mr. Arriola Left resting in bed eating breakfast tray. Alert, oriented in all spheres. However, with some confusion regarding current treatment plan. Continuously asking \"So what is the plan for me? How long will I be here? Is the doctor coming by today? \" lung sounds clear on room air. 1+ edema noted to legs. Rutledge catheter is draining clear, yellow urine. Without needs at this time. Giovany alarm in place for safety. Will monitor.

## 2017-05-05 NOTE — PROGRESS NOTES
SEPSIS NAVIGATOR: Pt. Up in recliner. Alert and oriented. Sepsis education pack reviewed with pt. And given to him. He plans to be discharged to the [facility] for rehab for approx. 3 weeks and then back to his home. Opportunity for questions/answers provided. Verbalizes understanding.

## 2017-05-05 NOTE — PROGRESS NOTES
PM assessment completed. Pt laying in bed. RR even and unlabored. Denies any pain. Rutledge intact and below pt. AAO x 3. Aware to call for assistance when needed. Bed locked, in low position, call light in reach. Will monitor.

## 2017-05-05 NOTE — PROGRESS NOTES
Problem: Mobility Impaired (Adult and Pediatric)  Goal: *Acute Goals and Plan of Care (Insert Text)  STG:  (1.)Mr. Gio James will move from supine to sit and sit to supine , scoot up and down and roll side to side with INDEPENDENT within 7 day(s). (2.)Mr. Gio James will transfer from bed to chair and chair to bed with MODIFIED INDEPENDENCE using the least restrictive device within 7 day(s). (3.)Mr. Gio James will ambulate with SUPERVISION for 500+ feet with the least restrictive device within 7 day(s). ________________________________________________________________________________________________      PHYSICAL THERAPY: Daily Note, Treatment Day: 1st and AM 5/5/2017  INPATIENT: Hospital Day: 4  [place] / Product Type: Medicare /      NAME/AGE/GENDER: Anjum Monroy is a 66 y.o. male            PRIMARY DIAGNOSIS: Sepsis ([facility] [address] Catheter-associated urinary tract infection ([facility] [address] Catheter-associated urinary tract infection ([facility] [address]        ICD-10: Treatment Diagnosis:       · Generalized Muscle Weakness (M62.81)  · Difficulty in walking, Not elsewhere classified (R26.2)   Precaution/Allergies:  Sulfite       ASSESSMENT:      Mr. Gio James is sitting up in recliner upon contact and agreeable to PT treatment this morning. Pt is Juanito from low sitting chair to RW. Pt ambulated 150 ft X 1 and 100 ft X1 with RW and CGA. Pt demonstrated improvements in step/stride length this morning compared to last session. Pt returned to room and seated in bedside recliner. Pt declined exercises this morning due to lunch now arriving to room. Pt left up in chair with all needs met and within reach and reminded to call for assistance. Pt reports understanding. Pt demonstrated improvement with more normalized gait pattern and is making good progress towards goals. This section established at most recent assessment   PROBLEM LIST (Impairments causing functional limitations):  1.  Decreased Strength  2. Decreased ADL/Functional Activities  3. Decreased Ambulation Ability/Technique  4. Decreased Balance  5. Increased Pain  6. Decreased Activity Tolerance  7. Increased Fatigue  8. Increased Shortness of Breath    INTERVENTIONS PLANNED: (Benefits and precautions of physical therapy have been discussed with the patient.)  1. Balance Exercise  2. Bed Mobility  3. Family Education  4. Gait Training  5. Neuromuscular Re-education/Strengthening  6. Therapeutic Activites  7. Therapeutic Exercise/Strengthening  8. Group Therapy      TREATMENT PLAN: Frequency/Duration: 3 times a week for duration of hospital stay  Rehabilitation Potential For Stated Goals: GOOD      RECOMMENDED REHABILITATION/EQUIPMENT: (at time of discharge pending progress): Continue Skilled Therapy and Rehab. HISTORY:   History of Present Injury/Illness (Reason for Referral):  See H&P below  Mr. Belem Arriaga is a 67 yo WM with PMH of LLE DVT s/p thrombolysis and anticoagulated with coumadin 3,2017, L2 compression fracture, neurogenic bladder requiring magana at SNF, recent [address] [address] UTI evaluated with acute onset of LLQ ABD pain/emesis. Denies rectal bleed/melena/fever/urine complaints. CT AP shows left 10 mm nephrolithiasis without obstruction/diverticulitis/constipation and UA positive for UTI. He has received levaquin/flagyl in ED. Past Medical History/Comorbidities:   Mr. Belem Arriaga  has a past medical history of Calculus of kidney; Diabetes ([facility] [address] and DVT (deep venous thrombosis) ([facility] [address] (2013).   Mr. Belem Arriaga  has a past surgical history that includes urological.  Social History/Living Environment:   Home Environment: Rehabilitation facility  # Steps to Enter: 0  One/Two Story Residence: One story  Living Alone: No  Support Systems: Family member(s), Spouse/Significant Other/Partner  Patient Expects to be Discharged to[de-identified] Rehabilitation facility  Current DME Used/Available at Home: CPAP  Tub or Shower Type: Shower  Prior Level of Function/Work/Activity:  Indep with gait and ADLs      Number of Personal Factors/Comorbidities that affect the Plan of Care: 1-2: MODERATE COMPLEXITY   EXAMINATION:   Most Recent Physical Functioning:   Gross Assessment:  AROM: Generally decreased, functional  Strength: Generally decreased, functional  Sensation: Intact               Posture:     Balance:  Standing - Static: Good;Constant support  Standing - Dynamic : Good Bed Mobility:     Wheelchair Mobility:     Transfers:  Sit to Stand: Minimum assistance (X2 attempts from low sitting chair)  Stand to Sit: Contact guard assistance  Gait:     Base of Support: Narrowed  Step Length: Left shortened;Right shortened  Gait Abnormalities: Path deviations  Distance (ft): 150 Feet (ft) (X1; 100 ft X1)  Assistive Device: Walker, rolling  Ambulation - Level of Assistance: Contact guard assistance  Interventions: Safety awareness training;Verbal cues; Tactile cues       Body Structures Involved:  1. Heart  2. Lungs  3. Bones  4. Joints  5. Muscles Body Functions Affected:  1. Sensory/Pain  2. Cardio  3. Respiratory  4. Neuromusculoskeletal  5. Movement Related Activities and Participation Affected:  1. General Tasks and Demands  2. Mobility  3. Self Care  4. Domestic Life  5. Interpersonal Interactions and Relationships  6. Community, Social and [place]   Number of elements that affect the Plan of Care: 4+: HIGH COMPLEXITY   CLINICAL PRESENTATION:   Presentation: Stable and uncomplicated: LOW COMPLEXITY   CLINICAL DECISION MAKIN [facility] Mobility Inpatient Short Form  How much difficulty does the patient currently have. .. Unable A Lot A Little None   1. Turning over in bed (including adjusting bedclothes, sheets and blankets)? [ ] 1   [ ] 2   [ ] 3   [X] 4   2. Sitting down on and standing up from a chair with arms ( e.g., wheelchair, bedside commode, etc.)   [ ] 1   [ ] 2   [X] 3   [ ] 4   3.   Moving from lying on back to sitting on the side of the bed? [ ] 1   [ ] 2   [X] 3   [ ] 4   How much help from another person does the patient currently need. .. Total A Lot A Little None   4. Moving to and from a bed to a chair (including a wheelchair)? [ ] 1   [ ] 2   [X] 3   [ ] 4   5. Need to walk in hospital room? [ ] 1   [ ] 2   [X] 3   [ ] 4   6. Climbing 3-5 steps with a railing? [ ] 1   [ ] 2   [X] 3   [ ] 4   © 2007, Trustees of [address] [place] [address] [address] [address], under license to [employer]. All rights reserved    Score:  Initial: 19 Most Recent: X (Date: -- )     Interpretation of Tool:  Represents activities that are increasingly more difficult (i.e. Bed mobility, Transfers, Gait). Score 24 23 22-20 19-15 14-10 9-7 6       Modifier CH CI CJ CK CL CM CN         · Mobility - Walking and Moving Around:               - CURRENT STATUS:    CK - 40%-59% impaired, limited or restricted               - GOAL STATUS:           CJ - 20%-39% impaired, limited or restricted               - D/C STATUS:                       ---------------To be determined---------------  [place] / [place] / Product Type: Medicare /       Medical Necessity:     · Patient is expected to demonstrate progress in strength, balance, coordination and functional technique to decrease assistance required with gait and functional mobility. Reason for Services/Other Comments:  · Patient continues to require skilled intervention due to decreased strength, decreased balance, decreased functional tolerance, decreased cardiopulmonary endurance affecting participation in basic ADLs and functional tasks. .   Use of outcome tool(s) and clinical judgement create a POC that gives a: Clear prediction of patient's progress: LOW COMPLEXITY                 TREATMENT:   (In addition to Assessment/Re-Assessment sessions the following treatments were rendered)   Pre-treatment Symptoms/Complaints:  No complaints at this time.   Pain: Initial:   Pain Intensity 1: 0  Post Session:  0/10      Therapeutic Activity: (    11 minutes): Therapeutic activities including Chair transfers and Ambulation on level ground to improve mobility, strength, balance and coordination. Required minimal Safety awareness training;Verbal cues; Tactile cues to promote static and dynamic balance in standing. Date:  5/4/17 Date:    Date:      Activity/Exercise Parameters Parameters Parameters   Seated LAQ 15 XB       Seated marches 15 X B       Supine ankle pumps 15 X B       Supine quad sets 15 XB       Supine glute set 15 X       Supine heel slides 15 XB       Supine hip abduction 15XB                Braces/Orthotics/Lines/Etc:   · IV  · magana catheter  · O2 Device: Room air  Treatment/Session Assessment:    · Response to Treatment:  Pt continues to be motivated to work with PT. More normalized gait pattern this morning. · Interdisciplinary Collaboration:  · Physical Therapist  · Registered Nurse  · After treatment position/precautions:  · Up in chair, Bed/Chair-wheels locked, Bed in low position, Call light within reach and RN notified  · Compliance with Program/Exercises: Will assess as treatment progresses. · Recommendations/Intent for next treatment session: \"Next visit will focus on advancements to more challenging activities and reduction in assistance provided\".   Total Treatment Duration:  PT Patient Time In/Time Out  Time In: 1140  Time Out: 54947 Khushi Manning

## 2017-05-05 NOTE — PROGRESS NOTES
Hospitalist Progress Note     Admit Date:  2017  1:17 AM   Name:  Shantanu Stevens   Age:  66 y.o.  :  1945   MRN:  [id number]   PCP:  Lucas Phillips MD  Treatment Team: Attending Provider: Jennyfer Theodore MD; Care Manager: Lucrecia Silva RN    Subjective: Tolerating po. No N/V. Only minimal LLQ tenderness    Objective:   Patient Vitals for the past 24 hrs:   Temp Pulse Resp BP SpO2   17 1511 98.9 °F (37.2 °C) 67 18 142/67 97 %   17 1154 98.7 °F (37.1 °C) 70 20 150/72 96 %   17 0755 98.3 °F (36.8 °C) 62 20 148/69 96 %   17 0318 97.4 °F (36.3 °C) 67 20 135/69 96 %   17 2325 98.4 °F (36.9 °C) 61 18 138/60 95 %   17 1925 99.3 °F (37.4 °C) 77 18 116/67 94 %     Oxygen Therapy  O2 Sat (%): 97 % (17 1511)  Pulse via Oximetry: 78 beats per minute (17 1205)  O2 Device: Room air (17 0653)    Intake/Output Summary (Last 24 hours) at 17 1715  Last data filed at 17 1242   Gross per 24 hour   Intake              270 ml   Output             1000 ml   Net             -730 ml         General:    Well nourished. Alert. CV:   RRR. No murmur, rub, or gallop. Lungs:   Clear to auscultation bilaterally. No wheezing, rhonchi, or rales. Abdomen:   Soft, minimal LLQ tenderness, nondistended. Bowel sounds normal. No rebound  Extremities: Warm and dry. No cyanosis or edema. Skin:     No rashes or jaundice.      Current Meds:  Current Facility-Administered Medications   Medication Dose Route Frequency    metroNIDAZOLE (FLAGYL) tablet 500 mg  500 mg Oral TID    famotidine (PEPCID) tablet 20 mg  20 mg Oral BID    polyethylene glycol (MIRALAX) packet 17 g  17 g Oral DAILY    Saccharomyces boulardii (FLORASTOR) capsule 250 mg  250 mg Oral BID    tamsulosin (FLOMAX) capsule 0.4 mg  0.4 mg Oral DAILY    zolpidem (AMBIEN) tablet 5 mg  5 mg Oral QHS PRN    sodium chloride (NS) flush 5-10 mL  5-10 mL IntraVENous Q8H    sodium chloride (NS) flush 5-10 mL  5-10 mL IntraVENous PRN    acetaminophen (TYLENOL) tablet 500 mg  500 mg Oral Q6H PRN    HYDROcodone-acetaminophen (NORCO) 7.5-325 mg per tablet 1 Tab  1 Tab Oral Q4H PRN    ondansetron (ZOFRAN) injection 4 mg  4 mg IntraVENous Q4H PRN    insulin lispro (HUMALOG) injection   SubCUTAneous AC&HS    warfarin (COUMADIN) tablet 6 mg (pharmacy dosing)  6 mg Oral QHS    losartan (COZAAR) tablet 100 mg  100 mg Oral DAILY    escitalopram oxalate (LEXAPRO) tablet 10 mg  10 mg Oral DAILY       Labs and Studies:  I have reviewed all labs, meds, telemetry events, and studies from the last 24 hours.   Recent Results (from the past 24 hour(s))   GLUCOSE, POC    Collection Time: 05/04/17  9:16 PM   Result Value Ref Range    Glucose (POC) 89 65 - 100 mg/dL   GLUCOSE, POC    Collection Time: 05/05/17  5:26 AM   Result Value Ref Range    Glucose (POC) 108 (H) 65 - 100 mg/dL   PROTHROMBIN TIME + INR    Collection Time: 05/05/17  7:51 AM   Result Value Ref Range    Prothrombin time 22.1 (H) 9.6 - 12.0 sec    INR 2.0 (H) 0.9 - 1.2     PLEASE READ & DOCUMENT PPD TEST IN 24 HRS    Collection Time: 05/05/17 10:08 AM   Result Value Ref Range    PPD  Negative    mm Induration  0 mm   GLUCOSE, POC    Collection Time: 05/05/17 11:25 AM   Result Value Ref Range    Glucose (POC) 105 (H) 65 - 100 mg/dL   GLUCOSE, POC    Collection Time: 05/05/17  4:03 PM   Result Value Ref Range    Glucose (POC) 114 (H) 65 - 100 mg/dL        All Micro Results     Procedure Component Value Units Date/Time    CULTURE, BLOOD [[id number]] Collected:  05/02/17 0850    Order Status:  Completed Specimen:  Blood from Blood Updated:  05/05/17 0650     Special Requests: RIGHT ANTECUBITAL        Culture result: NO GROWTH 3 DAYS       CULTURE, BLOOD [[id number]] Collected:  05/02/17 0905    Order Status:  Completed Specimen:  Blood from Blood Updated:  05/05/17 0650     Special Requests: RIGHT ANTECUBITAL        Culture result: NO GROWTH 3 DAYS       CULTURE, URINE [[id number]] Collected:  05/02/17 0240    Order Status:  Completed Specimen:  Urine from Clean catch Updated:  05/04/17 0707     Special Requests: NO SPECIAL REQUESTS        Culture result: NO GROWTH 2 DAYS       MRSA SCREEN - PCR (NASAL) [[id number]] Collected:  05/02/17 1520    Order Status:  Completed Specimen:  Nasal from Nares Updated:  05/02/17 1806     Special Requests: NO SPECIAL REQUESTS        Culture result:         MRSA target DNA is not detected (presumptive not colonized with MRSA)          Recent Imaging:  CXR Results  (Last 48 hours)    None        CT Results  (Last 48 hours)    None          Assessment and Plan:     Hospital Problems as of 5/5/2017  Date Reviewed: 3/21/2017          Codes Class Noted - Resolved POA    * (Principal)Catheter-associated urinary tract infection ([facility] ICD-10-CM: T83.511A, N39.0  ICD-9-CM: 996.64, 599.0  5/2/2017 - Present Yes        Neurogenic bladder (Chronic) ICD-10-CM: N31.9  ICD-9-CM: 596.54  5/2/2017 - Present Yes        Diverticulitis ICD-10-CM: K57.92  ICD-9-CM: 562.11  5/2/2017 - Present Yes        Chronic anticoagulation (Chronic) ICD-10-CM: Z79.01  ICD-9-CM: V58.61  5/2/2017 - Present Yes        DVT (deep venous thrombosis) ([facility] (Chronic) ICD-10-CM: I82.409  ICD-9-CM: 453.40  5/2/2017 - Present Yes        Nephrolithiasis (Chronic) ICD-10-CM: N20.0  ICD-9-CM: 592.0  5/2/2017 - Present Yes        Benign essential HTN ICD-10-CM: I10  ICD-9-CM: 401.1  9/13/2016 - Present Yes        Type 2 diabetes mellitus without complication ([facility] [id number]: E11.9  ICD-9-CM: 250.00  7/22/2016 - Present Yes        Obstructive sleep apnea syndrome ICD-10-CM: G47.33  ICD-9-CM: 327.23  7/22/2016 - Present Yes    Overview Signed 3/21/2017 10:40 PM by Medina Bustillo., PA     Home CPAP             Morbid obesity due to excess calories [facility]) ICD-10-CM: E66.01  ICD-9-CM: 278.01  7/22/2016 - Present Yes                PLAN:    · Change abx to po  · Plan d/c back to [facility] in am for rehab  · Low residue diet    DC planning:  Discussed with case mgmt today        Signed:  Norbert Joseph MD

## 2017-05-05 NOTE — PROGRESS NOTES
Warfarin dosing per pharmacist    Shon Avila is a 66 y.o. male. Height: 5' 6\" (167.6 cm)    Weight: 126.1 kg (278 lb)    Indication:  Hx of LLE DVT (3/2017)    Goal INR:  2-3    Home dose:  6 mg qhs     Risk factors or significant drug interactions:  metronidazole    Other anticoagulants:  none    Daily Monitoring  Date  INR     Warfarin dose HGB              Notes  5/2  1.9  6 mg  14.2  5/3  2.0  6 mg  12.3  5/4  2.1  6 mg  12.1  5/5  2.0  6 mg  ---    Pharmacy consulted to dose warfarin on 5/2/17. INR stable at  2.0. Today. Continue 6 mg qhs. Daily INR. Pharmacy will continue to follow. Please call with any questions.     Thank you,  Maribeth Gottron, PharmD, BCPS  Clinical Pharmacist  [phone]

## 2017-05-05 NOTE — PROGRESS NOTES
Problem: Self Care Deficits Care Plan (Adult)  Goal: *Acute Goals and Plan of Care (Insert Text)  GOALS:    1: Pt will perform toileting with modified independence and adaptive equipment as needed to prevent skin breakdown. 2: Pt will perform LB dressing with setup and adaptive equipment as needed to reduce risk of falls. 3: Pt will perform bathing with setup and adaptive equipment as needed to promote good skin integrity. 4: Pt will perform toilet transfers with setup and the least restrictive device to promote quality of life. 5: Pt will tolerate 30 minutes of therapeutic activity with 2 rest breaks. Time Frame: 7 visits      OCCUPATIONAL THERAPY: Initial Assessment, Daily Note and Treatment Day: 1st 5/5/2017  INPATIENT: Hospital Day: 4  [place] / [place] / Product Type: Medicare /      NAME/AGE/GENDER: Amandeep Up is a 66 y.o. male            PRIMARY DIAGNOSIS:  Sepsis ([facility] [address] Catheter-associated urinary tract infection ([facility] [address] Catheter-associated urinary tract infection ([facility] [address]        ICD-10: Treatment Diagnosis:        · Generalized Muscle Weakness (M62.81)  · Other lack of cordination (R27.8)   Precautions/Allergies:         Sulfite       ASSESSMENT:      Mr. Estanislao Goodpasture presents sitting in chair, agreeable to therapy. Pt from rehab where he was making progress from last hospital stay, presents with decreased activity tolerance, and decreased independence for self care, functional mobility, and ADL's. At [facility], pt lives with wife in 2 story home and is independent with self care. Currently, he requires minimal to moderate assistance for complex ADLs. Mr. Estanislao Goodpasture requires skilled OT to maximize independence with self care tasks and functional transfers. Pt plans for return to rehab. Pt returned to chair with all needs in reach, pt instructed to call for assistance; RN aware.         This section established at most recent assessment   PROBLEM LIST (Impairments causing functional limitations):  1. Decreased Strength  2. Decreased ADL/Functional Activities  3. Decreased Transfer Abilities  4. Decreased Ambulation Ability/Technique  5. Decreased Activity Tolerance  6. Decreased Work Simplification/Energy Conservation Techniques  7. Increased Fatigue  8. Decreased [place] with Home Exercise Program    INTERVENTIONS PLANNED: (Benefits and precautions of occupational therapy have been discussed with the patient.)  1. Activities of daily living training  2. Adaptive equipment training  3. Balance training  4. Clothing management  5. Donning&doffing training  6. Group therapy  7. Theraputic activity  8. Theraputic exercise         TREATMENT PLAN: Frequency/Duration: Follow patient 3x/week to address above goals. Rehabilitation Potential For Stated Goals: GOOD      RECOMMENDED REHABILITATION/EQUIPMENT: (at time of discharge pending progress): Continue Skilled Therapy and Rehab. OCCUPATIONAL PROFILE AND HISTORY:   History of Present Injury/Illness (Reason for Referral):  Mr. Kim Vance is a 65 yo WM with PMH of LLE DVT s/p thrombolysis and anticoagulated with coumadin 3,2017, L2 compression fracture, neurogenic bladder requiring magana at [facility], recent [address] [address] UTI evaluated with acute onset of LLQ ABD pain/emesis. Denies rectal bleed/melena/fever/urine complaints. CT AP shows left 10 mm nephrolithiasis without obstruction/diverticulitis/constipation and UA positive for UTI. He has received levaquin/flagyl in ED. ROS positive for weight loss , depression  Past Medical History/Comorbidities:   Mr. Kim Vance  has a past medical history of Calculus of kidney; Diabetes ([facility] [address] and DVT (deep venous thrombosis) ([facility] [address] (2013).   Mr. Kim Vance  has a past surgical history that includes urological.  Social History/Living Environment:   Home Environment: Rehabilitation facility  # Steps to Enter: 0  One/Two Story Residence: One story  Living Alone: No  Support Systems: Family member(s), Spouse/Significant Other/Partner  Patient Expects to be Discharged to[de-identified] Rehabilitation facility  Current DME Used/Available at Home: CPAP  Tub or Shower Type: Shower  Prior Level of Function/Work/Activity:  From rehab      Number of Personal Factors/Comorbidities that affect the Plan of Care: Expanded review of therapy/medical records (1-2):  MODERATE COMPLEXITY   ASSESSMENT OF OCCUPATIONAL PERFORMANCE[de-identified]   Activities of Daily Living:           Basic ADLs (From Assessment) Complex ADLs (From Assessment)   Basic ADL  Feeding: Independent  Oral Facial Hygiene/Grooming: Modified Independent  Bathing: Minimum assistance  Upper Body Dressing: Modified independent  Lower Body Dressing: Moderate assistance  Toileting: Contact guard assistance     Grooming/Bathing/Dressing Activities of Daily Living     Cognitive Retraining  Safety/Judgement: Fall prevention                       Bed/Mat Mobility  Sit to Stand: Minimum assistance  Bed to Chair: Minimum assistance          Most Recent Physical Functioning:   Gross Assessment:  AROM: Within functional limits  Strength: Generally decreased, functional               Posture:     Balance:  Sitting: Intact  Standing: Impaired; With support  Standing - Static: Good  Standing - Dynamic : Fair Bed Mobility:     Wheelchair Mobility:     Transfers:  Sit to Stand: Minimum assistance  Stand to Sit: Minimum assistance  Bed to Chair: Minimum assistance                 Patient Vitals for the past 6 hrs:       BP SpO2 Pulse   05/05/17 1154 150/72 96 % 70   05/05/17 1511 142/67 97 % 67        Mental Status  Neurologic State: Alert  Orientation Level: Oriented to person, Oriented to place  Cognition: Follows commands  Perception: Appears intact  Perseveration: No perseveration noted  Safety/Judgement: Fall prevention                               Physical Skills Involved:  1. Balance  2. Mobility  3. Strength  4.  Endurance Cognitive Skills Affected (resulting in the inability to perform in a timely and safe manner):  1. WFLs Psychosocial Skills Affected:  1. Routines and Behaviors  2. Active Use of Coping Strategies  3. Environmental Adaptations   Number of elements that affect the Plan of Care: 3-5:  MODERATE COMPLEXITY   CLINICAL DECISION MAKIN[address] [place] [address] AM-PAC 6 Clicks   Basic Mobility Inpatient Short Form  How much help from another person does the patient currently need. .. Total A Lot A Little None   1. Putting on and taking off regular lower body clothing?   [ ] 1   [X] 2   [ ] 3   [ ] 4   2. Bathing (including washing, rinsing, drying)? [ ] 1   [X] 2   [ ] 3   [ ] 4   3. Toileting, which includes using toilet, bedpan or urinal?   [ ] 1   [ ] 2   [X] 3   [ ] 4   4. Putting on and taking off regular upper body clothing?   [ ] 1   [ ] 2   [ ] 3   [X] 4   5. Taking care of personal grooming such as brushing teeth? [ ] 1   [ ] 2   [ ] 3   [X] 4   6. Eating meals? [ ] 1   [ ] 2   [ ] 3   [X] 4   © , Trustees of [address] [place] [address], under license to [employer]. All rights reserved    Score:  Initial: 19 Most Recent: X (Date: -- )     Interpretation of Tool:  Represents activities that are increasingly more difficult (i.e. Bed mobility, Transfers, Gait). Score 24 23 22-20 19-15 14-10 9-7 6       Modifier CH CI CJ CK CL CM CN         · Self Care:               - CURRENT STATUS:    CK - 40%-59% impaired, limited or restricted               - GOAL STATUS:           CJ - 20%-39% impaired, limited or restricted               - D/C STATUS:                       ---------------To be determined---------------  [place] / [place] / Product Type: Medicare /       Medical Necessity:     · Patient demonstrates good rehab potential due to higher previous functional level.   Reason for Services/Other Comments:  · Patient continues to require skilled intervention due to medical complications and patient unable to attend/participate in therapy as expected. Use of outcome tool(s) and clinical judgement create a POC that gives a: MODERATE COMPLEXITY             TREATMENT:   (In addition to Assessment/Re-Assessment sessions the following treatments were rendered)      Pre-treatment Symptoms/Complaints:  Decreased ability to perform ADLs, self care, and functional mobility; decreased tolerance for activities  Pain: Initial:   Pain Intensity 1: 0  Post Session:  0         Therapeutic Activity: (    10 minutes): Therapeutic activities including Chair transfers to improve mobility, strength and balance. Required minimal Safety awareness training;Verbal cues; Tactile cues to promote dynamic balance in standing. Assessment     Braces/Orthotics/Lines/Etc:   · O2 Device: Room air  Treatment/Session Assessment:    · Response to Treatment:  Agrees to therapy  · Interdisciplinary Collaboration:  · Occupational Therapist  · Registered Nurse  · After treatment position/precautions:  · Up in chair  · Bed/Chair-wheels locked  · Bed in low position  · Call light within reach  · RN notified  · Compliance with Program/Exercises: Will assess as treatment progresses. · Recommendations/Intent for next treatment session: \"Next visit will focus on advancements to more challenging activities and reduction in assistance provided\".   Total Treatment Duration: 24 minutes  OT Patient Time In/Time Out  Time In: 1314  Time Out: 920 [facility]  N, OTR/L

## 2017-05-05 NOTE — PROGRESS NOTES
Spoke with Bina Joseph at [facility]. They have a bed available today if patient is medically ready for discharge. Case Management will continue to follow.

## 2017-05-05 NOTE — PROGRESS NOTES
Mr. Gilliland Aid resting in chair watching TV. Worked with therapy today and walked hallway. Good appetite. Asking when he can be discharged. Without needs or complaints.  Call light in lap and door closed per request.

## 2017-05-06 VITALS
OXYGEN SATURATION: 98 % | HEART RATE: 69 BPM | WEIGHT: 278 LBS | TEMPERATURE: 98.1 F | DIASTOLIC BLOOD PRESSURE: 66 MMHG | HEIGHT: 66 IN | RESPIRATION RATE: 20 BRPM | BODY MASS INDEX: 44.68 KG/M2 | SYSTOLIC BLOOD PRESSURE: 146 MMHG

## 2017-05-06 LAB
GLUCOSE BLD STRIP.AUTO-MCNC: 105 MG/DL (ref 65–100)
INR PPP: 2.2 (ref 0.9–1.2)
MM INDURATION POC: 0 MM (ref 0–5)
PPD POC: NEGATIVE NEGATIVE
PROTHROMBIN TIME: 23.8 SEC (ref 9.6–12)

## 2017-05-06 PROCEDURE — 74011250637 HC RX REV CODE- 250/637: Performed by: INTERNAL MEDICINE

## 2017-05-06 PROCEDURE — 36415 COLL VENOUS BLD VENIPUNCTURE: CPT | Performed by: INTERNAL MEDICINE

## 2017-05-06 PROCEDURE — 82962 GLUCOSE BLOOD TEST: CPT

## 2017-05-06 PROCEDURE — 74011250637 HC RX REV CODE- 250/637: Performed by: HOSPITALIST

## 2017-05-06 PROCEDURE — 85610 PROTHROMBIN TIME: CPT | Performed by: INTERNAL MEDICINE

## 2017-05-06 RX ORDER — WARFARIN 6 MG/1
6 TABLET ORAL
Qty: 30 TAB | Refills: 0 | Status: SHIPPED | OUTPATIENT
Start: 2017-05-06 | End: 2017-05-22 | Stop reason: SDUPTHER

## 2017-05-06 RX ORDER — CIPROFLOXACIN 500 MG/1
500 TABLET ORAL 2 TIMES DAILY
Qty: 12 TAB | Refills: 0 | Status: SHIPPED | OUTPATIENT
Start: 2017-05-06 | End: 2017-05-12

## 2017-05-06 RX ORDER — SAME BUTANEDISULFONATE/BETAINE 400-600 MG
250 POWDER IN PACKET (EA) ORAL 2 TIMES DAILY
Qty: 14 CAP | Refills: 0 | Status: SHIPPED | OUTPATIENT
Start: 2017-05-06 | End: 2017-05-13

## 2017-05-06 RX ORDER — TAMSULOSIN HYDROCHLORIDE 0.4 MG/1
0.4 CAPSULE ORAL DAILY
Qty: 30 CAP | Refills: 0 | Status: SHIPPED | OUTPATIENT
Start: 2017-05-06 | End: 2017-05-22 | Stop reason: SDUPTHER

## 2017-05-06 RX ORDER — FAMOTIDINE 20 MG/1
20 TABLET, FILM COATED ORAL 2 TIMES DAILY
Qty: 60 TAB | Refills: 0 | Status: SHIPPED | OUTPATIENT
Start: 2017-05-06 | End: 2017-05-22

## 2017-05-06 RX ORDER — METRONIDAZOLE 500 MG/1
500 TABLET ORAL 3 TIMES DAILY
Qty: 18 TAB | Refills: 0 | Status: SHIPPED | OUTPATIENT
Start: 2017-05-06 | End: 2017-05-22

## 2017-05-06 RX ADMIN — METRONIDAZOLE 500 MG: 500 TABLET ORAL at 09:46

## 2017-05-06 RX ADMIN — TAMSULOSIN HYDROCHLORIDE 0.4 MG: 0.4 CAPSULE ORAL at 09:46

## 2017-05-06 RX ADMIN — Medication 10 ML: at 06:11

## 2017-05-06 RX ADMIN — LOSARTAN POTASSIUM 100 MG: 50 TABLET ORAL at 09:45

## 2017-05-06 RX ADMIN — ESCITALOPRAM OXALATE 10 MG: 10 TABLET ORAL at 09:46

## 2017-05-06 RX ADMIN — FAMOTIDINE 20 MG: 20 TABLET ORAL at 09:45

## 2017-05-06 RX ADMIN — RDII 250 MG CAPSULE 250 MG: at 09:45

## 2017-05-06 NOTE — DISCHARGE INSTRUCTIONS
DISCHARGE SUMMARY from Nurse    The following personal items are in your possession at time of discharge:    Dental Appliances: None  Visual Aid: None     Home Medications: None  Jewelry: None  Clothing: None  Other Valuables: None             PATIENT INSTRUCTIONS:    After general anesthesia or intravenous sedation, for 24 hours or while taking prescription Narcotics:  · Limit your activities  · Do not drive and operate hazardous machinery  · Do not make important personal or business decisions  · Do  not drink alcoholic beverages  · If you have not urinated within 8 hours after discharge, please contact your surgeon on call. Report the following to your surgeon:  · Excessive pain, swelling, redness or odor of or around the surgical area  · Temperature over 100.5  · Nausea and vomiting lasting longer than 4 hours or if unable to take medications  · Any signs of decreased circulation or nerve impairment to extremity: change in color, persistent  numbness, tingling, coldness or increase pain  · Any questions        What to do at Home:  Recommended activity: Activity as tolerated,     If you experience any of the following symptoms fever above 100.5, abdominal/pelvic pain, painful urination, please follow up with Dr. Barbara Martínez at [phone]. *  Please give a list of your current medications to your Primary Care Provider. *  Please update this list whenever your medications are discontinued, doses are      changed, or new medications (including over-the-counter products) are added. *  Please carry medication information at all times in case of emergency situations. These are general instructions for a healthy lifestyle:    No smoking/ No tobacco products/ Avoid exposure to second hand smoke    Surgeon General's Warning:  Quitting smoking now greatly reduces serious risk to your health.     Obesity, smoking, and sedentary lifestyle greatly increases your risk for illness    A healthy diet, regular physical exercise & weight monitoring are important for maintaining a healthy lifestyle    You may be retaining fluid if you have a history of heart failure or if you experience any of the following symptoms:  Weight gain of 3 pounds or more overnight or 5 pounds in a week, increased swelling in our hands or feet or shortness of breath while lying flat in bed. Please call your doctor as soon as you notice any of these symptoms; do not wait until your next office visit. Recognize signs and symptoms of STROKE:    F-face looks uneven    A-arms unable to move or move unevenly    S-speech slurred or non-existent    T-time-call 911 as soon as signs and symptoms begin-DO NOT go       Back to bed or wait to see if you get better-TIME IS BRAIN. Warning Signs of HEART ATTACK     Call 911 if you have these symptoms:   Chest discomfort. Most heart attacks involve discomfort in the center of the chest that lasts more than a few minutes, or that goes away and comes back. It can feel like uncomfortable pressure, squeezing, fullness, or pain.  Discomfort in other areas of the upper body. Symptoms can include pain or discomfort in one or both arms, the back, neck, jaw, or stomach.  Shortness of breath with or without chest discomfort.  Other signs may include breaking out in a cold sweat, nausea, or lightheadedness. Don't wait more than five minutes to call 911 - MINUTES MATTER! Fast action can save your life. Calling 911 is almost always the fastest way to get lifesaving treatment. Emergency Medical Services staff can begin treatment when they arrive -- up to an hour sooner than if someone gets to the hospital by car. The discharge information has been reviewed with the patient and spouse. The patient and spouse verbalized understanding. Discharge medications reviewed with the patient and spouse and appropriate educational materials and side effects teaching were provided.           Please schedule appointment with MD in 3 to 5 days at discharge, sepsis book and thermometer for home use. IF UNABLE PLEASE ADVISE SEPSIS NAVIGATOR OR                      Diverticulitis: Care Instructions  Your Care Instructions    Diverticulitis occurs when pouches form in the wall of the colon and become inflamed or infected. It can be very painful. Doctors aren't sure what causes diverticulitis. There is no proof that foods such as nuts, seeds, or berries cause it or make it worse. A low-fiber diet may cause the colon to work harder to push stool forward. Pouches may form because of this extra work. It may be hard to think about healthy eating while you're in pain. But as you recover, you might think about how you can use healthy eating for overall better health. Healthy eating may help you avoid future attacks. Follow-up care is a key part of your treatment and safety. Be sure to make and go to all appointments, and call your doctor if you are having problems. It's also a good idea to know your test results and keep a list of the medicines you take. How can you care for yourself at home? · Drink plenty of fluids, enough so that your urine is light yellow or clear like water. If you have kidney, heart, or liver disease and have to limit fluids, talk with your doctor before you increase the amount of fluids you drink. · Stick to liquids or a bland diet (plain rice, bananas, dry toast or crackers, applesauce) until you are feeling better. Then you can return to regular foods and gradually increase the amount of fiber in your diet. · Use a heating pad set on low on your belly to relieve mild cramps and pain. · Get extra rest until you are feeling better. · Be safe with medicines. Read and follow all instructions on the label. ¨ If the doctor gave you a prescription medicine for pain, take it as prescribed.   ¨ If you are not taking a prescription pain medicine, ask your doctor if you can take an over-the-counter medicine. · If your doctor prescribed antibiotics, take them as directed. Do not stop taking them just because you feel better. You need to take the full course of antibiotics. To prevent future attacks of diverticulitis  · Avoid constipation:  ¨ Include fruits, vegetables, beans, and whole grains in your diet each day. These foods are high in fiber. ¨ Drink plenty of fluids, enough so that your urine is light yellow or clear like water. If you have kidney, heart, or liver disease and have to limit fluids, talk with your doctor before you increase the amount of fluids you drink. ¨ Get some exercise every day. Build up slowly to 30 to 60 minutes a day on 5 or more days of the week. ¨ Take a fiber supplement, such as Citrucel or Metamucil, every day if needed. Read and follow all instructions on the label. ¨ Schedule time each day for a bowel movement. Having a daily routine may help. Take your time and do not strain when having a bowel movement. When should you call for help? Call 911 anytime you think you may need emergency care. For example, call if:  · You passed out (lost consciousness). · You vomit blood or what looks like coffee grounds. · You pass maroon or very bloody stools. Call your doctor now or seek immediate medical care if:  · You have severe pain or swelling in your belly. · You have a new or higher fever. · You cannot keep down fluids or medicines. · You have new pain that gets worse when you move or cough. Watch closely for changes in your health, and be sure to contact your doctor if:  · The symptoms you had when you first started feeling sick come back. · You do not get better as expected. Where can you learn more? Go to http://yelitza-julia.info/. Enter H901 in the search box to learn more about \"Diverticulitis: Care Instructions. \"  Current as of: August 9, 2016  Content Version: 11.2  © 7954-2915 [employer], e|tab.  Care instructions adapted under license by [employer] (which disclaims liability or warranty for this information). If you have questions about a medical condition or this instruction, always ask your healthcare professional. [address] any warranty or liability for your use of this information.

## 2017-05-06 NOTE — PROGRESS NOTES
Pt D/C with spouse to transfer to [facility] for rehab. Packet sent with spouse for facility.

## 2017-05-06 NOTE — PROGRESS NOTES
Escorted per Elder Esquivel CNA in [facility] to discharge area to waiting vehicle.

## 2017-05-06 NOTE — PROGRESS NOTES
Discharge instructions and prescriptions x6 given. Opportunity given for questions, verbalized understanding. [place] collection bag changed into leg bag.

## 2017-05-06 NOTE — DISCHARGE SUMMARY
Hospitalist Discharge Summary     Admit Date:  2017  1:17 AM   Name:  Raoul Lee   Age:  66 y.o.  :  1945   MRN:  [id number]   PCP:  Lo Briggs MD  Treatment Team: Attending Provider: Hubert Suárez MD; Care Manager: Kyler So RN    Problem List for this Hospitalization:  Hospital Problems as of 2017  Date Reviewed: 3/21/2017          Codes Class Noted - Resolved POA    * (Principal)Catheter-associated urinary tract infection ([facility] ICD-10-CM: T83.511A, N39.0  ICD-9-CM: 996.64, 599.0  2017 - Present Yes        Neurogenic bladder (Chronic) ICD-10-CM: N31.9  ICD-9-CM: 596.54  2017 - Present Yes        Diverticulitis ICD-10-CM: K57.92  ICD-9-CM: 562.11  2017 - Present Yes        Chronic anticoagulation (Chronic) ICD-10-CM: Z79.01  ICD-9-CM: V58.61  2017 - Present Yes        DVT (deep venous thrombosis) ([facility] (Chronic) ICD-10-CM: I82.409  ICD-9-CM: 453.40  2017 - Present Yes        Nephrolithiasis (Chronic) ICD-10-CM: N20.0  ICD-9-CM: 592.0  2017 - Present Yes        Benign essential HTN ICD-10-CM: I10  ICD-9-CM: 401.1  2016 - Present Yes        Type 2 diabetes mellitus without complication ([facility] LFM-36-DY: E11.9  ICD-9-CM: 250.00  2016 - Present Yes        Obstructive sleep apnea syndrome ICD-10-CM: G47.33  ICD-9-CM: 327.23  2016 - Present Yes    Overview Signed 3/21/2017 10:40 PM by BIJAN Tan     Home CPAP             Morbid obesity due to excess calories [facility]) ICD-10-CM: E66.01  ICD-9-CM: 278.01  2016 - Present Yes                Admission HPI from 2017:    65 y/o gentleman presents with LLQ pain with CT evidence c/w diverticulitis of sigmoid/descending colon junction. Started on flagyl and levaquin. Pt also has hx LLE DVT s/p thrombolysis and anticoagulated with coumadin. Recent L2 compression fracture with neurogenic bladder requiring indwelling magana catheter. Catheter exchanged on this admission. Hospital Course:  Pt's abdominal pain subsided with abx therapy and diet was advanced. Transitioned to oral medications which he tolerated. VSS at discharge and abdominal exam was benign. INR 2.2 at d/c    Follow up instructions below. Plan was discussed with pt's wife via telephone call. All questions answered. Patient was stable at time of discharge and was instructed to call or return if there are any concerns or recurrence of symptoms. Diagnostic Imaging/Tests:        All Micro Results     Procedure Component Value Units Date/Time    CULTURE, BLOOD [[id number]] Collected:  05/02/17 0850    Order Status:  Completed Specimen:  Blood from Blood Updated:  05/06/17 0655     Special Requests: RIGHT ANTECUBITAL        Culture result: NO GROWTH 4 DAYS       CULTURE, BLOOD [[id number]] Collected:  05/02/17 0905    Order Status:  Completed Specimen:  Blood from Blood Updated:  05/06/17 0655     Special Requests: RIGHT ANTECUBITAL        Culture result: NO GROWTH 4 DAYS       CULTURE, URINE [[id number]] Collected:  05/02/17 0240    Order Status:  Completed Specimen:  Urine from Clean catch Updated:  05/04/17 0707     Special Requests: NO SPECIAL REQUESTS        Culture result: NO GROWTH 2 DAYS       MRSA SCREEN - PCR (NASAL) [[id number]] Collected:  05/02/17 1520    Order Status:  Completed Specimen:  Nasal from Nares Updated:  05/02/17 1806     Special Requests: NO SPECIAL REQUESTS        Culture result:         MRSA target DNA is not detected (presumptive not colonized with MRSA)          Labs: Results:       BMP, Mg, Phos Recent Labs      05/04/17   0520   NA  143   K  3.6   CL  106   CO2  28   AGAP  9   BUN  11   CREA  0.80   CA  8.7   GLU  103*      CBC Recent Labs      05/04/17   0520   WBC  7.8   RBC  4.02*   HGB  12.1*   HCT  37.3*   PLT  212   GRANS  71   LYMPH  19   EOS  2   MONOS  8   BASOS  0   IG  0.3   ANEU  5.5   ABL  1.5   ANNIE  0.2   ABM  0.6   ABB  0.0   AIG  0.0      LFT No results for input(s): SGOT, ALT, TBIL, AP, TP, ALB, GLOB, AGRAT, GPT in the last 72 hours.    Cardiac Testing Lab Results   Component Value Date/Time    B-type Natriuretic Peptide 91.4 03/21/2017 01:33 PM      Coagulation Tests Lab Results   Component Value Date/Time    Prothrombin time 23.8 05/06/2017 05:57 AM    Prothrombin time 22.1 05/05/2017 07:51 AM    Prothrombin time 23.2 05/04/2017 05:20 AM    INR 2.2 05/06/2017 05:57 AM    INR 2.0 05/05/2017 07:51 AM    INR 2.1 05/04/2017 05:20 AM    aPTT 69.2 03/29/2017 06:08 AM    aPTT >110.0 03/28/2017 07:35 AM    aPTT 89.9 03/27/2017 11:55 PM      A1c No results found for: HBA1C, HGBE8, PDU6SUAI   Lipid Panel Lab Results   Component Value Date/Time    Cholesterol, total 144 08/02/2016 09:28 AM    HDL Cholesterol 27 08/02/2016 09:28 AM    LDL, calculated 42 08/02/2016 09:28 AM    VLDL, calculated 75 08/02/2016 09:28 AM    Triglyceride 377 08/02/2016 09:28 AM      Thyroid Panel Lab Results   Component Value Date/Time    T4, Total 4.8 08/02/2016 09:28 AM    TSH 1.720 08/02/2016 09:28 AM        Most Recent UA Lab Results   Component Value Date/Time    Color YELLOW 05/02/2017 02:40 AM    Appearance CLOUDY 05/02/2017 02:40 AM    Specific gravity 1.022 05/02/2017 02:40 AM    pH (UA) 6.0 05/02/2017 02:40 AM    Protein 100 05/02/2017 02:40 AM    Glucose NEGATIVE  05/02/2017 02:40 AM    Ketone TRACE 05/02/2017 02:40 AM    Bilirubin NEGATIVE  05/02/2017 02:40 AM    Blood LARGE 05/02/2017 02:40 AM    Urobilinogen 0.2 05/02/2017 02:40 AM    Nitrites NEGATIVE  05/02/2017 02:40 AM    Leukocyte Esterase SMALL 05/02/2017 02:40 AM        Allergies   Allergen Reactions    Sulfite Hives     Immunization History   Administered Date(s) Administered    Influenza Vaccine (Quad) PF 11/16/2016    Pneumococcal Polysaccharide (PPSV-23) 09/13/2016    TB Skin Test (PPD) Intradermal 03/24/2017, 05/04/2017       All Labs from Last 24 Hrs:  Recent Results (from the past 24 hour(s))   PLEASE READ & DOCUMENT PPD TEST IN 24 HRS    Collection Time: 05/05/17 10:08 AM   Result Value Ref Range    PPD  Negative    mm Induration  0 mm   GLUCOSE, POC    Collection Time: 05/05/17 11:25 AM   Result Value Ref Range    Glucose (POC) 105 (H) 65 - 100 mg/dL   GLUCOSE, POC    Collection Time: 05/05/17  4:03 PM   Result Value Ref Range    Glucose (POC) 114 (H) 65 - 100 mg/dL   GLUCOSE, POC    Collection Time: 05/05/17  9:00 PM   Result Value Ref Range    Glucose (POC) 146 (H) 65 - 100 mg/dL   GLUCOSE, POC    Collection Time: 05/06/17  5:38 AM   Result Value Ref Range    Glucose (POC) 105 (H) 65 - 100 mg/dL   PROTHROMBIN TIME + INR    Collection Time: 05/06/17  5:57 AM   Result Value Ref Range    Prothrombin time 23.8 (H) 9.6 - 12.0 sec    INR 2.2 (H) 0.9 - 1.2         Discharge Exam:  Patient Vitals for the past 24 hrs:   Temp Pulse Resp BP SpO2   05/06/17 0652 98.1 °F (36.7 °C) 69 20 146/66 98 %   05/06/17 0354 97.6 °F (36.4 °C) 65 19 138/78 96 %   05/05/17 2346 97.8 °F (36.6 °C) 69 20 102/59 98 %   05/05/17 1951 99.1 °F (37.3 °C) 65 19 121/67 97 %   05/05/17 1511 98.9 °F (37.2 °C) 67 18 142/67 97 %   05/05/17 1154 98.7 °F (37.1 °C) 70 20 150/72 96 %     Oxygen Therapy  O2 Sat (%): 98 % (05/06/17 0652)  Pulse via Oximetry: 78 beats per minute (05/02/17 1205)  O2 Device: Room air (05/05/17 1907)    Intake/Output Summary (Last 24 hours) at 05/06/17 0936  Last data filed at 05/06/17 0359   Gross per 24 hour   Intake              120 ml   Output              525 ml   Net             -405 ml       General:    Well nourished. Alert. No distress. Eyes:   Normal sclera. Extraocular movements intact. ENT:  Normocephalic, atraumatic. Moist mucous membranes  CV:   Regular rate and rhythm. No murmur, rub, or gallop. Lungs:  Clear to auscultation bilaterally. No wheezing, rhonchi, or rales. Abdomen: Soft, nontender, nondistended. Bowel sounds normal.   Extremities: Warm and dry. No cyanosis or edema. Neurologic: CN II-XII grossly intact. Sensation intact. Skin:     No rashes or jaundice. No wounds. Psych:  Normal mood and affect. Discharge Info:   Current Discharge Medication List      START taking these medications    Details   !! metroNIDAZOLE (FLAGYL) 500 mg tablet Take 1 Tab by mouth three (3) times daily. Qty: 18 Tab, Refills: 0      !! warfarin (COUMADIN) 6 mg tablet Take 1 Tab by mouth nightly. Qty: 30 Tab, Refills: 0      !! metroNIDAZOLE (FLAGYL) 500 mg tablet Take 1 Tab by mouth three (3) times daily for 10 days. Qty: 30 Tab, Refills: 0      levoFLOXacin (LEVAQUIN) 750 mg tablet Take 1 Tab by mouth daily for 10 days. Qty: 10 Tab, Refills: 0      ondansetron (ZOFRAN ODT) 4 mg disintegrating tablet Take 1 Tab by mouth every eight (8) hours as needed for Nausea. Qty: 20 Tab, Refills: 0       !! - Potential duplicate medications found. Please discuss with provider. CONTINUE these medications which have CHANGED    Details   ciprofloxacin HCl (CIPRO) 500 mg tablet Take 1 Tab by mouth two (2) times a day for 6 days. Qty: 12 Tab, Refills: 0      !! famotidine (PEPCID) 20 mg tablet Take 1 Tab by mouth two (2) times a day. Qty: 60 Tab, Refills: 0      Saccharomyces boulardii (FLORASTOR) 250 mg capsule Take 1 Cap by mouth two (2) times a day for 7 days. Qty: 14 Cap, Refills: 0      tamsulosin (FLOMAX) 0.4 mg capsule Take 1 Cap by mouth daily. Qty: 30 Cap, Refills: 0       !! - Potential duplicate medications found. Please discuss with provider. CONTINUE these medications which have NOT CHANGED    Details   cyanocobalamin (VITAMIN B-12) 1,000 mcg tablet Take 1,000 mcg by mouth daily. losartan (COZAAR) 100 mg tablet Take 100 mg by mouth daily. escitalopram oxalate (LEXAPRO) 10 mg tablet Take 10 mg by mouth daily. multivitamin (ONE A DAY) tablet Take 1 Tab by mouth daily. ergocalciferol (ERGOCALCIFEROL) 50,000 unit capsule Take 50,000 Units by mouth every seven (7) days.       ferrous sulfate 325 mg (65 mg iron) tablet Take  by mouth Daily (before breakfast). !! warfarin (COUMADIN) 6 mg tablet Take 6 mg by mouth daily. lactulose (CHRONULAC) 10 gram/15 mL solution Take 20 g by mouth two (2) times a day. !! famotidine (PEPCID) 20 mg tablet Take 20 mg by mouth daily. glucose blood VI test strips (ACCU-CHEK [place] PLUS TEST STRP) strip Check BS Once Daily  Dx E11.9  Qty: 50 Strip, Refills: 4      Lancets (ACCU-CHEK FASTCLIX) misc Check BS Once Daily  DX E11.9  Qty: 100 Each, Refills: 1       !! - Potential duplicate medications found. Please discuss with provider. STOP taking these medications       phenazopyridine (PYRIDIUM) 200 mg tablet Comments:   Reason for Stopping:         polyethylene glycol (MIRALAX) 17 gram packet Comments:   Reason for Stopping:         zolpidem (AMBIEN) 5 mg tablet Comments:   Reason for Stopping:                 Disposition: to rehab facility. Continue PT/OT  Activity: as tolerated  Diet: low residue diverticular diet, low vitamin K (pt on coumadin)    Follow-up Information     Follow up With Details Comments Contact Info    Yesika Suárez MD Schedule an appointment as soon as possible for a visit in 1 day  [address] [place] [address]  [phone]      [facility] EMERGENCY DEPT  As needed, If symptoms worsen [address] [place] [address] Dr Lomeli S. [facility]  [phone]        F/u urology this week. [place] catheter management. Need protime check this week    Time spent in patient discharge planning and coordination 45 minutes.     Signed:  Kimberli Cerrato MD

## 2017-05-06 NOTE — PROGRESS NOTES
Report given to Ferdinand Abbott RN at [facility]. Patient's spouse to transport patient to facility.

## 2017-05-07 ENCOUNTER — PATIENT OUTREACH (OUTPATIENT)
Dept: CASE MANAGEMENT | Age: 72
End: 2017-05-07

## 2017-05-07 LAB
BACTERIA SPEC CULT: NORMAL
BACTERIA SPEC CULT: NORMAL
SERVICE CMNT-IMP: NORMAL
SERVICE CMNT-IMP: NORMAL

## 2017-05-07 NOTE — PROGRESS NOTES
ELIAN outreach postponed for 21 days due to discharge to non-preferred network SNF. This note will not be viewable in [address]

## 2017-06-02 ENCOUNTER — PATIENT OUTREACH (OUTPATIENT)
Dept: CASE MANAGEMENT | Age: 72
End: 2017-06-02

## 2017-06-03 ENCOUNTER — PATIENT OUTREACH (OUTPATIENT)
Dept: CASE MANAGEMENT | Age: 72
End: 2017-06-03

## 2017-06-03 NOTE — PROGRESS NOTES
Second  SNF f/u outreach attempt to patient was unsuccessful. LVM. Will close case due to unsuccessful outreach attempts. This note will not be viewable in [address]

## 2017-07-24 ENCOUNTER — HOSPITAL ENCOUNTER (OUTPATIENT)
Dept: ULTRASOUND IMAGING | Age: 72
Discharge: HOME | End: 2017-07-24
Attending: INTERNAL MEDICINE
Payer: MEDICARE

## 2017-07-24 DIAGNOSIS — M79.89 LEG SWELLING: ICD-10-CM

## 2017-07-24 DIAGNOSIS — Z95.828 S/P IVC FILTER: ICD-10-CM

## 2017-07-24 DIAGNOSIS — I82.4Y9 DVT, LOWER EXTREMITY, PROXIMAL, ACUTE, UNSPECIFIED LATERALITY (HCC): ICD-10-CM

## 2017-07-24 PROCEDURE — 93970 EXTREMITY STUDY: CPT

## 2017-08-08 ENCOUNTER — HOSPITAL ENCOUNTER (OUTPATIENT)
Dept: MRI IMAGING | Age: 72
Discharge: HOME | End: 2017-08-08
Attending: INTERNAL MEDICINE
Payer: MEDICARE

## 2017-08-08 DIAGNOSIS — S89.91XD LOWER LEG SOFT TISSUE INJURY, RIGHT, SUBSEQUENT ENCOUNTER: ICD-10-CM

## 2017-08-08 PROCEDURE — 73718 MRI LOWER EXTREMITY W/O DYE: CPT

## 2017-11-22 PROBLEM — G89.29 CHRONIC MIDLINE LOW BACK PAIN WITHOUT SCIATICA: Status: ACTIVE | Noted: 2017-11-22

## 2017-11-22 PROBLEM — M54.50 CHRONIC MIDLINE LOW BACK PAIN WITHOUT SCIATICA: Status: ACTIVE | Noted: 2017-11-22

## 2017-12-06 ENCOUNTER — HOSPITAL ENCOUNTER (OUTPATIENT)
Dept: MRI IMAGING | Age: 72
Discharge: HOME | End: 2017-12-06
Attending: INTERNAL MEDICINE
Payer: MEDICARE

## 2017-12-06 DIAGNOSIS — M54.50 CHRONIC MIDLINE LOW BACK PAIN WITHOUT SCIATICA: ICD-10-CM

## 2017-12-06 DIAGNOSIS — G89.29 CHRONIC MIDLINE LOW BACK PAIN WITHOUT SCIATICA: ICD-10-CM

## 2017-12-06 PROCEDURE — 72148 MRI LUMBAR SPINE W/O DYE: CPT

## 2018-01-24 PROBLEM — E11.21 TYPE 2 DIABETES MELLITUS WITH NEPHROPATHY (HCC): Status: ACTIVE | Noted: 2018-01-24

## 2018-01-24 PROBLEM — M47.812 SPONDYLOSIS OF CERVICAL REGION WITHOUT MYELOPATHY OR RADICULOPATHY: Status: ACTIVE | Noted: 2018-01-24

## 2018-01-24 PROBLEM — F33.41 RECURRENT MAJOR DEPRESSIVE DISORDER, IN PARTIAL REMISSION (HCC): Status: ACTIVE | Noted: 2018-01-24

## 2018-01-25 ENCOUNTER — HOSPITAL ENCOUNTER (OUTPATIENT)
Dept: DIABETES SERVICES | Age: 73
Discharge: HOME | End: 2018-01-25
Payer: MEDICARE

## 2018-01-25 VITALS — BODY MASS INDEX: 42.11 KG/M2 | WEIGHT: 262 LBS | HEIGHT: 66 IN

## 2018-01-25 PROCEDURE — G0108 DIAB MANAGE TRN  PER INDIV: HCPCS

## 2018-01-25 NOTE — PROGRESS NOTES
Came for diabetes educational assessment today. Provided basic information on carbohydrates, proteins and fats. Educational need/plan: Will attend 2 nutrition/2 diabetes sessions to address the following: diabetes disease process, nutritional management, physical activity, using medications, preventing complications, psychosocial adjustment, goal setting, problem solving, monitoring, behavior change strategies.

## 2018-02-06 ENCOUNTER — HOSPITAL ENCOUNTER (OUTPATIENT)
Dept: DIABETES SERVICES | Age: 73
Discharge: HOME | End: 2018-02-06
Payer: MEDICARE

## 2018-02-06 PROCEDURE — G0109 DIAB MANAGE TRN IND/GROUP: HCPCS

## 2018-02-06 NOTE — PROGRESS NOTES
Participant attended Diabetes #1 session today. Topics included: Characteristics/pathophysiology type 1/type 2 diabetes; Goal/acceptable blood glucose ranges/Hgb A1C/interpreting/using results; Using medications safely; Sick day management; Prevention/detection/treatment of acute complications. - Verbalized understanding  of material covered.   -Goal for next session Nutrition One   -Anticipated adherence is   Good   -Problems/barriers may be none anticipated

## 2018-02-12 PROBLEM — I82.4Y9 DVT, LOWER EXTREMITY, PROXIMAL, ACUTE (HCC): Status: RESOLVED | Noted: 2017-03-21 | Resolved: 2018-02-12

## 2018-02-12 PROBLEM — I82.409 DVT (DEEP VENOUS THROMBOSIS) (HCC): Chronic | Status: RESOLVED | Noted: 2017-05-02 | Resolved: 2018-02-12

## 2018-02-12 PROBLEM — E66.01 OBESITY, MORBID (HCC): Status: ACTIVE | Noted: 2018-02-12

## 2018-02-22 ENCOUNTER — HOSPITAL ENCOUNTER (OUTPATIENT)
Dept: DIABETES SERVICES | Age: 73
Discharge: HOME | End: 2018-02-22
Payer: MEDICARE

## 2018-02-22 PROCEDURE — G0109 DIAB MANAGE TRN IND/GROUP: HCPCS

## 2018-02-22 NOTE — PROGRESS NOTES
Attended nutrition diabetes #1 group session today. Topics included: disease process and treatment; carbohydrate choices (emphasizing high fiber carbohydrates); proteins (emphasizing heart healthy choices) and fat food choices (emphasizing unsaturated fats); free foods; combination food choices; nutrition tips for persons with diabetes; snack ideas; resources for diabetes management. Two methods of meal planning were reviewed: carbohydrate choices and carbohydrate counting. Voiced /demonstrated understanding of material covered. Goal for next session is: add a protein to meals and snacks. Anticipated adherence is good. Problems/barriers may be: none identified at this time.

## 2018-03-08 ENCOUNTER — HOSPITAL ENCOUNTER (OUTPATIENT)
Dept: DIABETES SERVICES | Age: 73
Discharge: HOME | End: 2018-03-08
Payer: MEDICARE

## 2018-03-08 PROCEDURE — G0109 DIAB MANAGE TRN IND/GROUP: HCPCS

## 2018-03-08 NOTE — PROGRESS NOTES
Attended nutrition diabetes #2 group session today. Topics included: plate method for portion control; fiber and sodium guidelines; sugar substitutes; alcohol; eating out; recipe modification; label reading. Voiced/demonstrated understanding of material covered. Anticipated adherence is average. Problems/barriers: none identified at this time. Recommend check pre-meal blood glucose and 2 hour post-prandial blood glucose to see how food choices affect blood glucose. Pt's nutrition goal is to preplan meals and make better choices when eating out. Plan for follow up is attend diabetes medical # 2 class on 3/12/18.

## 2018-03-12 ENCOUNTER — TELEPHONE (OUTPATIENT)
Dept: DIABETES SERVICES | Age: 73
End: 2018-03-12

## 2018-03-12 NOTE — TELEPHONE ENCOUNTER
Patient no show for Diabetes Two class today. He does want to complete the program.  Scheduled him for 3-20-18 at 8 AM.  Per patient car issues today. He will keep his 3-22-18 follow up class.

## 2018-03-20 ENCOUNTER — HOSPITAL ENCOUNTER (OUTPATIENT)
Dept: DIABETES SERVICES | Age: 73
Discharge: HOME | End: 2018-03-20
Payer: MEDICARE

## 2018-03-20 PROCEDURE — G0109 DIAB MANAGE TRN IND/GROUP: HCPCS

## 2018-03-20 NOTE — PROGRESS NOTES
Participant attended Diabetes #2 session today. Topics included: Prevention/detection/treatment of chronic complications; sleep apnea; Developing strategies to promote health/change behavior/recommended screenings; Developing strategies to address psychosocial issues; Goal setting. Participants goal/support plan includes  Nutritional Goal:  To improve diet/health. I will preplan meals and make better choices when eating out. Start in one week and reevaluate in one month. Medical Goal: To improve health, I will take all medications as prescribed. Beginning 4-1-18 and reevaluate monthly . Problems/barriers may be: anticipated; comments: Patient with family dynamics, he will need support to succeed with program, but says he will try without support. He reports depression and needs medication adjustment. No suicidal thoughts. Report he will talk to health care provider about a referral to specialist for help with his depression. Plan for follow up/Recommendations: mail follow up survey in 3 months.

## 2018-03-22 ENCOUNTER — HOSPITAL ENCOUNTER (OUTPATIENT)
Dept: DIABETES SERVICES | Age: 73
Discharge: HOME | End: 2018-03-22
Payer: MEDICARE

## 2018-03-22 PROCEDURE — G0109 DIAB MANAGE TRN IND/GROUP: HCPCS

## 2018-06-04 PROBLEM — M47.816 ARTHRITIS, LUMBAR SPINE: Status: ACTIVE | Noted: 2018-06-04

## 2018-08-01 ENCOUNTER — HOSPITAL ENCOUNTER (OUTPATIENT)
Dept: MRI IMAGING | Age: 73
Discharge: HOME | End: 2018-08-01
Attending: INTERNAL MEDICINE
Payer: MEDICARE

## 2018-08-01 DIAGNOSIS — M47.812 SPONDYLOSIS OF CERVICAL REGION WITHOUT MYELOPATHY OR RADICULOPATHY: ICD-10-CM

## 2018-08-01 DIAGNOSIS — M54.2 NECK PAIN: ICD-10-CM

## 2018-08-01 PROCEDURE — 72141 MRI NECK SPINE W/O DYE: CPT
